# Patient Record
Sex: MALE | Race: WHITE | NOT HISPANIC OR LATINO | URBAN - METROPOLITAN AREA
[De-identification: names, ages, dates, MRNs, and addresses within clinical notes are randomized per-mention and may not be internally consistent; named-entity substitution may affect disease eponyms.]

---

## 2018-12-27 ENCOUNTER — TRANSCRIBE ORDERS (OUTPATIENT)
Dept: LAB | Age: 33
End: 2018-12-27

## 2018-12-27 ENCOUNTER — APPOINTMENT (OUTPATIENT)
Dept: LAB | Age: 33
End: 2018-12-27
Payer: COMMERCIAL

## 2018-12-27 DIAGNOSIS — N40.0 BENIGN PROSTATIC HYPERPLASIA, UNSPECIFIED WHETHER LOWER URINARY TRACT SYMPTOMS PRESENT: Primary | ICD-10-CM

## 2018-12-27 DIAGNOSIS — N40.0 BENIGN PROSTATIC HYPERPLASIA, UNSPECIFIED WHETHER LOWER URINARY TRACT SYMPTOMS PRESENT: ICD-10-CM

## 2018-12-27 LAB
BASOPHILS # BLD AUTO: 0.05 THOUSANDS/ΜL (ref 0–0.1)
BASOPHILS NFR BLD AUTO: 1 % (ref 0–1)
BUN SERPL-MCNC: 27 MG/DL (ref 5–25)
CREAT SERPL-MCNC: 1.52 MG/DL (ref 0.6–1.3)
EOSINOPHIL # BLD AUTO: 0.27 THOUSAND/ΜL (ref 0–0.61)
EOSINOPHIL NFR BLD AUTO: 4 % (ref 0–6)
ERYTHROCYTE [DISTWIDTH] IN BLOOD BY AUTOMATED COUNT: 11.9 % (ref 11.6–15.1)
GFR SERPL CREATININE-BSD FRML MDRD: 60 ML/MIN/1.73SQ M
HCT VFR BLD AUTO: 42.6 % (ref 36.5–49.3)
HGB BLD-MCNC: 14.5 G/DL (ref 12–17)
IMM GRANULOCYTES # BLD AUTO: 0.01 THOUSAND/UL (ref 0–0.2)
IMM GRANULOCYTES NFR BLD AUTO: 0 % (ref 0–2)
LYMPHOCYTES # BLD AUTO: 2.37 THOUSANDS/ΜL (ref 0.6–4.47)
LYMPHOCYTES NFR BLD AUTO: 32 % (ref 14–44)
MCH RBC QN AUTO: 31.9 PG (ref 26.8–34.3)
MCHC RBC AUTO-ENTMCNC: 34 G/DL (ref 31.4–37.4)
MCV RBC AUTO: 94 FL (ref 82–98)
MONOCYTES # BLD AUTO: 0.44 THOUSAND/ΜL (ref 0.17–1.22)
MONOCYTES NFR BLD AUTO: 6 % (ref 4–12)
NEUTROPHILS # BLD AUTO: 4.34 THOUSANDS/ΜL (ref 1.85–7.62)
NEUTS SEG NFR BLD AUTO: 57 % (ref 43–75)
NRBC BLD AUTO-RTO: 0 /100 WBCS
PLATELET # BLD AUTO: 374 THOUSANDS/UL (ref 149–390)
PMV BLD AUTO: 11.8 FL (ref 8.9–12.7)
RBC # BLD AUTO: 4.55 MILLION/UL (ref 3.88–5.62)
WBC # BLD AUTO: 7.48 THOUSAND/UL (ref 4.31–10.16)

## 2018-12-27 PROCEDURE — 84520 ASSAY OF UREA NITROGEN: CPT

## 2018-12-27 PROCEDURE — 82565 ASSAY OF CREATININE: CPT

## 2018-12-27 PROCEDURE — 85025 COMPLETE CBC W/AUTO DIFF WBC: CPT

## 2018-12-27 PROCEDURE — 36415 COLL VENOUS BLD VENIPUNCTURE: CPT

## 2018-12-31 ENCOUNTER — APPOINTMENT (OUTPATIENT)
Dept: LAB | Age: 33
End: 2018-12-31
Payer: COMMERCIAL

## 2018-12-31 DIAGNOSIS — N40.0 BENIGN PROSTATIC HYPERPLASIA, UNSPECIFIED WHETHER LOWER URINARY TRACT SYMPTOMS PRESENT: ICD-10-CM

## 2018-12-31 LAB
CREAT 24H UR-MRATE: 2 G/24HR (ref 0.8–1.8)
CREAT CL 24H UR+SERPL-VRATE: 90.85 ML/MIN (ref 80–125)
CREAT SERPL-MCNC: 1.5 MG/DL (ref 0.6–1.3)
CREAT UR-MCNC: 79.4 MG/DL
PROT 24H UR-MCNC: 4075 MG/24 HRS (ref 40–150)
SPECIMEN VOL UR: 2500 ML
SPECIMEN VOL UR: 2500 ML

## 2018-12-31 PROCEDURE — 36415 COLL VENOUS BLD VENIPUNCTURE: CPT

## 2018-12-31 PROCEDURE — 82575 CREATININE CLEARANCE TEST: CPT

## 2018-12-31 PROCEDURE — 84156 ASSAY OF PROTEIN URINE: CPT

## 2018-12-31 PROCEDURE — 82565 ASSAY OF CREATININE: CPT

## 2019-01-04 ENCOUNTER — TRANSCRIBE ORDERS (OUTPATIENT)
Dept: ADMINISTRATIVE | Facility: HOSPITAL | Age: 34
End: 2019-01-04

## 2019-01-04 DIAGNOSIS — R31.9 HEMATURIA SYNDROME: ICD-10-CM

## 2019-01-04 DIAGNOSIS — N40.1 BENIGN PROSTATIC HYPERPLASIA WITH LOWER URINARY TRACT SYMPTOMS, SYMPTOM DETAILS UNSPECIFIED: Primary | ICD-10-CM

## 2019-01-07 ENCOUNTER — HOSPITAL ENCOUNTER (OUTPATIENT)
Dept: RADIOLOGY | Age: 34
Discharge: HOME | End: 2019-01-07
Payer: COMMERCIAL

## 2019-01-07 DIAGNOSIS — N40.0 ENLARGED PROSTATE: ICD-10-CM

## 2019-01-07 DIAGNOSIS — R31.9 HEMATURIA SYNDROME: ICD-10-CM

## 2019-01-07 DIAGNOSIS — N40.1 BENIGN PROSTATIC HYPERPLASIA WITH LOWER URINARY TRACT SYMPTOMS, SYMPTOM DETAILS UNSPECIFIED: ICD-10-CM

## 2019-01-07 PROCEDURE — 51798 US URINE CAPACITY MEASURE: CPT

## 2019-02-06 ENCOUNTER — TELEPHONE (OUTPATIENT)
Dept: NEPHROLOGY | Facility: CLINIC | Age: 34
End: 2019-02-06

## 2019-02-06 NOTE — TELEPHONE ENCOUNTER
Called and left a voicemail in regards to rescheduling patient  He is scheduled for a consultation apt with Dr Adam Martin  Patient needs a new provider and day/ time   I have 2 slots saved from 1-3 pm on 2/25 with Dr Tawanda Huber

## 2019-02-13 ENCOUNTER — TELEPHONE (OUTPATIENT)
Dept: NEPHROLOGY | Facility: CLINIC | Age: 34
End: 2019-02-13

## 2019-02-14 ENCOUNTER — HOSPITAL ENCOUNTER (INPATIENT)
Facility: HOSPITAL | Age: 34
LOS: 2 days | Discharge: HOME | DRG: 700 | End: 2019-02-16
Attending: INTERNAL MEDICINE | Admitting: INTERNAL MEDICINE
Payer: COMMERCIAL

## 2019-02-14 ENCOUNTER — CONSULT (OUTPATIENT)
Dept: NEPHROLOGY | Facility: CLINIC | Age: 34
End: 2019-02-14
Payer: COMMERCIAL

## 2019-02-14 VITALS
WEIGHT: 158.4 LBS | SYSTOLIC BLOOD PRESSURE: 142 MMHG | DIASTOLIC BLOOD PRESSURE: 69 MMHG | HEIGHT: 71 IN | BODY MASS INDEX: 22.18 KG/M2 | HEART RATE: 86 BPM

## 2019-02-14 DIAGNOSIS — N01.9 RAPIDLY PROGRESSIVE GLOMERULONEPHRITIS: ICD-10-CM

## 2019-02-14 DIAGNOSIS — R31.9 HEMATURIA: Primary | ICD-10-CM

## 2019-02-14 DIAGNOSIS — N05.9 NEPHRITIC SYNDROME: ICD-10-CM

## 2019-02-14 DIAGNOSIS — R03.0 ELEVATED BLOOD PRESSURE READING: ICD-10-CM

## 2019-02-14 DIAGNOSIS — R31.9 HEMATURIA, UNSPECIFIED TYPE: ICD-10-CM

## 2019-02-14 DIAGNOSIS — R80.9 PROTEINURIA: ICD-10-CM

## 2019-02-14 DIAGNOSIS — R80.9 PROTEINURIA, UNSPECIFIED TYPE: Primary | ICD-10-CM

## 2019-02-14 LAB
ANION GAP SERPL CALCULATED.3IONS-SCNC: 6 MMOL/L (ref 4–13)
BACTERIA UR QL AUTO: ABNORMAL /HPF
BASOPHILS # BLD AUTO: 0.07 THOUSANDS/ΜL (ref 0–0.1)
BASOPHILS NFR BLD AUTO: 1 % (ref 0–1)
BILIRUB UR QL STRIP: NEGATIVE
BUN SERPL-MCNC: 29 MG/DL (ref 5–25)
C3 SERPL-MCNC: 96.2 MG/DL (ref 90–180)
C4 SERPL-MCNC: 21 MG/DL (ref 10–40)
CALCIUM SERPL-MCNC: 8.4 MG/DL (ref 8.3–10.1)
CHLORIDE SERPL-SCNC: 109 MMOL/L (ref 100–108)
CLARITY UR: CLEAR
CO2 SERPL-SCNC: 28 MMOL/L (ref 21–32)
COLOR UR: YELLOW
CREAT SERPL-MCNC: 1.43 MG/DL (ref 0.6–1.3)
EOSINOPHIL # BLD AUTO: 0.24 THOUSAND/ΜL (ref 0–0.61)
EOSINOPHIL NFR BLD AUTO: 3 % (ref 0–6)
ERYTHROCYTE [DISTWIDTH] IN BLOOD BY AUTOMATED COUNT: 11.9 % (ref 11.6–15.1)
GFR SERPL CREATININE-BSD FRML MDRD: 65 ML/MIN/1.73SQ M
GLUCOSE SERPL-MCNC: 77 MG/DL (ref 65–140)
GLUCOSE UR STRIP-MCNC: NEGATIVE MG/DL
HCT VFR BLD AUTO: 36.3 % (ref 36.5–49.3)
HGB BLD-MCNC: 12.5 G/DL (ref 12–17)
HGB UR QL STRIP.AUTO: ABNORMAL
HIV 1+2 AB+HIV1 P24 AG SERPL QL IA: NORMAL
HIV1 P24 AG SER QL: NORMAL
HYALINE CASTS #/AREA URNS LPF: ABNORMAL /LPF
IMM GRANULOCYTES # BLD AUTO: 0.02 THOUSAND/UL (ref 0–0.2)
IMM GRANULOCYTES NFR BLD AUTO: 0 % (ref 0–2)
KETONES UR STRIP-MCNC: NEGATIVE MG/DL
LEUKOCYTE ESTERASE UR QL STRIP: NEGATIVE
LYMPHOCYTES # BLD AUTO: 3.26 THOUSANDS/ΜL (ref 0.6–4.47)
LYMPHOCYTES NFR BLD AUTO: 34 % (ref 14–44)
MCH RBC QN AUTO: 32.1 PG (ref 26.8–34.3)
MCHC RBC AUTO-ENTMCNC: 34.4 G/DL (ref 31.4–37.4)
MCV RBC AUTO: 93 FL (ref 82–98)
MONOCYTES # BLD AUTO: 0.45 THOUSAND/ΜL (ref 0.17–1.22)
MONOCYTES NFR BLD AUTO: 5 % (ref 4–12)
NEUTROPHILS # BLD AUTO: 5.46 THOUSANDS/ΜL (ref 1.85–7.62)
NEUTS SEG NFR BLD AUTO: 57 % (ref 43–75)
NITRITE UR QL STRIP: NEGATIVE
NON-SQ EPI CELLS URNS QL MICRO: ABNORMAL /HPF
NRBC BLD AUTO-RTO: 0 /100 WBCS
PH UR STRIP.AUTO: 5.5 [PH] (ref 4.5–8)
PLATELET # BLD AUTO: 343 THOUSANDS/UL (ref 149–390)
PMV BLD AUTO: 10.8 FL (ref 8.9–12.7)
POTASSIUM SERPL-SCNC: 3.6 MMOL/L (ref 3.5–5.3)
PROT UR STRIP-MCNC: ABNORMAL MG/DL
RBC # BLD AUTO: 3.9 MILLION/UL (ref 3.88–5.62)
RBC #/AREA URNS AUTO: ABNORMAL /HPF
SL AMB  POCT GLUCOSE, UA: NEGATIVE
SL AMB LEUKOCYTE ESTERASE,UA: NEGATIVE
SL AMB POCT BILIRUBIN,UA: NEGATIVE
SL AMB POCT BLOOD,UA: 3
SL AMB POCT CLARITY,UA: CLEAR
SL AMB POCT COLOR,UA: YELLOW
SL AMB POCT KETONES,UA: NEGATIVE
SL AMB POCT NITRITE,UA: NEGATIVE
SL AMB POCT PH,UA: 6
SL AMB POCT SPECIFIC GRAVITY,UA: 1.02
SL AMB POCT URINE PROTEIN: 4
SL AMB POCT UROBILINOGEN: 0.2
SODIUM SERPL-SCNC: 143 MMOL/L (ref 136–145)
SP GR UR STRIP.AUTO: 1.01 (ref 1–1.03)
UROBILINOGEN UR QL STRIP.AUTO: 0.2 E.U./DL
WBC # BLD AUTO: 9.5 THOUSAND/UL (ref 4.31–10.16)
WBC #/AREA URNS AUTO: ABNORMAL /HPF

## 2019-02-14 PROCEDURE — 85025 COMPLETE CBC W/AUTO DIFF WBC: CPT | Performed by: INTERNAL MEDICINE

## 2019-02-14 PROCEDURE — 99245 OFF/OP CONSLTJ NEW/EST HI 55: CPT | Performed by: INTERNAL MEDICINE

## 2019-02-14 PROCEDURE — 93005 ELECTROCARDIOGRAM TRACING: CPT

## 2019-02-14 PROCEDURE — 80074 ACUTE HEPATITIS PANEL: CPT | Performed by: INTERNAL MEDICINE

## 2019-02-14 PROCEDURE — 86038 ANTINUCLEAR ANTIBODIES: CPT | Performed by: INTERNAL MEDICINE

## 2019-02-14 PROCEDURE — 87806 HIV AG W/HIV1&2 ANTB W/OPTIC: CPT | Performed by: INTERNAL MEDICINE

## 2019-02-14 PROCEDURE — 84166 PROTEIN E-PHORESIS/URINE/CSF: CPT | Performed by: PATHOLOGY

## 2019-02-14 PROCEDURE — 81002 URINALYSIS NONAUTO W/O SCOPE: CPT | Performed by: INTERNAL MEDICINE

## 2019-02-14 PROCEDURE — 86160 COMPLEMENT ANTIGEN: CPT | Performed by: INTERNAL MEDICINE

## 2019-02-14 PROCEDURE — 82570 ASSAY OF URINE CREATININE: CPT | Performed by: INTERNAL MEDICINE

## 2019-02-14 PROCEDURE — 84156 ASSAY OF PROTEIN URINE: CPT | Performed by: INTERNAL MEDICINE

## 2019-02-14 PROCEDURE — 84165 PROTEIN E-PHORESIS SERUM: CPT | Performed by: INTERNAL MEDICINE

## 2019-02-14 PROCEDURE — 86225 DNA ANTIBODY NATIVE: CPT | Performed by: INTERNAL MEDICINE

## 2019-02-14 PROCEDURE — 99223 1ST HOSP IP/OBS HIGH 75: CPT | Performed by: INTERNAL MEDICINE

## 2019-02-14 PROCEDURE — 84165 PROTEIN E-PHORESIS SERUM: CPT | Performed by: PATHOLOGY

## 2019-02-14 PROCEDURE — 80048 BASIC METABOLIC PNL TOTAL CA: CPT | Performed by: INTERNAL MEDICINE

## 2019-02-14 PROCEDURE — 82595 ASSAY OF CRYOGLOBULIN: CPT | Performed by: INTERNAL MEDICINE

## 2019-02-14 PROCEDURE — 84166 PROTEIN E-PHORESIS/URINE/CSF: CPT | Performed by: INTERNAL MEDICINE

## 2019-02-14 PROCEDURE — 81001 URINALYSIS AUTO W/SCOPE: CPT | Performed by: INTERNAL MEDICINE

## 2019-02-14 RX ORDER — SENNOSIDES 8.6 MG
1 TABLET ORAL DAILY
Status: DISCONTINUED | OUTPATIENT
Start: 2019-02-15 | End: 2019-02-16 | Stop reason: HOSPADM

## 2019-02-14 RX ORDER — ONDANSETRON 2 MG/ML
4 INJECTION INTRAMUSCULAR; INTRAVENOUS EVERY 6 HOURS PRN
Status: DISCONTINUED | OUTPATIENT
Start: 2019-02-14 | End: 2019-02-16 | Stop reason: HOSPADM

## 2019-02-14 RX ORDER — DOCUSATE SODIUM 100 MG/1
100 CAPSULE, LIQUID FILLED ORAL 2 TIMES DAILY
Status: DISCONTINUED | OUTPATIENT
Start: 2019-02-14 | End: 2019-02-16 | Stop reason: HOSPADM

## 2019-02-14 RX ORDER — ACETAMINOPHEN 325 MG/1
650 TABLET ORAL EVERY 6 HOURS PRN
Status: DISCONTINUED | OUTPATIENT
Start: 2019-02-14 | End: 2019-02-16 | Stop reason: HOSPADM

## 2019-02-14 RX ORDER — MAGNESIUM HYDROXIDE/ALUMINUM HYDROXICE/SIMETHICONE 120; 1200; 1200 MG/30ML; MG/30ML; MG/30ML
30 SUSPENSION ORAL EVERY 6 HOURS PRN
Status: DISCONTINUED | OUTPATIENT
Start: 2019-02-14 | End: 2019-02-16 | Stop reason: HOSPADM

## 2019-02-14 RX ADMIN — DOCUSATE SODIUM 100 MG: 100 CAPSULE, LIQUID FILLED ORAL at 20:50

## 2019-02-14 NOTE — PROGRESS NOTES
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  -patient's overall clinical picture with a history of microscopic hematuria, a serum creatinine from December 31st of 1 5 with an elevated BUN, a 24 hr urine protein of 4 g with a urine volume of 2 5 L elevated blood pressure reading today in the office as well as RBC casts on urine sediment are concerning for a rapidly progressive glomerulonephritis  -given that his blood work is from 6 weeks ago I would recommend inpatient hospitalization-have called for a direct admission  -would check complements, Anca, anti-GBM, double-stranded DNA, LES, SPEP, UPEP, anti PLA2R if available urine protein to creatinine ratio, urinalysis, hepatitis, HIV, ASLO, cryoglobulin  -consider kidney biopsy either tomorrow or Monday depending on availability from [facility]  -depending on lab work would start 500 to a 1000 mg of methylprednisolone daily x3 days  -kidney size from renal ultrasound on January 7th shows a right kidney of 10 2 x 3 9 cm and a left kidney of 11 5 x 5 0 cm with normal echogenicity and contour  -differential diagnosis includes an immune complex crescentic GN-IgA crescentic GN IgA vasculitis, lupus nephritis, membranous nephropathy or a mixed cryoglobulinemia, Pauci immune RPGN, anti-GBM, versus other    4   Elevated blood pressure reading  -blood pressure elevated in the office today  -consider starting RAAS blockade after blood work is reviewed if remains elevated    Highly suspicious for a rapidly progressive glomerulonephritis, his sister does have a history of an RP GN he also has an uncle with a history of kidney disease although he is not sure what type  Nevertheless if this is an RP GN progression to end-stage kidney disease could happen within weeks to months  I did stress the importance of rapid diagnosis treatment and treatment to preserve residual renal function and prevent further morbidity  He has agreed to the plan of going to the hospital   Awaiting for bed availability at [facility] and will make further recommendations during his hospitalization  Discussed with my colleague and waiting a call back from [address] [place] [address] for admission      HPI:  Xena Roman is a 34 y o male who was referred by Marlin Quan MD for evaluation of hematuria and proteinuria    A 46-year-old male he has family history of kidney disease his grandfather require dialysis during his senior years, his uncle is on the transplant list, his sister has reflux nephropathy, is no other significant past medical history he is retired from [employer] in his 25s 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 did not have any lead exposure Mercury exposure or cadmium exposure that he knows of  He now as a , he has had hematuria as a child and was following up with Urology at that point for cystoscopy he had noticed some dark urine increased nausea in the mornings as well as an ammonia taste in his breath and decided to have a repeat analysis by his urologist at which point he had a cystoscopy done that was negative, a renal ultrasound that shows normal size and echogenicity but a 24 hr urine protein that was elevated to 4 g and a hematuria  He is here with his fiancee as well, denies any illicit drug use does use some marijuana denies any excessive NSAID use does have some foamy urine no gross hematuria but notices his urine is dark at times  Otherwise is in his usual state of health  Does denies swelling      I personally spent over half of a total 60 minutes face to face with the patient in counseling and discussion and/or coordination of care as described above  ROS: All the systems were reviewed and were negative except as documented on the HPI  Allergies: Patient has no known allergies  Medications: No current outpatient medications on file  Past Medical History:   Diagnosis Date    Hematuria     Proteinuria      Past Surgical History:   Procedure Laterality Date    CLAVICLE SURGERY       Family History   Problem Relation Age of Onset    Breast cancer Mother     No Known Problems Father     Kidney disease Maternal Uncle     Kidney disease Maternal Grandfather       reports that he has never smoked  He has never used smokeless tobacco  He reports that he drinks alcohol  He reports that he does not use drugs  Physical Exam:   Vitals:    02/14/19 1039   BP: 142/69   BP Location: Right arm   Patient Position: Sitting   Cuff Size: Large   Pulse: 86   Weight: 71 8 kg (158 lb 6 4 oz)   Height: 5' 11" (1 803 m)     Body mass index is 22 09 kg/m²  General: conscious, cooperative, in not acute distress  Eyes: conjunctivae pink, anicteric sclerae  ENT: lips and mucous membranes moist  Neck: supple, no JVD  Chest: clear breath sounds bilateral, no crackles, ronchus or wheezings  CVS: distinct S1 & S2, normal rate, regular rhythm  Abdomen: soft, non-tender, non-distended, normoactive bowel sounds  Extremities: no edema of both legs  Skin: no rash  Neuro: awake, alert, oriented      Results for orders placed or performed in visit on 02/14/19   POCT urine dip   Result Value Ref Range    LEUKOCYTE ESTERASE,UA NEGATIVE     NITRITE,UA NEGATIVE     SL AMB POCT UROBILINOGEN 0 2     POCT URINE PROTEIN 4      PH,UA 6 0     BLOOD,UA 3     SPECIFIC GRAVITY,UA 1 020     KETONES,UA NEGATIVE     BILIRUBIN,UA NEGATIVE     GLUCOSE, UA NEGATIVE      COLOR,UA YELLOW     CLARITY,UA CLEAR          Portions of the record may have been created with voice recognition software  Occasional wrong word or "sound a like" substitutions may have occurred due to the inherent limitations of voice recognition software  Read the chart carefully and recognize, using context, where substitutions have occurred  If you have any questions, please contact the dictating provider

## 2019-02-14 NOTE — PATIENT INSTRUCTIONS
Mark Pichardo, at this point we recommend inpatient hospitalization for further evaluation of the protein in your urine an elevated kidney numbers    Further recommendations and treatment will be provided at that point

## 2019-02-14 NOTE — LETTER
February 14, 2019     Ector Chauhan MD  [address] [place]     Patient: Jordana Code   YOB: 1985   Date of Visit: 2/14/2019       Dear Dr INGRAM [facility]: Thank you for referring Asmita Adam to me for evaluation  Below are my notes for this consultation  If you have questions, please do not hesitate to call me  I look forward to following your patient along with you  Sincerely,        Heide Diez DO        CC: No Recipients  Heide Diez DO  2/14/2019 12:15 PM  Incomplete  OFFICE CONSULT - Nephrology   Jordana Code 34 y o  male MRN: [id number]    Encounter: [id number]        ASSESSMENT & PLAN    34Year old male with a a remote history of microscopic hematuria presents for evaluation of worsening hematuria, acute kidney injury, proteinuria    Leonidas Smith was seen today for consult  Diagnoses and all orders for this visit:    1   Proteinuria, unspecified type and Hematuria, unspecified type with 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  -patient's overall clinical picture with a history of microscopic hematuria, a serum creatinine from December 31st of 1 5 with an elevated BUN, a 24 hr urine protein of 4 g with a urine volume of 2 5 L elevated blood pressure reading today in the office as well as RBC casts on urine sediment are concerning for a rapidly progressive glomerulonephritis  -given that his blood work is from 6 weeks ago I would recommend inpatient hospitalization-have called for a direct admission  -would check complements, Anca, anti-GBM, double-stranded DNA, LES, SPEP, UPEP, anti PLA2R if available urine protein to creatinine ratio, urinalysis, hepatitis, HIV, ASLO, cryoglobulin  -consider kidney biopsy either tomorrow or Monday depending on availability from [facility]  -depending on lab work would start 500 to a 1000 mg of methylprednisolone daily x3 days  -kidney size from renal ultrasound on January 7th shows a right kidney of 10 2 x 3 9 cm and a left kidney of 11 5 x 5 0 cm with normal echogenicity and contour  -differential diagnosis includes an immune complex crescentic GN-IgA crescentic GN IgA vasculitis, lupus nephritis, membranous nephropathy or a mixed cryoglobulinemia, Pauci immune RPGN, anti-GBM, versus other    4  Elevated blood pressure reading  -blood pressure elevated in the office today  -consider starting RAAS blockade after blood work is reviewed if remains elevated    Highly suspicious for a rapidly progressive glomerulonephritis, his sister does have a history of an RP GN he also has an uncle with a history of kidney disease although he is not sure what type  Nevertheless if this is an RP GN progression to end-stage kidney disease could happen within weeks to months  I did stress the importance of rapid diagnosis treatment and treatment to preserve residual renal function and prevent further morbidity  He has agreed to the plan of going to the hospital   Awaiting for bed availability at [facility] and will make further recommendations during his hospitalization  Discussed with my colleague and waiting a call back from [address] [place] [address] for admission      HPI:  Laretta Eisenmenger is a 34 y o male who was referred by Laura Aranda MD for evaluation of hematuria and proteinuria    A 59-year-old male he has family history of kidney disease his grandfather require dialysis during his senior years, his uncle is on the transplant list, his sister has reflux nephropathy, is no other significant past medical history he is retired from [employer] in his 25s 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 did not have any lead exposure Mercury exposure or cadmium exposure that he knows of    He now as a , he has had hematuria as a child and was following up with Urology at that point for cystoscopy he had noticed some dark urine increased nausea in the mornings as well as an ammonia taste in his breath and decided to have a repeat analysis by his urologist at which point he had a cystoscopy done that was negative, a renal ultrasound that shows normal size and echogenicity but a 24 hr urine protein that was elevated to 4 g and a hematuria  He is here with his fiancee as well, denies any illicit drug use does use some marijuana denies any excessive NSAID use does have some foamy urine no gross hematuria but notices his urine is dark at times  Otherwise is in his usual state of health  Does denies swelling  I personally spent over half of a total 60 minutes face to face with the patient in counseling and discussion and/or coordination of care as described above  ROS: All the systems were reviewed and were negative except as documented on the HPI  Allergies: Patient has no known allergies  Medications: No current outpatient medications on file  Past Medical History:   Diagnosis Date    Hematuria     Proteinuria      Past Surgical History:   Procedure Laterality Date    CLAVICLE SURGERY       Family History   Problem Relation Age of Onset    Breast cancer Mother     No Known Problems Father     Kidney disease Maternal Uncle     Kidney disease Maternal Grandfather       reports that he has never smoked  He has never used smokeless tobacco  He reports that he drinks alcohol  He reports that he does not use drugs  Physical Exam:   Vitals:    02/14/19 1039   BP: 142/69   BP Location: Right arm   Patient Position: Sitting   Cuff Size: Large   Pulse: 86   Weight: 71 8 kg (158 lb 6 4 oz)   Height: 5' 11" (1 803 m)     Body mass index is 22 09 kg/m²      General: conscious, cooperative, in not acute distress  Eyes: conjunctivae pink, anicteric sclerae  ENT: lips and mucous membranes moist  Neck: supple, no JVD  Chest: clear breath sounds bilateral, no crackles, ronchus or wheezings  CVS: distinct S1 & S2, normal rate, regular rhythm  Abdomen: soft, non-tender, non-distended, normoactive bowel sounds  Extremities: no edema of both legs  Skin: no rash  Neuro: awake, alert, oriented      Results for orders placed or performed in visit on 02/14/19   POCT urine dip   Result Value Ref Range    LEUKOCYTE ESTERASE,UA NEGATIVE     NITRITE,UA NEGATIVE     SL AMB POCT UROBILINOGEN 0 2     POCT URINE PROTEIN 4      PH,UA 6 0     BLOOD,UA 3     SPECIFIC GRAVITY,UA 1 020     KETONES,UA NEGATIVE     BILIRUBIN,UA NEGATIVE     GLUCOSE, UA NEGATIVE      COLOR,UA YELLOW     CLARITY,UA CLEAR          Portions of the record may have been created with voice recognition software  Occasional wrong word or "sound a like" substitutions may have occurred due to the inherent limitations of voice recognition software  Read the chart carefully and recognize, using context, where substitutions have occurred  If you have any questions, please contact the dictating provider

## 2019-02-15 ENCOUNTER — APPOINTMENT (INPATIENT)
Dept: RADIOLOGY | Facility: HOSPITAL | Age: 34
DRG: 700 | End: 2019-02-15
Attending: INTERNAL MEDICINE
Payer: COMMERCIAL

## 2019-02-15 LAB
ALBUMIN SERPL BCP-MCNC: 2.9 G/DL (ref 3.5–5)
ALBUMIN SERPL ELPH-MCNC: 3.12 G/DL (ref 3.5–5)
ALBUMIN SERPL ELPH-MCNC: 57.8 % (ref 52–65)
ALBUMIN UR ELPH-MCNC: 88.9 %
ALP SERPL-CCNC: 55 U/L (ref 46–116)
ALPHA1 GLOB MFR UR ELPH: 2.2 %
ALPHA1 GLOB SERPL ELPH-MCNC: 0.26 G/DL (ref 0.1–0.4)
ALPHA1 GLOB SERPL ELPH-MCNC: 4.8 % (ref 2.5–5)
ALPHA2 GLOB MFR UR ELPH: 1.9 %
ALPHA2 GLOB SERPL ELPH-MCNC: 0.78 G/DL (ref 0.4–1.2)
ALPHA2 GLOB SERPL ELPH-MCNC: 14.4 % (ref 7–13)
ALT SERPL W P-5'-P-CCNC: 24 U/L (ref 12–78)
ANION GAP SERPL CALCULATED.3IONS-SCNC: 8 MMOL/L (ref 4–13)
ASO AB TITR SER LA: NORMAL {TITER}
AST SERPL W P-5'-P-CCNC: 19 U/L (ref 5–45)
ATRIAL RATE: 63 BPM
B-GLOBULIN MFR UR ELPH: 3.8 %
BASOPHILS # BLD AUTO: 0.08 THOUSANDS/ΜL (ref 0–0.1)
BASOPHILS NFR BLD AUTO: 1 % (ref 0–1)
BETA GLOB ABNORMAL SERPL ELPH-MCNC: 0.36 G/DL (ref 0.4–0.8)
BETA1 GLOB SERPL ELPH-MCNC: 6.7 % (ref 5–13)
BETA2 GLOB SERPL ELPH-MCNC: 5.8 % (ref 2–8)
BETA2+GAMMA GLOB SERPL ELPH-MCNC: 0.31 G/DL (ref 0.2–0.5)
BILIRUB SERPL-MCNC: 1.42 MG/DL (ref 0.2–1)
BUN SERPL-MCNC: 27 MG/DL (ref 5–25)
CALCIUM SERPL-MCNC: 8.8 MG/DL (ref 8.3–10.1)
CHLORIDE SERPL-SCNC: 109 MMOL/L (ref 100–108)
CO2 SERPL-SCNC: 25 MMOL/L (ref 21–32)
CREAT SERPL-MCNC: 1.44 MG/DL (ref 0.6–1.3)
CREAT UR-MCNC: 64.9 MG/DL
EOSINOPHIL # BLD AUTO: 0.42 THOUSAND/ΜL (ref 0–0.61)
EOSINOPHIL NFR BLD AUTO: 5 % (ref 0–6)
ERYTHROCYTE [DISTWIDTH] IN BLOOD BY AUTOMATED COUNT: 11.9 % (ref 11.6–15.1)
GAMMA GLOB ABNORMAL SERPL ELPH-MCNC: 0.57 G/DL (ref 0.5–1.6)
GAMMA GLOB MFR UR ELPH: 3.2 %
GAMMA GLOB SERPL ELPH-MCNC: 10.5 % (ref 12–22)
GFR SERPL CREATININE-BSD FRML MDRD: 64 ML/MIN/1.73SQ M
GLUCOSE SERPL-MCNC: 84 MG/DL (ref 65–140)
HAV IGM SER QL: NORMAL
HBV CORE IGM SER QL: NORMAL
HBV SURFACE AG SER QL: NORMAL
HCT VFR BLD AUTO: 41 % (ref 36.5–49.3)
HCV AB SER QL: NORMAL
HGB BLD-MCNC: 14.2 G/DL (ref 12–17)
IGG/ALB SER: 1.37 {RATIO} (ref 1.1–1.8)
IMM GRANULOCYTES # BLD AUTO: 0.01 THOUSAND/UL (ref 0–0.2)
IMM GRANULOCYTES NFR BLD AUTO: 0 % (ref 0–2)
INR PPP: 0.94 (ref 0.86–1.17)
LYMPHOCYTES # BLD AUTO: 3.36 THOUSANDS/ΜL (ref 0.6–4.47)
LYMPHOCYTES NFR BLD AUTO: 38 % (ref 14–44)
MAGNESIUM SERPL-MCNC: 2.3 MG/DL (ref 1.6–2.6)
MCH RBC QN AUTO: 32.6 PG (ref 26.8–34.3)
MCHC RBC AUTO-ENTMCNC: 34.6 G/DL (ref 31.4–37.4)
MCV RBC AUTO: 94 FL (ref 82–98)
MONOCYTES # BLD AUTO: 0.52 THOUSAND/ΜL (ref 0.17–1.22)
MONOCYTES NFR BLD AUTO: 6 % (ref 4–12)
NEUTROPHILS # BLD AUTO: 4.58 THOUSANDS/ΜL (ref 1.85–7.62)
NEUTS SEG NFR BLD AUTO: 50 % (ref 43–75)
NRBC BLD AUTO-RTO: 0 /100 WBCS
P AXIS: 57 DEGREES
PHOSPHATE SERPL-MCNC: 3.5 MG/DL (ref 2.7–4.5)
PLATELET # BLD AUTO: 353 THOUSANDS/UL (ref 149–390)
PMV BLD AUTO: 11.7 FL (ref 8.9–12.7)
POTASSIUM SERPL-SCNC: 4.2 MMOL/L (ref 3.5–5.3)
PR INTERVAL: 148 MS
PROT PATTERN SERPL ELPH-IMP: ABNORMAL
PROT PATTERN UR ELPH-IMP: ABNORMAL
PROT SERPL-MCNC: 5.4 G/DL (ref 6.4–8.2)
PROT SERPL-MCNC: 5.8 G/DL (ref 6.4–8.2)
PROT UR-MCNC: 188 MG/DL
PROT UR-MCNC: 188 MG/DL
PROT/CREAT UR: 2.9 MG/G{CREAT} (ref 0–0.1)
PROTHROMBIN TIME: 12.7 SECONDS (ref 11.8–14.2)
QRS AXIS: 47 DEGREES
QRSD INTERVAL: 96 MS
QT INTERVAL: 382 MS
QTC INTERVAL: 390 MS
RBC # BLD AUTO: 4.35 MILLION/UL (ref 3.88–5.62)
RYE IGE QN: NEGATIVE
SODIUM SERPL-SCNC: 142 MMOL/L (ref 136–145)
T WAVE AXIS: 39 DEGREES
VENTRICULAR RATE: 63 BPM
WBC # BLD AUTO: 8.97 THOUSAND/UL (ref 4.31–10.16)

## 2019-02-15 PROCEDURE — 99152 MOD SED SAME PHYS/QHP 5/>YRS: CPT | Performed by: RADIOLOGY

## 2019-02-15 PROCEDURE — 50200 RENAL BIOPSY PERQ: CPT | Performed by: RADIOLOGY

## 2019-02-15 PROCEDURE — 99254 IP/OBS CNSLTJ NEW/EST MOD 60: CPT | Performed by: INTERNAL MEDICINE

## 2019-02-15 PROCEDURE — 85025 COMPLETE CBC W/AUTO DIFF WBC: CPT | Performed by: INTERNAL MEDICINE

## 2019-02-15 PROCEDURE — 88300 SURGICAL PATH GROSS: CPT | Performed by: PATHOLOGY

## 2019-02-15 PROCEDURE — 93010 ELECTROCARDIOGRAM REPORT: CPT | Performed by: INTERNAL MEDICINE

## 2019-02-15 PROCEDURE — 99153 MOD SED SAME PHYS/QHP EA: CPT

## 2019-02-15 PROCEDURE — 0TB13ZX EXCISION OF LEFT KIDNEY, PERCUTANEOUS APPROACH, DIAGNOSTIC: ICD-10-PCS | Performed by: RADIOLOGY

## 2019-02-15 PROCEDURE — 88350 IMFLUOR EA ADDL 1ANTB STN PX: CPT | Performed by: INTERNAL MEDICINE

## 2019-02-15 PROCEDURE — 84100 ASSAY OF PHOSPHORUS: CPT | Performed by: INTERNAL MEDICINE

## 2019-02-15 PROCEDURE — 88313 SPECIAL STAINS GROUP 2: CPT | Performed by: INTERNAL MEDICINE

## 2019-02-15 PROCEDURE — 86063 ANTISTREPTOLYSIN O SCREEN: CPT | Performed by: PHYSICIAN ASSISTANT

## 2019-02-15 PROCEDURE — 8E0WXBG COMPUTER ASSISTED PROCEDURE OF TRUNK REGION, WITH COMPUTERIZED TOMOGRAPHY: ICD-10-PCS | Performed by: RADIOLOGY

## 2019-02-15 PROCEDURE — 99152 MOD SED SAME PHYS/QHP 5/>YRS: CPT

## 2019-02-15 PROCEDURE — 77012 CT SCAN FOR NEEDLE BIOPSY: CPT | Performed by: RADIOLOGY

## 2019-02-15 PROCEDURE — 88348 ELECTRON MICROSCOPY DX: CPT | Performed by: INTERNAL MEDICINE

## 2019-02-15 PROCEDURE — 83520 IMMUNOASSAY QUANT NOS NONAB: CPT | Performed by: PHYSICIAN ASSISTANT

## 2019-02-15 PROCEDURE — 85610 PROTHROMBIN TIME: CPT | Performed by: INTERNAL MEDICINE

## 2019-02-15 PROCEDURE — 88305 TISSUE EXAM BY PATHOLOGIST: CPT | Performed by: INTERNAL MEDICINE

## 2019-02-15 PROCEDURE — 83735 ASSAY OF MAGNESIUM: CPT | Performed by: INTERNAL MEDICINE

## 2019-02-15 PROCEDURE — 86255 FLUORESCENT ANTIBODY SCREEN: CPT | Performed by: PHYSICIAN ASSISTANT

## 2019-02-15 PROCEDURE — 77012 CT SCAN FOR NEEDLE BIOPSY: CPT

## 2019-02-15 PROCEDURE — 88346 IMFLUOR 1ST 1ANTB STAIN PX: CPT | Performed by: INTERNAL MEDICINE

## 2019-02-15 PROCEDURE — 99232 SBSQ HOSP IP/OBS MODERATE 35: CPT | Performed by: NURSE PRACTITIONER

## 2019-02-15 PROCEDURE — 80053 COMPREHEN METABOLIC PANEL: CPT | Performed by: INTERNAL MEDICINE

## 2019-02-15 PROCEDURE — 50200 RENAL BIOPSY PERQ: CPT

## 2019-02-15 RX ORDER — MIDAZOLAM HYDROCHLORIDE 1 MG/ML
INJECTION INTRAMUSCULAR; INTRAVENOUS CODE/TRAUMA/SEDATION MEDICATION
Status: COMPLETED | OUTPATIENT
Start: 2019-02-15 | End: 2019-02-15

## 2019-02-15 RX ORDER — FENTANYL CITRATE 50 UG/ML
INJECTION, SOLUTION INTRAMUSCULAR; INTRAVENOUS CODE/TRAUMA/SEDATION MEDICATION
Status: COMPLETED | OUTPATIENT
Start: 2019-02-15 | End: 2019-02-15

## 2019-02-15 RX ADMIN — MIDAZOLAM 1 MG: 1 INJECTION INTRAMUSCULAR; INTRAVENOUS at 10:48

## 2019-02-15 RX ADMIN — FENTANYL CITRATE 25 MCG: 50 INJECTION INTRAMUSCULAR; INTRAVENOUS at 10:38

## 2019-02-15 RX ADMIN — MIDAZOLAM 1 MG: 1 INJECTION INTRAMUSCULAR; INTRAVENOUS at 10:28

## 2019-02-15 RX ADMIN — FENTANYL CITRATE 50 MCG: 50 INJECTION INTRAMUSCULAR; INTRAVENOUS at 10:29

## 2019-02-15 RX ADMIN — DOCUSATE SODIUM 100 MG: 100 CAPSULE, LIQUID FILLED ORAL at 17:12

## 2019-02-15 RX ADMIN — FENTANYL CITRATE 25 MCG: 50 INJECTION INTRAMUSCULAR; INTRAVENOUS at 10:34

## 2019-02-15 RX ADMIN — FENTANYL CITRATE 50 MCG: 50 INJECTION INTRAMUSCULAR; INTRAVENOUS at 10:48

## 2019-02-15 RX ADMIN — MIDAZOLAM 1 MG: 1 INJECTION INTRAMUSCULAR; INTRAVENOUS at 11:03

## 2019-02-15 RX ADMIN — FENTANYL CITRATE 50 MCG: 50 INJECTION INTRAMUSCULAR; INTRAVENOUS at 11:03

## 2019-02-15 RX ADMIN — MIDAZOLAM 1 MG: 1 INJECTION INTRAMUSCULAR; INTRAVENOUS at 10:34

## 2019-02-15 RX ADMIN — DOCUSATE SODIUM 100 MG: 100 CAPSULE, LIQUID FILLED ORAL at 08:11

## 2019-02-15 RX ADMIN — SENNOSIDES 8.6 MG: 8.6 TABLET, FILM COATED ORAL at 08:11

## 2019-02-15 NOTE — PROGRESS NOTES
[address] Internal Medicine    Progress Note - Lu Abraham 1985, 34 y o  male MRN: [id number]    Unit/Bed#: [facility] 918-01 Encounter: [id number]    Primary Care Provider: Maria Fernanda Oquendo MD   Date and time admitted to hospital: 2019  6:19 PM    * Proteinuria  Assessment & Plan  · Patient with acute chronic syndrome suspecting IgA nephropathy  Status post renal biopsy today in IR  Unfortunately patient had some subcapsular bleeding  I discussed this with the IR attending who performed the procedure, Delayed images showed a stable appearance of this and bleed did not increase in size  No anticoagulation for 48 hours including prophylaxis  Bedrest for 4 hours postprocedure  Will monitor clinically, for any vital changes or flank pain, IR attending recommending we contact department if any of this occurs  No need for repeat imaging prior to discharge unless any flank pain/hemodynamics changes per IR  · Nephrology is following, the plan will be for empiric steroids post biopsy  · Low-dose ACE-inhibitor to be started per Nephrology note  · Will monitor hemoglobin, if renal function remains stable, patient will likely discharge home within next 24 hours  Hematuria  Assessment & Plan  · Hematuria is probably related to nephropathy  · Will monitor hemoglobin    Elevated blood pressure reading  Assessment & Plan  · Plan for ACEI per nephrology although patient BP on soft side today  · Monitor     Pharmacologic: Pharmacologic VTE Prophylaxis contraindicated due to Post renal biopsy  Mechanical VTE Prophylaxis in Place: No    Patient Centered Rounds: I have performed bedside rounds with nursing staff today  Discussions with Specialists or Other Care Team Provider:  Nursing, case management, IR attending    Education and Discussions with Family / Patient:  Patient    Time Spent for Care: 30 minutes    More than 50% of total time spent on counseling and coordination of care as described above     Current Length of Stay: 1 day(s)    Current Patient Status: Inpatient   Certification Statement: The patient will continue to require additional inpatient hospital stay due to Monitor hemoglobin, renal function, monitor for signs and symptoms of worsening bleed post biopsy    Discharge Plan / Estimated Discharge Date:  Not medically stable, likely tomorrow if renal function stable and no issues overnight  Code Status: Level 1 - Full Code      Subjective:   Patient feels well  No complaints post biopsy  Continues to have some hematuria but improved  Some soreness at biopsy site but no flank pain  Objective:     Vitals:   Temp (24hrs), Av 5 °F (36 9 °C), Min:98 24 °F (36 8 °C), Max:98 8 °F (37 1 °C)    Temp:  [98 24 °F (36 8 °C)-98 8 °F (37 1 °C)] 98 24 °F (36 8 °C)  HR:  [52-89] 69  Resp:  [16-20] 17  BP: (108-148)/(58-75) 109/65  SpO2:  [93 %-100 %] 98 %  Body mass index is 21 kg/m²  Input and Output Summary (last 24 hours): Intake/Output Summary (Last 24 hours) at 2/15/2019 1456  Last data filed at 2/15/2019 1300  Gross per 24 hour   Intake 0 ml   Output 1895 ml   Net -1895 ml       Physical Exam:     Physical Exam   Constitutional: He is oriented to person, place, and time  No distress  HENT:   Head: Normocephalic  Eyes: Conjunctivae are normal    Neck: Normal range of motion  Cardiovascular: Normal rate and regular rhythm  Pulmonary/Chest: Effort normal and breath sounds normal    Abdominal: Soft  Bowel sounds are normal    Musculoskeletal: Normal range of motion  He exhibits no edema  Neurological: He is alert and oriented to person, place, and time  Skin: Skin is warm and dry  Left flank dressing CDI s/p biopsy   Psychiatric: He has a normal mood and affect  Nursing note and vitals reviewed        Additional Data:     Labs:    Results from last 7 days   Lab Units 02/15/19  0457   WBC Thousand/uL 8 97   HEMOGLOBIN g/dL 14 2   HEMATOCRIT % 41 0   PLATELETS Thousands/uL 353   NEUTROS PCT % 50   LYMPHS PCT % 38   MONOS PCT % 6   EOS PCT % 5     Results from last 7 days   Lab Units 02/15/19  0457   POTASSIUM mmol/L 4 2   CHLORIDE mmol/L 109*   CO2 mmol/L 25   BUN mg/dL 27*   CREATININE mg/dL 1 44*   CALCIUM mg/dL 8 8   ALK PHOS U/L 55   ALT U/L 24   AST U/L 19     Results from last 7 days   Lab Units 02/15/19  0808   INR  0 94         Recent Cultures (last 7 days):           Last 24 Hours Medication List:     Current Facility-Administered Medications:  acetaminophen 650 mg Oral Q6H PRN Simba Bateman MD   aluminum-magnesium hydroxide-simethicone 30 mL Oral Q6H PRN Simba Bateman MD   docusate sodium 100 mg Oral BID Simba Bateman MD   influenza vaccine 0 5 mL Intramuscular Prior to discharge Simba Bateman MD   ondansetron 4 mg Intravenous Q6H PRN Simba Bateman MD   senna 1 tablet Oral Daily Simba Bateman MD        Today, Patient Was Seen By: MATI Jordan    ** Please Note: Dragon 360 Dictation voice to text software may have been used in the creation of this document   **

## 2019-02-15 NOTE — SOCIAL WORK
CM met with patient and explained cm role  Pt alert and oriented  Pt reports he lives in a 2 story home with family  Pt denies DME, VNA, rehab  Pt reports being independent, reports good support from family and friends, he drives and will transport home with My[facility]ner [phone] for d/c  Pts pharmacy is [facility] [address]  No POA  Pt denies hx/admission for drug/etoh and psych/mental health  CM reviewed d/c planning process including the following: identifying help at home, patient preference for d/c planning needs, Discharge Lounge, [facility] Meds to Bed program, availability of treatment team to discuss questions or concerns patient and/or family may have regarding understanding medications and recognizing signs and symptoms once discharged  CM also encouraged patient to follow up with all recommended appointments after discharge  Patient advised of importance for patient and family to participate in managing patients medical well being

## 2019-02-15 NOTE — H&P
H&P- Jordana Code 1985, 34 y o  male MRN: [id number]    Unit/Bed#: [facility] 918-01 Encounter: [id number]    Primary Care Provider: Bhumi Mirza MD   Date and time admitted to hospital: 2/14/2019  6:19 PM             Elevated blood pressure reading  Assessment & Plan  Continue monitoring blood pressure  Tried to keep SBP greater than 130  If it is more than 130 will give hydralazine     Rapidly progressive glomerulonephritis  Assessment & Plan  Patient has elevated protein in the urine, has hematuria and RBC casts, creatinine has increased to 1 50  Patient is concern for rapidly progressive glomerulonephritis, he is being sent from Nephrology office  For workup and renal biopsy according to [place]  Possibly he is going to be started on steroids  Will obtain Nephrology consult  Continue monitoring patient closely     Proteinuria  Assessment & Plan  Patient has protein urea, done on 24 hour urine protein with 4 g of protein  He has mild blood pressure elevation and RBC casts concerning for progressive glomerular nephritis  He was being directly admitted from Nephrology                 Hematuria  Assessment & Plan  Hematuria is probably related to nephritis  Will continue to monitor CBC daily  Transfuse if less than 7                 VTE Prophylaxis: Enoxaparin (Lovenox)  / sequential compression device   Code Status: full code  POLST: POLST is not applicable to this patient  Discussion with family:       Anticipated Length of Stay:  Patient will be admitted on an Inpatient basis with an anticipated length of stay of  More than 2 midnights  Justification for Hospital Stay: due to work up for Nephritis and renal biopsy     Total Time for Visit, including Counseling / Coordination of Care: 45 minutes    Greater than 50% of this total time spent on direct patient counseling and coordination of care      Chief Complaint:   Blood in urine     History of Present Illness:     Jordana Cazares is a 34 y o  male who presents with history of microhematuria, workup shows 4 g of protein in 24 hour urine, worsening of creatinine to 1 50, red blood cell casts all concerning for rapidly progressive glomerulonephritis  He has a strong family history of where his grandfather and uncles were placed on dialysis at an early age  He would require renal biopsy and further workup to prevent getting onto either transplant list or going on dialysis in near future  He was transferred from Nephrology office       Review of Systems:     Review of Systems   Constitutional: Negative for activity change, appetite change, chills, diaphoresis, fatigue, fever and unexpected weight change  HENT: Negative  Eyes: Negative  Respiratory: Negative  Cardiovascular: Negative  Gastrointestinal: Negative  Endocrine: Negative  Genitourinary: Negative  Musculoskeletal: Negative  Skin: Negative  Allergic/Immunologic: Negative  Neurological: Negative  Hematological: Negative      Psychiatric/Behavioral: Negative           Past Medical and Surgical History:           Past Medical History:   Diagnosis Date    Hematuria      Proteinuria                 Past Surgical History:   Procedure Laterality Date    CLAVICLE SURGERY             Meds/Allergies:     Prior to Admission medications    Not on File      I have reviewed home medications with patient personally      Allergies: No Known Allergies     Social History:     Marital Status: Unknown   Occupation:    Patient Pre-hospital Living Situation: live at home  Patient Pre-hospital Level of Mobility: able to ambulate  Patient Pre-hospital Diet Restrictions: none  Substance Use History:   Social History           Substance and Sexual Activity   Alcohol Use Yes    Frequency: Monthly or less    Drinks per session: 1 or 2    Binge frequency: Less than monthly      Social History          Tobacco Use   Smoking Status Never Smoker   Smokeless Tobacco Never Used      Social History        Substance and Sexual Activity   Drug Use Never         Family History:           Family History   Problem Relation Age of Onset    Breast cancer Mother      No Known Problems Father      Kidney disease Maternal Uncle      Kidney disease Maternal Grandfather           Physical Exam:      Vitals:   Height: 5' 11" (180 3 cm) (02/14/19 1834)  Weight - Scale: 69 6 kg (153 lb 7 oz) (02/14/19 1834)     Physical Exam   Constitutional: He is oriented to person, place, and time  He appears well-developed and well-nourished  No distress  HENT:   Head: Normocephalic and atraumatic  Right Ear: External ear normal    Left Ear: External ear normal    Nose: Nose normal    Mouth/Throat: Oropharynx is clear and moist    Eyes: No scleral icterus  Neck: Normal range of motion  Neck supple  No thyromegaly present  Cardiovascular: Normal rate  No murmur heard  Pulmonary/Chest: Effort normal and breath sounds normal  No respiratory distress  Abdominal: Soft  Bowel sounds are normal    Musculoskeletal: He exhibits edema  He exhibits no tenderness or deformity  Neurological: He is alert and oriented to person, place, and time  He displays normal reflexes  No cranial nerve deficit or sensory deficit  He exhibits normal muscle tone  Coordination normal    Skin: Skin is warm and dry  He is not diaphoretic  No erythema  No pallor  Nursing note and vitals reviewed               Additional Data:      Lab Results: I have personally reviewed pertinent reports                                   Imaging: I have personally reviewed pertinent reports        No orders to display         EKG, Pathology, and Other Studies Reviewed on Admission:   · EKG: pending     Allscripts / Epic Records Reviewed: Yes      ** Please Note: This note has been constructed using a voice recognition system   **

## 2019-02-15 NOTE — DISCHARGE INSTRUCTIONS
Percutaneous Kidney Biopsy   WHAT YOU NEED TO KNOW:   A percutaneous kidney biopsy is a procedure to remove a small sample of kidney tissue  It may also be done to check for kidney disease or cancer  DISCHARGE INSTRUCTIONS:   Follow up with your healthcare provider as directed:  Write down your questions so you remember to ask them during your visits  Wound care: The Band-Aid may be removed in 24 hours  For more information:   · National Kid[employer] and [employer] Information Clearinghouse  [address] [address] [place], [place] [address]  Phone: 5- 278 [phone]  Web Address: http://kidney  niddk nih gov/   Care after your procedure:    1  Limit your activities for 36 hours after your biopsy  2  No driving day of biopsy  3  Return to your normal diet  Flat sips of flat soda helps with mild nausea  4  Remove band-aid or dressing 24 hours after procedure  Contact Interventional Radiology at [phone]    Ruth PATIENTS: Contact Interventional Radiology at [phone]) Quentin Shannon PATIENTS: Contact Interventional Radiology at [phone]) if:    1  Difficulty breathing, nausea or vomiting  2  You feel weak or dizzy  3  Chills or fever above 101 degrees F  You have persistent nausea or vomiting    4  You have severe pain in your abdomen or where You feel weak or dizzy  your           procedure was done  5  You have blood in your urine  You urinate small amounts or not at all  4  Develop any redness, swelling, heat, unusual drainage, heavy bruising or bleeding     from biopsy site

## 2019-02-15 NOTE — ASSESSMENT & PLAN NOTE
· Patient with acute chronic syndrome suspecting IgA nephropathy  Status post renal biopsy today in IR  Unfortunately patient had some subcapsular bleeding  I discussed this with the IR attending who performed the procedure, Delayed images showed a stable appearance of this and bleed did not increase in size  No anticoagulation for 48 hours including prophylaxis  Bedrest for 4 hours postprocedure  Will monitor clinically, for any vital changes or flank pain, IR attending recommending we contact department if any of this occurs  No need for repeat imaging prior to discharge unless any flank pain/hemodynamics changes per IR  · Nephrology is following, the plan will be for empiric steroids post biopsy  · Low-dose ACE-inhibitor to be started per Nephrology note  · Will monitor hemoglobin, if renal function remains stable, patient will likely discharge home within next 24 hours

## 2019-02-15 NOTE — PLAN OF CARE
Problem: PAIN - ADULT  Goal: Verbalizes/displays adequate comfort level or baseline comfort level  Description  Interventions:  - Encourage patient to monitor pain and request assistance  - Assess pain using appropriate pain scale  - Administer analgesics based on type and severity of pain and evaluate response  - Implement non-pharmacological measures as appropriate and evaluate response  - Consider cultural and social influences on pain and pain management  - Notify physician/advanced practitioner if interventions unsuccessful or patient reports new pain  Outcome: Progressing     Problem: INFECTION - ADULT  Goal: Absence or prevention of progression during hospitalization  Description  INTERVENTIONS:  - Assess and monitor for signs and symptoms of infection  - Monitor lab/diagnostic results  - Monitor all insertion sites, i e  indwelling lines, tubes, and drains  - Monitor endotracheal (as able) and nasal secretions for changes in amount and color  - Sidney appropriate cooling/warming therapies per order  - Administer medications as ordered  - Instruct and encourage patient and family to use good hand hygiene technique  - Identify and instruct in appropriate isolation precautions for identified infection/condition  Outcome: Progressing  Goal: Absence of fever/infection during neutropenic period  Description  INTERVENTIONS:  - Monitor WBC     Outcome: Progressing     Problem: SAFETY ADULT  Goal: Patient will remain free of falls  Description  INTERVENTIONS:  - Assess patient frequently for physical needs     Outcome: Progressing  Goal: Maintain or return to baseline ADL function  Description  INTERVENTIONS:  -  Assess patient's ability to carry out ADLs; assess patient's baseline for ADL function and identify physical deficits which impact ability to perform ADLs (bathing, care of mouth/teeth, toileting, grooming, dressing, etc )  - Assess/evaluate cause of self-care deficits   - Assess range of motion  - Assess patient's mobility; develop plan if impaired  - Assess patient's need for assistive devices and provide as appropriate  - Encourage maximum independence but intervene and supervise when necessary  ¯ Involve family in performance of ADLs  ¯ Assess for home care needs following discharge   ¯ Request OT consult to assist with ADL evaluation and planning for discharge  ¯ Provide patient education as appropriate  Outcome: Progressing  Goal: Maintain or return mobility status to optimal level  Description  INTERVENTIONS:  - Assess patient's baseline mobility status (ambulation, transfers, stairs, etc )    - Identify cognitive and physical deficits and behaviors that affect mobility  - Identify mobility aids required to assist with transfers and/or ambulation (gait belt, sit-to-stand, lift, walker, cane, etc )  - Sidney fall precautions as indicated by assessment  - Record patient progress and toleration of activity level on Mobility SBAR; progress patient to next Phase/Stage  - Instruct patient to call for assistance with activity based on assessment  - Request Rehabilitation consult to assist with strengthening/weightbearing, etc   Outcome: Progressing     Problem: DISCHARGE PLANNING  Goal: Discharge to home or other facility with appropriate resources  Description  INTERVENTIONS:  - Identify barriers to discharge w/patient and caregiver  - Arrange for needed discharge resources and transportation as appropriate  - Identify discharge learning needs (meds, wound care, etc )  - Arrange for interpretive services to assist at discharge as needed  - Refer to Case Management Department for coordinating discharge planning if the patient needs post-hospital services based on physician/advanced practitioner order or complex needs related to functional status, cognitive ability, or social support system  Outcome: Progressing     Problem: Knowledge Deficit  Goal: Patient/family/caregiver demonstrates understanding of disease process, treatment plan, medications, and discharge instructions  Description  Complete learning assessment and assess knowledge base    Interventions:  - Provide teaching at level of understanding  - Provide teaching via preferred learning methods  Outcome: Progressing

## 2019-02-15 NOTE — BRIEF OP NOTE (RAD/CATH)
IR RENAL BIOPSY    PATIENT NAME: Christiano Andrade  : 1985  MRN: [id number]     Pre-op Diagnosis:   1  Hematuria    2  Proteinuria    3  Rapidly progressive glomerulonephritis    4  Elevated blood pressure reading      Post-op Diagnosis:   1  Hematuria    2  Proteinuria    3  Rapidly progressive glomerulonephritis    4   Elevated blood pressure reading        Surgeon:   Karuna Palacios MD  Assistants:     Estimated Blood Loss: minimal external  Findings:     Left renal core biopsy, 18 gauge, nontargeted    D stat for hemostasis    Specimens: cores to pathology    Complications:  Small subcapsular hematoma, unchanged after 5 minutes    Anesthesia: Conscious sedation    Karuna Palacios MD     Date: 2/15/2019  Time: 11:34 AM

## 2019-02-15 NOTE — CONSULTS
Consultation - Nephrology   Terrell Mcdaniels 34 y o  male MRN: [id number]  Unit/Bed#: [facility] 918-01 Encounter: [id number]      Assessment/Plan:  1  Acute nephritic syndrome, suspecting IgA nephropathy given clinical history, serologies are currently pending, hepatitis profile is negative, HIV negative, complements normal, pending LES, anti-double-stranded DNA, pending SPEP/UPEP, pending and GBM antibody, pending ANCA  2  Nephrotic range proteinuria, 4 g seen on 24 hour urine  3  Extensive family medical history for kidney disease as well as end-stage renal disease    Plan:  · Plan for renal biopsy again suspecting likely IgA nephropathy  · Will likely start empiric steroids post biopsy  · Would also start low-dose ACE-inhibitor lisinopril 2 5 mg daily  · Monitor hemoglobin post biopsy, assuming stable renal function stable hemoglobin likely stable for discharge in next 24 hr    History of Present Illness   Physician Requesting Consult: Jeni Grimaldo, DO  Reason for Consult / Principal Problem:  Elevated creatinine, nephrotic range proteinuria  HPI: Terrell Mcdaniels is a 34y o  year old male who presents with persistent proteinuria, hematuria and a creatinine of 1 5    Patient is a 73-year-old male who was recently seen on our office for nephrology consultation with Dr Eliot Fraire for nephrotic range proteinuria as well as elevated creatinine of 1 5  Historically states that he has had microscopic hematuria as well as from urine since the age of 8  Although recently he states the from urine has been much more persistent  Also complains of occasional nausea 1-2 times per month with apparent ammonia breath on occasion  Denies any chest pain shortness of breath or swelling  Denies abdominal pain  Denies any rash  Denies any significant arthritis or joint swelling      History obtained from chart review and the patient    Review of Systems    Review of Systems: pertinent findings as noted above otherwise negative    Historical Information   Patient Active Problem List   Diagnosis    Hematuria    Proteinuria    Rapidly progressive glomerulonephritis    Elevated blood pressure reading     Past Medical History:   Diagnosis Date    Hematuria     Proteinuria      Past Surgical History:   Procedure Laterality Date    CLAVICLE SURGERY       Social History   Social History     Substance and Sexual Activity   Alcohol Use Yes    Frequency: Monthly or less    Drinks per session: 1 or 2    Binge frequency: Less than monthly     Social History     Substance and Sexual Activity   Drug Use Never     Social History     Tobacco Use   Smoking Status Never Smoker   Smokeless Tobacco Never Used     Family History   Problem Relation Age of Onset    Breast cancer Mother     No Known Problems Father     Kidney disease Maternal Uncle     Kidney disease Maternal Grandfather        Meds/Allergies   current meds:   Current Facility-Administered Medications   Medication Dose Route Frequency    acetaminophen (TYLENOL) tablet 650 mg  650 mg Oral Q6H PRN    aluminum-magnesium hydroxide-simethicone (MYLANTA) 200-200-20 mg/5 mL oral suspension 30 mL  30 mL Oral Q6H PRN    docusate sodium (COLACE) capsule 100 mg  100 mg Oral BID    influenza vaccine, quadrivalent (FLULAVAL) IM injection 0 5 mL  0 5 mL Intramuscular Prior to discharge    ondansetron (ZOFRAN) injection 4 mg  4 mg Intravenous Q6H PRN    senna (SENOKOT) tablet 8 6 mg  1 tablet Oral Daily       No Known Allergies      Objective   /66 (BP Location: Left arm)   Pulse 79   Temp 98 24 °F (36 8 °C) (Oral)   Resp 16   Ht 5' 11" (1 803 m)   Wt 68 3 kg (150 lb 9 2 oz)   SpO2 93%   BMI 21 00 kg/m²     Intake/Output Summary (Last 24 hours) at 2/15/2019 1007  Last data filed at 2/15/2019 1908  Gross per 24 hour   Intake 0 ml   Output 1620 ml   Net -1620 ml       Current Weight: Weight - Scale: 68 3 kg (150 lb 9 2 oz)    Physical Exam   Constitutional: He is oriented to person, place, and time  No distress  HENT:   Head: Normocephalic  Eyes: No scleral icterus  Neck: Neck supple  Cardiovascular: Normal rate and regular rhythm  Pulmonary/Chest: Effort normal and breath sounds normal    Abdominal: Soft  Bowel sounds are normal  He exhibits no distension  There is no tenderness  Neurological: He is alert and oriented to person, place, and time  Skin: Skin is warm and dry  No rash noted  Psychiatric: He has a normal mood and affect   His behavior is normal          Lab Results:    Results from last 7 days   Lab Units 02/15/19  0457   WBC Thousand/uL 8 97   HEMOGLOBIN g/dL 14 2   HEMATOCRIT % 41 0   PLATELETS Thousands/uL 353     Results from last 7 days   Lab Units 02/15/19  0457   POTASSIUM mmol/L 4 2   CHLORIDE mmol/L 109*   CO2 mmol/L 25   BUN mg/dL 27*   CREATININE mg/dL 1 44*   CALCIUM mg/dL 8 8

## 2019-02-15 NOTE — UTILIZATION REVIEW
Initial Clinical Review    Admission: Date/Time/Statement: 2/14/19 @ 1819 Inpatient Written   Orders Placed This Encounter   Procedures    Inpatient Admission     Standing Status:   Standing     Number of Occurrences:   1     Order Specific Question:   Admitting Physician     Answer:   Tawana Barbosa [[id number]]     Order Specific Question:   Level of Care     Answer:   Med Surg [[id number]]     Order Specific Question:   Estimated length of stay     Answer:   More than 2 Midnights     Order Specific Question:   Certification     Answer:   I certify that inpatient services are medically necessary for this patient for a duration of greater than two midnights  See H&P and MD Progress Notes for additional information about the patient's course of treatment  Chief Complaint: Blood in urine  History of Illness: Dany Paul a 34 y  o  male who presents with history of microhematuria, workup shows 4 g of protein in 24 hour urine, worsening of creatinine to 1 50, red blood cell casts all concerning for rapidly progressive glomerulonephritis   He has a strong family history of where his grandfather and uncles were placed on dialysis at an early age   He would require renal biopsy and further workup to prevent getting onto either transplant list or going on dialysis in near future   He was transferred from Nephrology office    Vital Signs:    Triage Vitals   Temperature Pulse Respirations Blood Pressure SpO2   02/14/19 1825 02/14/19 1825 02/14/19 1825 02/14/19 1825 02/14/19 1825   98 8 °F (37 1 °C) 72 20 125/74 97 %      Temp src Heart Rate Source Patient Position - Orthostatic VS BP Location FiO2 (%)   -- -- -- -- --             Pain Score       02/14/19 1930       No Pain        Wt Readings from Last 1 Encounters:   02/15/19 68 3 kg (150 lb 9 2 oz)     Vital Signs (abnormal): WNL  Pertinent Labs/Diagnostic Test Results: HCT 36 3, , BUN 29, CREAT 1 43  Prot/Creat Ratio, Ur 2  90High       RBC, UA 20-30Abnormal 2/14/19 2024   Rapid HIV 1 AND 2 Non-Reactive    HIV-1 P24 Ag Screen Non-Reactive      Component      Latest Ref Rng & Units 2/14/2019 2/14/2019          10:53 AM 10:07 PM   Leukocytes, UA      Negative NEGATIVE Negative   Nitrite, UA      Negative NEGATIVE Negative   SL AMB POCT UROBILINOGEN      0 2, 1 0 E U /dl E U /dl 0 2 0 2   POCT URINE PROTEIN      Negative mg/dl 4 300 (3+) (A)   pH, UA      4 5 - 8 0 6 0 5 5   Blood, UA      Negative 3 Large (A)   SL AMB SPECIFIC GRAVITY-URINE      1 003 - 1 030 1 020 1 013   Ketones, UA      Negative mg/dl NEGATIVE Negative   BILIRUBIN,UA       NEGATIVE    Glucose, UA      Negative mg/dl NEGATIVE Negative   Color, UA       YELLOW Yellow   Clarity, UA       CLEAR Clear     Past Medical/Surgical History:    Active Ambulatory Problems     Diagnosis Date Noted    Hematuria 02/14/2019    Proteinuria 02/14/2019    Rapidly progressive glomerulonephritis 02/14/2019    Elevated blood pressure reading 02/14/2019     Past Medical History:   Diagnosis Date    Hematuria     Proteinuria      Admitting Diagnosis: Proteinuria [R80 9]  Age/Sex: 34 y o  male  Assessment/Plan:   Elevated blood pressure reading  Assessment & Plan  Continue monitoring blood pressure  Tried to keep SBP greater than 130  If it is more than 130 will give hydralazine  Rapidly progressive glomerulonephritis  Assessment & Plan  Patient has elevated protein in the urine, has hematuria and RBC casts, creatinine has increased to 1 50  Patient is concern for rapidly progressive glomerulonephritis, he is being sent from Nephrology office  For workup and renal biopsy according to [place]  Possibly he is going to be started on steroids  Will obtain Nephrology consult  Continue monitoring patient closely  Proteinuria  Assessment & Plan  Patient has protein urea, done on 24 hour urine protein with 4 g of protein  He has mild blood pressure elevation and RBC casts concerning for progressive glomerular nephritis  He was being directly admitted from Nephrology  Hematuria  Assessment & Plan  Hematuria is probably related to nephritis  Will continue to monitor CBC daily  Transfuse if less than 7  VTE Prophylaxis: Enoxaparin (Lovenox)  / sequential compression device   Anticipated Length of Stay: Fermin Mcnamara will be admitted on an Inpatient basis with an anticipated length of stay of  More than 2 midnights    Justification for Hospital Stay: due to work up for Nephritis and renal biopsy  Admission Orders:  NPO  Daily weights, I/O  IR image guided biopsy/aspiration kidney  Consult nephrology  Scheduled Meds:   Current Facility-Administered Medications:  acetaminophen 650 mg Oral Q6H PRN   aluminum-magnesium hydroxide-simethicone 30 mL Oral Q6H PRN   docusate sodium 100 mg Oral BID   influenza vaccine 0 5 mL Intramuscular Prior to discharge   ondansetron 4 mg Intravenous Q6H PRN   senna 1 tablet Oral Daily     Continuous Infusions:    PRN Meds:   acetaminophen    aluminum-magnesium hydroxide-simethicone    influenza vaccine    ondansetron    Network Utilization Review Department  Phone: [phone]; Fax [phone]  [email or web address]  ATTENTION: Please call with any questions or concerns to [phone]  and carefully listen to the prompts so that you are directed to the right person  Send all requests for admission clinical reviews, approved or denied determinations and any other requests to fax [phone]   All voicemails are confidential

## 2019-02-15 NOTE — PLAN OF CARE
Problem: PAIN - ADULT  Goal: Verbalizes/displays adequate comfort level or baseline comfort level  Description  Interventions:  - Encourage patient to monitor pain and request assistance  - Assess pain using appropriate pain scale  - Administer analgesics based on type and severity of pain and evaluate response  - Implement non-pharmacological measures as appropriate and evaluate response  - Consider cultural and social influences on pain and pain management  - Notify physician/advanced practitioner if interventions unsuccessful or patient reports new pain  Outcome: Progressing     Problem: INFECTION - ADULT  Goal: Absence or prevention of progression during hospitalization  Description  INTERVENTIONS:  - Assess and monitor for signs and symptoms of infection  - Monitor lab/diagnostic results  - Monitor all insertion sites, i e  indwelling lines, tubes, and drains  - Monitor endotracheal (as able) and nasal secretions for changes in amount and color  - [place] appropriate cooling/warming therapies per order  - Administer medications as ordered  - Instruct and encourage patient and family to use good hand hygiene technique  - Identify and instruct in appropriate isolation precautions for identified infection/condition  Outcome: Progressing  Goal: Absence of fever/infection during neutropenic period  Description  INTERVENTIONS:  - Monitor WBC     Outcome: Progressing     Problem: SAFETY ADULT  Goal: Patient will remain free of falls  Description  INTERVENTIONS:  - Assess patient frequently for physical needs     Outcome: Progressing  Goal: Maintain or return to baseline ADL function  Description  INTERVENTIONS:  -  Assess patient's ability to carry out ADLs; assess patient's baseline for ADL function and identify physical deficits which impact ability to perform ADLs (bathing, care of mouth/teeth, toileting, grooming, dressing, etc )  - Assess/evaluate cause of self-care deficits   - Assess range of motion  - Assess patient's mobility; develop plan if impaired  - Assess patient's need for assistive devices and provide as appropriate  - Encourage maximum independence but intervene and supervise when necessary  ¯ Involve family in performance of ADLs  ¯ Assess for home care needs following discharge   ¯ Request OT consult to assist with ADL evaluation and planning for discharge  ¯ Provide patient education as appropriate  Outcome: Progressing  Goal: Maintain or return mobility status to optimal level  Description  INTERVENTIONS:  - Assess patient's baseline mobility status (ambulation, transfers, stairs, etc )    - Identify cognitive and physical deficits and behaviors that affect mobility  - Identify mobility aids required to assist with transfers and/or ambulation (gait belt, sit-to-stand, lift, walker, cane, etc )  - [place] fall precautions as indicated by assessment  - Record patient progress and toleration of activity level on Mobility SBAR; progress patient to next Phase/Stage  - Instruct patient to call for assistance with activity based on assessment  - Request Rehabilitation consult to assist with strengthening/weightbearing, etc   Outcome: Progressing     Problem: DISCHARGE PLANNING  Goal: Discharge to home or other facility with appropriate resources  Description  INTERVENTIONS:  - Identify barriers to discharge w/patient and caregiver  - Arrange for needed discharge resources and transportation as appropriate  - Identify discharge learning needs (meds, wound care, etc )  - Arrange for interpretive services to assist at discharge as needed  - Refer to Case Management Department for coordinating discharge planning if the patient needs post-hospital services based on physician/advanced practitioner order or complex needs related to functional status, cognitive ability, or social support system  Outcome: Progressing     Problem: Knowledge Deficit  Goal: Patient/family/caregiver demonstrates understanding of disease process, treatment plan, medications, and discharge instructions  Description  Complete learning assessment and assess knowledge base    Interventions:  - Provide teaching at level of understanding  - Provide teaching via preferred learning methods  Outcome: Progressing

## 2019-02-15 NOTE — TREATMENT PLAN
Chart reviewed   Cr stable/slightly improved from prior     - agree with holding solumed for tonight  - IR team already consulted by Primary Team for renal biopsy  - reviewed labs already drawn - add ANCA, Anti GBM, ASO, PLA2R, UPCR - ordered

## 2019-02-15 NOTE — PLAN OF CARE
Problem: DISCHARGE PLANNING - CARE MANAGEMENT  Goal: Discharge to post-acute care or home with appropriate resources  Description  INTERVENTIONS:  - Conduct assessment to determine patient/family and health care team treatment goals, and need for post-acute services based on payer coverage, community resources, and patient preferences, and barriers to discharge  - Address psychosocial, clinical, and financial barriers to discharge as identified in assessment in conjunction with the patient/family and health care team  - Arrange appropriate level of post-acute services according to patient's   needs and preference and payer coverage in collaboration with the physician and health care team  - Communicate with and update the patient/family, physician, and health care team regarding progress on the discharge plan  - Arrange appropriate transportation to post-acute venues  - Discharge to home when medically cleared   Outcome: Progressing

## 2019-02-15 NOTE — ASSESSMENT & PLAN NOTE
Patient has elevated protein in the urine, has hematuria and RBC casts, creatinine has increased to 1 50  Patient is concern for rapidly progressive glomerulonephritis, he is being sent from Nephrology office  For workup and renal biopsy according to Christopher  Possibly he is going to be started on steroids  Will obtain Nephrology consult  Continue monitoring patient closely

## 2019-02-15 NOTE — INTERVAL H&P NOTE
Update:       72-year-old with family history of renal disorders, concern for glomerulonephritis with proteinuria and hematuria  Biopsy for diagnosis  We will perform this under CT with moderate sedation  I discussed the risks, including bleeding with the patient, he wished to proceed  MP 2 ASA 2    Patient re-evaluated   Accept as history and physical     Mendy Guerra MD/February 15, 2019/10:07 AM

## 2019-02-15 NOTE — ASSESSMENT & PLAN NOTE
Continue monitoring blood pressure  Tried to keep SBP greater than 130  If it is more than 130 will give hydralazine

## 2019-02-15 NOTE — ASSESSMENT & PLAN NOTE
Patient has protein urea, done on 24 hour urine protein with 4 g of protein  He has mild blood pressure elevation and RBC casts concerning for progressive glomerular nephritis  He was being directly admitted from Nephrology

## 2019-02-15 NOTE — ASSESSMENT & PLAN NOTE
Hematuria is probably related to nephritis  Will continue to monitor CBC daily  Transfuse if less than 7

## 2019-02-16 VITALS
HEART RATE: 62 BPM | OXYGEN SATURATION: 97 % | WEIGHT: 149.69 LBS | HEIGHT: 71 IN | BODY MASS INDEX: 20.96 KG/M2 | RESPIRATION RATE: 14 BRPM | SYSTOLIC BLOOD PRESSURE: 124 MMHG | DIASTOLIC BLOOD PRESSURE: 68 MMHG | TEMPERATURE: 98.6 F

## 2019-02-16 PROBLEM — R03.0 ELEVATED BLOOD PRESSURE READING: Status: RESOLVED | Noted: 2019-02-14 | Resolved: 2019-02-16

## 2019-02-16 LAB
ALBUMIN SERPL BCP-MCNC: 2.7 G/DL (ref 3.5–5)
ALP SERPL-CCNC: 59 U/L (ref 46–116)
ALT SERPL W P-5'-P-CCNC: 20 U/L (ref 12–78)
ANION GAP SERPL CALCULATED.3IONS-SCNC: 4 MMOL/L (ref 4–13)
AST SERPL W P-5'-P-CCNC: 13 U/L (ref 5–45)
BASOPHILS # BLD AUTO: 0.06 THOUSANDS/ΜL (ref 0–0.1)
BASOPHILS NFR BLD AUTO: 1 % (ref 0–1)
BILIRUB SERPL-MCNC: 1.23 MG/DL (ref 0.2–1)
BUN SERPL-MCNC: 26 MG/DL (ref 5–25)
CALCIUM SERPL-MCNC: 8.6 MG/DL (ref 8.3–10.1)
CHLORIDE SERPL-SCNC: 108 MMOL/L (ref 100–108)
CO2 SERPL-SCNC: 27 MMOL/L (ref 21–32)
CREAT SERPL-MCNC: 1.6 MG/DL (ref 0.6–1.3)
DSDNA AB SER-ACNC: 3 IU/ML (ref 0–9)
EOSINOPHIL # BLD AUTO: 0.39 THOUSAND/ΜL (ref 0–0.61)
EOSINOPHIL NFR BLD AUTO: 5 % (ref 0–6)
ERYTHROCYTE [DISTWIDTH] IN BLOOD BY AUTOMATED COUNT: 12.1 % (ref 11.6–15.1)
GFR SERPL CREATININE-BSD FRML MDRD: 57 ML/MIN/1.73SQ M
GLUCOSE SERPL-MCNC: 100 MG/DL (ref 65–140)
HCT VFR BLD AUTO: 38.5 % (ref 36.5–49.3)
HGB BLD-MCNC: 13.1 G/DL (ref 12–17)
IMM GRANULOCYTES # BLD AUTO: 0.02 THOUSAND/UL (ref 0–0.2)
IMM GRANULOCYTES NFR BLD AUTO: 0 % (ref 0–2)
LYMPHOCYTES # BLD AUTO: 2.4 THOUSANDS/ΜL (ref 0.6–4.47)
LYMPHOCYTES NFR BLD AUTO: 28 % (ref 14–44)
MCH RBC QN AUTO: 32.3 PG (ref 26.8–34.3)
MCHC RBC AUTO-ENTMCNC: 34 G/DL (ref 31.4–37.4)
MCV RBC AUTO: 95 FL (ref 82–98)
MONOCYTES # BLD AUTO: 0.61 THOUSAND/ΜL (ref 0.17–1.22)
MONOCYTES NFR BLD AUTO: 7 % (ref 4–12)
NEUTROPHILS # BLD AUTO: 5.25 THOUSANDS/ΜL (ref 1.85–7.62)
NEUTS SEG NFR BLD AUTO: 59 % (ref 43–75)
NRBC BLD AUTO-RTO: 0 /100 WBCS
PLATELET # BLD AUTO: 325 THOUSANDS/UL (ref 149–390)
PMV BLD AUTO: 11.5 FL (ref 8.9–12.7)
POTASSIUM SERPL-SCNC: 4.2 MMOL/L (ref 3.5–5.3)
PROT SERPL-MCNC: 5.6 G/DL (ref 6.4–8.2)
RBC # BLD AUTO: 4.06 MILLION/UL (ref 3.88–5.62)
SODIUM SERPL-SCNC: 139 MMOL/L (ref 136–145)
WBC # BLD AUTO: 8.73 THOUSAND/UL (ref 4.31–10.16)

## 2019-02-16 PROCEDURE — 99232 SBSQ HOSP IP/OBS MODERATE 35: CPT | Performed by: INTERNAL MEDICINE

## 2019-02-16 PROCEDURE — 80053 COMPREHEN METABOLIC PANEL: CPT | Performed by: NURSE PRACTITIONER

## 2019-02-16 PROCEDURE — 99238 HOSP IP/OBS DSCHRG MGMT 30/<: CPT | Performed by: NURSE PRACTITIONER

## 2019-02-16 PROCEDURE — 85025 COMPLETE CBC W/AUTO DIFF WBC: CPT | Performed by: NURSE PRACTITIONER

## 2019-02-16 RX ORDER — PREDNISONE 10 MG/1
30 TABLET ORAL 2 TIMES DAILY WITH MEALS
Qty: 120 TABLET | Refills: 0 | Status: SHIPPED | OUTPATIENT
Start: 2019-02-16 | End: 2019-02-16 | Stop reason: SDUPTHER

## 2019-02-16 RX ORDER — FAMOTIDINE 20 MG/1
20 TABLET, FILM COATED ORAL DAILY
Qty: 30 TABLET | Refills: 0 | Status: SHIPPED | OUTPATIENT
Start: 2019-02-16 | End: 2019-03-06 | Stop reason: ALTCHOICE

## 2019-02-16 RX ORDER — PREDNISONE 10 MG/1
30 TABLET ORAL 2 TIMES DAILY WITH MEALS
Qty: 180 TABLET | Refills: 0 | Status: SHIPPED | OUTPATIENT
Start: 2019-02-16 | End: 2019-03-06 | Stop reason: ALTCHOICE

## 2019-02-16 RX ADMIN — SENNOSIDES 8.6 MG: 8.6 TABLET, FILM COATED ORAL at 07:54

## 2019-02-16 RX ADMIN — PREDNISONE 30 MG: 10 TABLET ORAL at 07:54

## 2019-02-16 RX ADMIN — DOCUSATE SODIUM 100 MG: 100 CAPSULE, LIQUID FILLED ORAL at 07:54

## 2019-02-16 NOTE — DISCHARGE SUMMARY
[facility]    Patient Information: Olivia Goode 34 y o  male MRN: [id number]  Unit/Bed#: [facility] 918-01 Encounter: [id number]    Discharging Physician / Practitioner: MATI Simon  PCP: Argenis Mcfadden MD  Admission Date: 2/14/2019  Discharge Date: 02/16/19    Reason for Admission: need for renal biopsy     Discharge Diagnoses:     Principal Problem:    Proteinuria  Active Problems:    Hematuria    Rapidly progressive glomerulonephritis  Resolved Problems:    Elevated blood pressure reading      Consultations During Hospital Stay:  · Nephrology  · IR    Procedures Performed:     · Renal biopsy for core biopsy of left kidney, small subcapsular hematoma  Significant Findings / Test Results:     · Creatinine on day of discharge 1 60  · Hemoglobin stable at 13 1    Incidental Findings:   · None     Test Results Pending at Discharge (will require follow up): · Renal biopsy results      Outpatient Tests Requested:  · BMP in one week  · Follow up with nephrology for results of renal bx     Complications:  None     Hospital Course:     Olivia Goode is a 34 y o  male patient with a PMHx of acute nephritis who originally presented to the hospital on 2/14/2019 for renal biopsy  Patient has been following with Nephrology Dr Ghada Lainez as an outpatient  He presented to the hospital for IR for renal biopsy  Post biopsy there was a small subcapsular hematoma, delayed imaging was unremarkable as far as enlarging size  Patient did well postprocedure, no flank pain  Hemodynamically stable  Hemoglobin stable  Discussed directly with Dr Fabian Alvarado, recommending prednisone 30 mg p o  B i d  Along with Pepcid at discharge  Will hold off on ACE-inhibitor given creatinine with slight bump today  Will repeat BMP in 1 week as an outpatient with outpatient follow-up with primary nephrologist for lab results as well as biopsy results    Patient had been questioning workup as far as siblings as there seems to be her added tarry component to this, discussed with Nephrology, recommending routine urinalysis, urine protein creatinine ratio through PCP with referral to Nephrology if abnormal for siblings  Discharge instructions reviewed  Patient feels well  Stable for discharge with follow ups as above  Condition at Discharge: stable     Discharge Day Visit / Exam:     Subjective:  Patient offers no complaints  Feels well  No flank pain  Wants to go home  Vitals: Blood Pressure: 124/68 (02/15/19 2205)  Pulse: 62 (02/15/19 2205)  Temperature: 98 6 °F (37 °C) (02/15/19 2205)  Temp Source: Oral (02/15/19 1425)  Respirations: 14 (02/15/19 2205)  Height: 5' 11" (180 3 cm) (02/14/19 1834)  Weight - Scale: 67 9 kg (149 lb 11 1 oz) (02/16/19 9553)  SpO2: 97 % (02/15/19 2205)  Exam:   Physical Exam   Constitutional: He is oriented to person, place, and time  No distress  HENT:   Head: Normocephalic  Eyes: Conjunctivae are normal    Neck: Normal range of motion  Cardiovascular: Normal rate and regular rhythm  Pulmonary/Chest: Effort normal and breath sounds normal    Abdominal: Soft  Bowel sounds are normal    Musculoskeletal: Normal range of motion  Neurological: He is alert and oriented to person, place, and time  Skin: Skin is warm and dry  Left flank dressing CDI   Nursing note and vitals reviewed  Discussion with Family:  Patient and family member at bedside    Discharge instructions/Information to patient and family:   See after visit summary for information provided to patient and family  Provisions for Follow-Up Care:  See after visit summary for information related to follow-up care and any pertinent home health orders  Disposition:     Home    For Discharges to [address] [facility]:   · Not Applicable to this Patient - Not Applicable to this Patient    Planned Readmission: no     Discharge Statement:  I spent 25 minutes discharging the patient   This time was spent on the day of discharge  I had direct contact with the patient on the day of discharge  Greater than 50% of the total time was spent examining patient, answering all patient questions, arranging and discussing plan of care with patient as well as directly providing post-discharge instructions  Additional time then spent on discharge activities  Discharge Medications:  See after visit summary for reconciled discharge medications provided to patient and family        ** Please Note: This note has been constructed using a voice recognition system **

## 2019-02-16 NOTE — PROGRESS NOTES
NEPHROLOGY PROGRESS NOTE   Ruby De Paz 34 y o  male MRN: [id number]  Unit/Bed#: [facility] 918-01 Encounter: [id number]  Reason for Consult: JAN    Assessment/Plan:  1  Acute nephritic syndrome, suspecting IgA nephropathy given clinical history, serologies are currently pending, hepatitis profile is negative, HIV negative, complements normal, neg LES, anti-double-stranded DNA pending, pending SPEP/UPEP, pending and GBM antibody, pending ANCA  2  Nephrotic range proteinuria, 4 g seen on 24 hour urine  3  Extensive family medical history for kidney disease as well as end-stage renal dise    PLAN:  · Status post renal biopsy, hemoglobin appears stable  · Start prednisone, 30 mg b i d   · Start Pepcid 20 mg daily  · Stable for discharge from Nephrology standpoint  · Repeat BMP in 1 week  · Will need close follow-up with Nephrology    SUBJECTIVE:   Seen examined  Patient with slight left flank pain  Denies any nausea vomiting diarrhea  Denies any chest pain shortness of breath  Otherwise feels well  Review of Systems    OBJECTIVE:  Current Weight: Weight - Scale: 67 9 kg (149 lb 11 1 oz)  Vitals:    02/15/19 1215 02/15/19 1425 02/15/19 2205 02/16/19 0553   BP: 109/65 139/78 124/68    BP Location:  Left arm     Pulse: 69 90 62    Resp: 17 14 14    Temp: 98 24 °F (36 8 °C) 98 6 °F (37 °C) 98 6 °F (37 °C)    TempSrc:  Oral     SpO2: 98% 93% 97%    Weight:    67 9 kg (149 lb 11 1 oz)   Height:           Intake/Output Summary (Last 24 hours) at 2/16/2019 0854  Last data filed at 2/16/2019 0441  Gross per 24 hour   Intake 840 ml   Output 1875 ml   Net -1035 ml       Physical Exam   Constitutional: He is oriented to person, place, and time  No distress  HENT:   Head: Normocephalic  Eyes: No scleral icterus  Neck: Neck supple  Cardiovascular: Normal rate and regular rhythm  Pulmonary/Chest: [place] normal and breath sounds normal    Abdominal: Soft  He exhibits no distension  Musculoskeletal: He exhibits no edema  Neurological: He is alert and oriented to person, place, and time  Skin: Skin is warm and dry         Medications:    Current Facility-Administered Medications:     acetaminophen (TYLENOL) tablet 650 mg, 650 mg, Oral, Q6H PRN, Yina Abel MD    aluminum-magnesium hydroxide-simethicone (MYLANTA) 200-200-20 mg/5 mL oral suspension 30 mL, 30 mL, Oral, Q6H PRN, Yina Abel MD    docusate sodium (COLACE) capsule 100 mg, 100 mg, Oral, BID, Yina Abel MD, 100 mg at 02/16/19 0754    influenza vaccine, quadrivalent (FLULAVAL) IM injection 0 5 mL, 0 5 mL, Intramuscular, Prior to discharge, Yina Abel MD    ondansetron [facility]) injection 4 mg, 4 mg, Intravenous, Q6H PRN, Yina Abel MD    predniSONE tablet 30 mg, 30 mg, Oral, BID With Meals, Abhilash Brooke DO, 30 mg at 02/16/19 0754    senna (SENOKOT) tablet 8 6 mg, 1 tablet, Oral, Daily, Yina Abel MD, 8 6 mg at 02/16/19 0754    Laboratory Results:  Results from last 7 days   Lab Units 02/16/19  0553 02/15/19  0457 02/14/19 2024   WBC Thousand/uL 8 73 8 97 9 50   HEMOGLOBIN g/dL 13 1 14 2 12 5   HEMATOCRIT % 38 5 41 0 36 3*   PLATELETS Thousands/uL 325 353 343   POTASSIUM mmol/L 4 2 4 2 3 6   CHLORIDE mmol/L 108 109* 109*   CO2 mmol/L 27 25 28   BUN mg/dL 26* 27* 29*   CREATININE mg/dL 1 60* 1 44* 1 43*   CALCIUM mg/dL 8 6 8 8 8 4   MAGNESIUM mg/dL  --  2 3  --    PHOSPHORUS mg/dL  --  3 5  --

## 2019-02-16 NOTE — PLAN OF CARE
Problem: PAIN - ADULT  Goal: Verbalizes/displays adequate comfort level or baseline comfort level  Description  Interventions:  - Encourage patient to monitor pain and request assistance  - Assess pain using appropriate pain scale  - Administer analgesics based on type and severity of pain and evaluate response  - Implement non-pharmacological measures as appropriate and evaluate response  - Consider cultural and social influences on pain and pain management  - Notify physician/advanced practitioner if interventions unsuccessful or patient reports new pain  Outcome: Progressing     Problem: INFECTION - ADULT  Goal: Absence or prevention of progression during hospitalization  Description  INTERVENTIONS:  - Assess and monitor for signs and symptoms of infection  - Monitor lab/diagnostic results  - Monitor all insertion sites, i e  indwelling lines, tubes, and drains  - Monitor endotracheal (as able) and nasal secretions for changes in amount and color  - [place] appropriate cooling/warming therapies per order  - Administer medications as ordered  - Instruct and encourage patient and family to use good hand hygiene technique  - Identify and instruct in appropriate isolation precautions for identified infection/condition  Outcome: Progressing  Goal: Absence of fever/infection during neutropenic period  Description  INTERVENTIONS:  - Monitor WBC     Outcome: Progressing     Problem: SAFETY ADULT  Goal: Patient will remain free of falls  Description  INTERVENTIONS:  - Assess patient frequently for physical needs     Outcome: Progressing  Goal: Maintain or return to baseline ADL function  Description  INTERVENTIONS:  -  Assess patient's ability to carry out ADLs; assess patient's baseline for ADL function and identify physical deficits which impact ability to perform ADLs (bathing, care of mouth/teeth, toileting, grooming, dressing, etc )  - Assess/evaluate cause of self-care deficits   - Assess range of motion  - Assess patient's mobility; develop plan if impaired  - Assess patient's need for assistive devices and provide as appropriate  - Encourage maximum independence but intervene and supervise when necessary  ¯ Involve family in performance of ADLs  ¯ Assess for home care needs following discharge   ¯ Request OT consult to assist with ADL evaluation and planning for discharge  ¯ Provide patient education as appropriate  Outcome: Progressing  Goal: Maintain or return mobility status to optimal level  Description  INTERVENTIONS:  - Assess patient's baseline mobility status (ambulation, transfers, stairs, etc )    - Identify cognitive and physical deficits and behaviors that affect mobility  - Identify mobility aids required to assist with transfers and/or ambulation (gait belt, sit-to-stand, lift, walker, cane, etc )  - [place] fall precautions as indicated by assessment  - Record patient progress and toleration of activity level on Mobility SBAR; progress patient to next Phase/Stage  - Instruct patient to call for assistance with activity based on assessment  - Request Rehabilitation consult to assist with strengthening/weightbearing, etc   Outcome: Progressing     Problem: DISCHARGE PLANNING  Goal: Discharge to home or other facility with appropriate resources  Description  INTERVENTIONS:  - Identify barriers to discharge w/patient and caregiver  - Arrange for needed discharge resources and transportation as appropriate  - Identify discharge learning needs (meds, wound care, etc )  - Arrange for interpretive services to assist at discharge as needed  - Refer to Case Management Department for coordinating discharge planning if the patient needs post-hospital services based on physician/advanced practitioner order or complex needs related to functional status, cognitive ability, or social support system  Outcome: Progressing     Problem: Knowledge Deficit  Goal: Patient/family/caregiver demonstrates understanding of disease process, treatment plan, medications, and discharge instructions  Description  Complete learning assessment and assess knowledge base    Interventions:  - Provide teaching at level of understanding  - Provide teaching via preferred learning methods  Outcome: Progressing     Problem: DISCHARGE PLANNING - CARE MANAGEMENT  Goal: Discharge to post-acute care or home with appropriate resources  Description  INTERVENTIONS:  - Conduct assessment to determine patient/family and health care team treatment goals, and need for post-acute services based on payer coverage, community resources, and patient preferences, and barriers to discharge  - Address psychosocial, clinical, and financial barriers to discharge as identified in assessment in conjunction with the patient/family and health care team  - Arrange appropriate level of post-acute services according to patient's   needs and preference and payer coverage in collaboration with the physician and health care team  - Communicate with and update the patient/family, physician, and health care team regarding progress on the discharge plan  - Arrange appropriate transportation to post-acute venues  - Discharge to home when medically cleared   Outcome: Progressing

## 2019-02-18 ENCOUNTER — TELEPHONE (OUTPATIENT)
Dept: NEPHROLOGY | Facility: CLINIC | Age: 34
End: 2019-02-18

## 2019-02-18 DIAGNOSIS — R03.0 ELEVATED BLOOD PRESSURE READING: ICD-10-CM

## 2019-02-18 DIAGNOSIS — R31.9 HEMATURIA, UNSPECIFIED TYPE: ICD-10-CM

## 2019-02-18 DIAGNOSIS — R80.9 PROTEINURIA, UNSPECIFIED TYPE: Primary | ICD-10-CM

## 2019-02-18 DIAGNOSIS — N01.9 RAPIDLY PROGRESSIVE GLOMERULONEPHRITIS: ICD-10-CM

## 2019-02-18 DIAGNOSIS — Z98.890 STATUS POST BIOPSY OF KIDNEY: ICD-10-CM

## 2019-02-18 LAB
C-ANCA TITR SER IF: NORMAL TITER
MYELOPEROXIDASE AB SER IA-ACNC: <9 U/ML (ref 0–9)
P-ANCA ATYPICAL TITR SER IF: NORMAL TITER
P-ANCA TITR SER IF: NORMAL TITER
PROTEINASE3 AB SER IA-ACNC: <3.5 U/ML (ref 0–3.5)

## 2019-02-18 NOTE — TELEPHONE ENCOUNTER
----- Message from Tyree Haddad DO sent at 2/16/2019  8:57 AM EST -----  Patient is to be discharged from [facility] today  He needs close follow-up with Dr Conner Noguera status post kidney biopsy  Repeat BMP in 1 week

## 2019-02-19 LAB
CRYOGLOB SER QL 1D COLD INC: POSITIVE
GBM AB SER IA-ACNC: 5 UNITS (ref 0–20)

## 2019-02-22 NOTE — PROGRESS NOTES
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

## 2019-02-26 ENCOUNTER — APPOINTMENT (OUTPATIENT)
Dept: LAB | Age: 34
End: 2019-02-26
Payer: COMMERCIAL

## 2019-02-26 DIAGNOSIS — R31.9 HEMATURIA, UNSPECIFIED TYPE: ICD-10-CM

## 2019-02-26 DIAGNOSIS — R03.0 ELEVATED BLOOD PRESSURE READING: ICD-10-CM

## 2019-02-26 DIAGNOSIS — N05.9 NEPHRITIC SYNDROME: ICD-10-CM

## 2019-02-26 DIAGNOSIS — N01.9 RAPIDLY PROGRESSIVE GLOMERULONEPHRITIS: ICD-10-CM

## 2019-02-26 DIAGNOSIS — R80.9 PROTEINURIA, UNSPECIFIED TYPE: ICD-10-CM

## 2019-02-26 DIAGNOSIS — Z98.890 STATUS POST BIOPSY OF KIDNEY: ICD-10-CM

## 2019-02-26 LAB
ANION GAP SERPL CALCULATED.3IONS-SCNC: 6 MMOL/L (ref 4–13)
BACTERIA UR QL AUTO: ABNORMAL /HPF
BASOPHILS # BLD AUTO: 0.06 THOUSANDS/ΜL (ref 0–0.1)
BASOPHILS NFR BLD AUTO: 0 % (ref 0–1)
BILIRUB UR QL STRIP: NEGATIVE
BUN SERPL-MCNC: 29 MG/DL (ref 5–25)
C3 SERPL-MCNC: 101 MG/DL (ref 90–180)
C4 SERPL-MCNC: 20 MG/DL (ref 10–40)
CALCIUM SERPL-MCNC: 8.7 MG/DL (ref 8.3–10.1)
CHLORIDE SERPL-SCNC: 107 MMOL/L (ref 100–108)
CK SERPL-CCNC: 58 U/L (ref 39–308)
CLARITY UR: CLEAR
CO2 SERPL-SCNC: 27 MMOL/L (ref 21–32)
COLOR UR: YELLOW
CREAT SERPL-MCNC: 1.4 MG/DL (ref 0.6–1.3)
CREAT UR-MCNC: 54.4 MG/DL
EOSINOPHIL # BLD AUTO: 0.09 THOUSAND/ΜL (ref 0–0.61)
EOSINOPHIL NFR BLD AUTO: 1 % (ref 0–6)
ERYTHROCYTE [DISTWIDTH] IN BLOOD BY AUTOMATED COUNT: 12.8 % (ref 11.6–15.1)
GFR SERPL CREATININE-BSD FRML MDRD: 66 ML/MIN/1.73SQ M
GLUCOSE P FAST SERPL-MCNC: 83 MG/DL (ref 65–99)
GLUCOSE UR STRIP-MCNC: NEGATIVE MG/DL
HCT VFR BLD AUTO: 41.7 % (ref 36.5–49.3)
HGB BLD-MCNC: 14.2 G/DL (ref 12–17)
HGB UR QL STRIP.AUTO: ABNORMAL
HYALINE CASTS #/AREA URNS LPF: ABNORMAL /LPF
IMM GRANULOCYTES # BLD AUTO: 0.28 THOUSAND/UL (ref 0–0.2)
IMM GRANULOCYTES NFR BLD AUTO: 2 % (ref 0–2)
KETONES UR STRIP-MCNC: NEGATIVE MG/DL
LEUKOCYTE ESTERASE UR QL STRIP: NEGATIVE
LYMPHOCYTES # BLD AUTO: 2.9 THOUSANDS/ΜL (ref 0.6–4.47)
LYMPHOCYTES NFR BLD AUTO: 18 % (ref 14–44)
MCH RBC QN AUTO: 32.3 PG (ref 26.8–34.3)
MCHC RBC AUTO-ENTMCNC: 34.1 G/DL (ref 31.4–37.4)
MCV RBC AUTO: 95 FL (ref 82–98)
MONOCYTES # BLD AUTO: 1.22 THOUSAND/ΜL (ref 0.17–1.22)
MONOCYTES NFR BLD AUTO: 8 % (ref 4–12)
NEUTROPHILS # BLD AUTO: 11.74 THOUSANDS/ΜL (ref 1.85–7.62)
NEUTS SEG NFR BLD AUTO: 71 % (ref 43–75)
NITRITE UR QL STRIP: NEGATIVE
NON-SQ EPI CELLS URNS QL MICRO: ABNORMAL /HPF
NRBC BLD AUTO-RTO: 0 /100 WBCS
PH UR STRIP.AUTO: 6 [PH] (ref 4.5–8)
PLATELET # BLD AUTO: 329 THOUSANDS/UL (ref 149–390)
PMV BLD AUTO: 12.3 FL (ref 8.9–12.7)
POTASSIUM SERPL-SCNC: 3.6 MMOL/L (ref 3.5–5.3)
PROT UR STRIP-MCNC: ABNORMAL MG/DL
PROT UR-MCNC: 245 MG/DL
PROT/CREAT UR: 4.5 MG/G{CREAT} (ref 0–0.1)
RBC # BLD AUTO: 4.39 MILLION/UL (ref 3.88–5.62)
RBC #/AREA URNS AUTO: ABNORMAL /HPF
SODIUM SERPL-SCNC: 140 MMOL/L (ref 136–145)
SP GR UR STRIP.AUTO: 1.01 (ref 1–1.03)
UROBILINOGEN UR QL STRIP.AUTO: 0.2 E.U./DL
WBC # BLD AUTO: 16.29 THOUSAND/UL (ref 4.31–10.16)
WBC #/AREA URNS AUTO: ABNORMAL /HPF

## 2019-02-26 PROCEDURE — 86038 ANTINUCLEAR ANTIBODIES: CPT | Performed by: INTERNAL MEDICINE

## 2019-02-26 PROCEDURE — 86160 COMPLEMENT ANTIGEN: CPT

## 2019-02-26 PROCEDURE — 84165 PROTEIN E-PHORESIS SERUM: CPT | Performed by: PATHOLOGY

## 2019-02-26 PROCEDURE — 85025 COMPLETE CBC W/AUTO DIFF WBC: CPT | Performed by: INTERNAL MEDICINE

## 2019-02-26 PROCEDURE — 84165 PROTEIN E-PHORESIS SERUM: CPT | Performed by: INTERNAL MEDICINE

## 2019-02-26 PROCEDURE — 84166 PROTEIN E-PHORESIS/URINE/CSF: CPT | Performed by: PATHOLOGY

## 2019-02-26 PROCEDURE — 36415 COLL VENOUS BLD VENIPUNCTURE: CPT | Performed by: INTERNAL MEDICINE

## 2019-02-26 PROCEDURE — 83520 IMMUNOASSAY QUANT NOS NONAB: CPT | Performed by: INTERNAL MEDICINE

## 2019-02-26 PROCEDURE — 86255 FLUORESCENT ANTIBODY SCREEN: CPT | Performed by: INTERNAL MEDICINE

## 2019-02-26 PROCEDURE — 82550 ASSAY OF CK (CPK): CPT | Performed by: INTERNAL MEDICINE

## 2019-02-26 PROCEDURE — 84166 PROTEIN E-PHORESIS/URINE/CSF: CPT | Performed by: INTERNAL MEDICINE

## 2019-02-26 PROCEDURE — 84156 ASSAY OF PROTEIN URINE: CPT | Performed by: INTERNAL MEDICINE

## 2019-02-26 PROCEDURE — 81001 URINALYSIS AUTO W/SCOPE: CPT | Performed by: INTERNAL MEDICINE

## 2019-02-26 PROCEDURE — 82570 ASSAY OF URINE CREATININE: CPT | Performed by: INTERNAL MEDICINE

## 2019-02-26 PROCEDURE — 86162 COMPLEMENT TOTAL (CH50): CPT

## 2019-02-26 PROCEDURE — 80048 BASIC METABOLIC PNL TOTAL CA: CPT

## 2019-02-26 PROCEDURE — 86225 DNA ANTIBODY NATIVE: CPT | Performed by: INTERNAL MEDICINE

## 2019-02-27 LAB
CH50 SERPL-ACNC: 46 U/ML
DSDNA AB SER-ACNC: 2 IU/ML (ref 0–9)
RYE IGE QN: NEGATIVE

## 2019-02-28 LAB
ALBUMIN SERPL ELPH-MCNC: 3.04 G/DL (ref 3.5–5)
ALBUMIN SERPL ELPH-MCNC: 55.2 % (ref 52–65)
ALPHA1 GLOB SERPL ELPH-MCNC: 0.29 G/DL (ref 0.1–0.4)
ALPHA1 GLOB SERPL ELPH-MCNC: 5.3 % (ref 2.5–5)
ALPHA2 GLOB SERPL ELPH-MCNC: 0.9 G/DL (ref 0.4–1.2)
ALPHA2 GLOB SERPL ELPH-MCNC: 16.4 % (ref 7–13)
BETA GLOB ABNORMAL SERPL ELPH-MCNC: 0.36 G/DL (ref 0.4–0.8)
BETA1 GLOB SERPL ELPH-MCNC: 6.5 % (ref 5–13)
BETA2 GLOB SERPL ELPH-MCNC: 5.9 % (ref 2–8)
BETA2+GAMMA GLOB SERPL ELPH-MCNC: 0.32 G/DL (ref 0.2–0.5)
C-ANCA TITR SER IF: NORMAL TITER
GAMMA GLOB ABNORMAL SERPL ELPH-MCNC: 0.59 G/DL (ref 0.5–1.6)
GAMMA GLOB SERPL ELPH-MCNC: 10.7 % (ref 12–22)
GBM AB SER IA-ACNC: 4 UNITS (ref 0–20)
IGG/ALB SER: 1.23 {RATIO} (ref 1.1–1.8)
MYELOPEROXIDASE AB SER IA-ACNC: <9 U/ML (ref 0–9)
P-ANCA ATYPICAL TITR SER IF: NORMAL TITER
P-ANCA TITR SER IF: NORMAL TITER
PROT PATTERN SERPL ELPH-IMP: ABNORMAL
PROT SERPL-MCNC: 5.5 G/DL (ref 6.4–8.2)
PROTEINASE3 AB SER IA-ACNC: <3.5 U/ML (ref 0–3.5)

## 2019-03-01 LAB
ALBUMIN UR ELPH-MCNC: 86.3 %
ALPHA1 GLOB MFR UR ELPH: 2.9 %
ALPHA2 GLOB MFR UR ELPH: 3 %
B-GLOBULIN MFR UR ELPH: 4.8 %
GAMMA GLOB MFR UR ELPH: 3 %
PROT PATTERN UR ELPH-IMP: ABNORMAL
PROT UR-MCNC: 248 MG/DL

## 2019-03-05 ENCOUNTER — TELEPHONE (OUTPATIENT)
Dept: NEPHROLOGY | Facility: CLINIC | Age: 34
End: 2019-03-05

## 2019-03-05 LAB — SCAN RESULT: NORMAL

## 2019-03-06 ENCOUNTER — OFFICE VISIT (OUTPATIENT)
Dept: NEPHROLOGY | Facility: CLINIC | Age: 34
End: 2019-03-06
Payer: COMMERCIAL

## 2019-03-06 VITALS
HEIGHT: 71 IN | WEIGHT: 161.2 LBS | HEART RATE: 75 BPM | BODY MASS INDEX: 22.57 KG/M2 | DIASTOLIC BLOOD PRESSURE: 60 MMHG | SYSTOLIC BLOOD PRESSURE: 115 MMHG

## 2019-03-06 DIAGNOSIS — R03.0 ELEVATED BLOOD PRESSURE READING: Primary | ICD-10-CM

## 2019-03-06 DIAGNOSIS — R80.9 PROTEINURIA, UNSPECIFIED TYPE: ICD-10-CM

## 2019-03-06 DIAGNOSIS — N02.9 IDIOPATHIC HEMATURIA, UNSPECIFIED WHETHER GLOMERULAR MORPHOLOGIC CHANGES PRESENT: ICD-10-CM

## 2019-03-06 DIAGNOSIS — N07.8 HEREDITARY NEPHRITIS: ICD-10-CM

## 2019-03-06 DIAGNOSIS — N02.8 IGA NEPHROPATHY: ICD-10-CM

## 2019-03-06 PROCEDURE — 99214 OFFICE O/P EST MOD 30 MIN: CPT | Performed by: INTERNAL MEDICINE

## 2019-03-06 RX ORDER — LOSARTAN POTASSIUM 25 MG/1
12.5 TABLET ORAL DAILY
Qty: 90 TABLET | Refills: 3 | Status: SHIPPED | OUTPATIENT
Start: 2019-03-06 | End: 2020-03-10

## 2019-03-06 NOTE — PATIENT INSTRUCTIONS
Elvira Reeves, your here today for follow-up regarding your kidney function  We discussed the diagnosis of hereditary nephritis  At this point we recommend the followin  Please start losartan 12 5 mg daily if he notice any lightheadedness, blurry vision or any other symptoms please let me know right away and stop the medicine  2  Repeat blood work in 2 weeks to make sure your potassium and kidney function are stable with a new medicine  3  If everything is okay will follow up with repeat comprehensive blood work in about 6 months we will call you with the result regardless  4  If everything is stable at that time we will follow up on a yearly basis in 2020  5  We will investigate further genetic testing and will be in touch with you regarding this as well  6   If there any other questions or concerns please do not hesitate to contact me

## 2019-03-06 NOTE — PROGRESS NOTES
OFFICE FOLLOW UP - Nephrology   Terrell Mcdaniels 34 y o  male MRN: [id number]    Encounter: [id number]        [address] [place] [address]    This is a 27-year-old male with no significant past medical history who presents for evaluation of hematuria and proteinuria diagnosed with biopsy-proven heriditary nephritis with minimal IgA nephropathy    1  Elevated blood pressure reading  -blood pressure were elevated at his last office visit and in the hospital  -blood pressures today are much better  -will continue to monitor with the addition of losartan which is being added for anti proteinuric affects    2  Proteinuria, unspecified type  -start low-dose losartan, this was chosen as his blood pressures are 80 less than 120/80 and 1 will avoid further hypotension  -will start 12 5 mg daily, repeat a BMP in about 1-2 weeks and follow up in 6 months with a repeat urine protein to creatinine ratio    3   Idiopathic hematuria, unspecified whether glomerular morphologic changes present, IgA nephropathy, Hereditary nephritis  -discussed results, prednisone was weaned down after biopsy  -serologic workup was negative  -biopsy results showed 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 with moderate glomerular basement membrane ending, focal lamellation and loss of staining of COL4A5 that was consistent with heriditary nephritis  -minimal IgA nephropathy  -tubular atrophy and interstitial fibrosis that was moderate  -mild arteriole and arteriolosclerosis, mild  -will plan on Ace inhibition  -semi annual blood work with yearly follow-up  -his estimated GFR is above 60 with a creatinine that is stable at 1 4, he has 2 uncles, grandmother wall had significant renal disease  -appreciate my colleague Dr Georges Singleton who does see his family members with help in regards to this patients findings-will investigate genetic testing for his family given the significant morbidity and mortality in his family history of kidney disease and for future family planning as he is currently engaged  He is accompanied today by his fiance and they agree with this plan  Overall he is doing well follow-up in 1 year    Patient Instructions   Riaz Melgar, your here today for follow-up regarding your kidney function  We discussed the diagnosis of hereditary nephritis  At this point we recommend the followin  Please start losartan 12 5 mg daily if he notice any lightheadedness, blurry vision or any other symptoms please let me know right away and stop the medicine  2  Repeat blood work in 2 weeks to make sure your potassium and kidney function are stable with a new medicine  3  If everything is okay will follow up with repeat comprehensive blood work in about 6 months we will call you with the result regardless  4  If everything is stable at that time we will follow up on a yearly basis in 2020  5  We will investigate further genetic testing and will be in touch with you regarding this as well  6  If there any other questions or concerns please do not hesitate to contact me         HPI: Kenzie Payne is a 34 y o  male who is here for Follow-up (Kidney Biopsy results)    Post biopsies been doing well his hemoglobin has remained stable he feels okay urine output has been stable he checks his blood pressures at home and they have been slightly higher in the 023 systolic range but after he stopped his prednisone this improved he also did have some acid reflux associated with the present prednisone use this was tapered rather quickly in this improved he has no other clinical complaints at this time no other fevers or chills nausea vomiting diarrhea or constipation    ROS:   All the systems were reviewed and were negative except as documented on the HPI  Allergies: Patient has no known allergies      Medications:   Current Outpatient Medications:     losartan (COZAAR) 25 mg tablet, Take 0 5 tablets (12 5 mg total) by mouth daily, Disp: 90 tablet, Rfl: 3    Past Medical History:   Diagnosis Date    Hematuria     Proteinuria      Past Surgical History:   Procedure Laterality Date    CLAVICLE SURGERY      CT NEEDLE BIOPSY KIDNEY  2/15/2019     Family History   Problem Relation Age of Onset    Breast cancer Mother     No Known Problems Father     Kidney disease Maternal Uncle     Kidney disease Maternal Grandfather       reports that he has never smoked  He has never used smokeless tobacco  He reports that he drinks alcohol  He reports that he does not use drugs  Physical Exam:   Vitals:    03/06/19 0858   BP: 115/60   BP Location: Left arm   Patient Position: Sitting   Cuff Size: Large   Pulse: 75   Weight: 73 1 kg (161 lb 3 2 oz)   Height: 5' 11" (1 803 m)     Body mass index is 22 48 kg/m²      General: conscious, cooperative, in not acute distress  Eyes: conjunctivae pink, anicteric sclerae  ENT: lips and mucous membranes moist  Neck: supple, no JVD  Chest: clear breath sounds bilateral, no crackles, ronchus or wheezings  CVS: distinct S1 & S2, normal rate, regular rhythm  Abdomen: soft, non-tender, non-distended, normoactive bowel sounds  Extremities: no edema of both legs  Skin: no rash  Neuro: awake, alert, oriented      Lab Results:  Results for orders placed or performed in visit on 02/26/19   Complement, total   Result Value Ref Range    Compl, Total (CH50) 46 >41 U/mL   Basic metabolic panel   Result Value Ref Range    Sodium 140 136 - 145 mmol/L    Potassium 3 6 3 5 - 5 3 mmol/L    Chloride 107 100 - 108 mmol/L    CO2 27 21 - 32 mmol/L    ANION GAP 6 4 - 13 mmol/L    BUN 29 (H) 5 - 25 mg/dL    Creatinine 1 40 (H) 0 60 - 1 30 mg/dL    Glucose, Fasting 83 65 - 99 mg/dL    Calcium 8 7 8 3 - 10 1 mg/dL    eGFR 66 ml/min/1 73sq m   C3 complement   Result Value Ref Range    C3 Complement 101 0 90 0 - 180 0 mg/dL   C4 complement   Result Value Ref Range    C4, COMPLEMENT 20 0 10 0 - 40 0 mg/dL       Portions of the record may have been created with voice recognition software  Occasional wrong word or "sound a like" substitutions may have occurred due to the inherent limitations of voice recognition software  Read the chart carefully and recognize, using context, where substitutions have occurred  If you have any questions, please contact the dictating provider

## 2019-03-27 ENCOUNTER — APPOINTMENT (OUTPATIENT)
Dept: LAB | Age: 34
End: 2019-03-27
Payer: COMMERCIAL

## 2019-03-27 DIAGNOSIS — N02.8 IGA NEPHROPATHY: ICD-10-CM

## 2019-03-27 DIAGNOSIS — R80.9 PROTEINURIA, UNSPECIFIED TYPE: ICD-10-CM

## 2019-03-27 DIAGNOSIS — R03.0 ELEVATED BLOOD PRESSURE READING: ICD-10-CM

## 2019-03-27 DIAGNOSIS — N02.9 IDIOPATHIC HEMATURIA, UNSPECIFIED WHETHER GLOMERULAR MORPHOLOGIC CHANGES PRESENT: ICD-10-CM

## 2019-03-27 DIAGNOSIS — N07.8 HEREDITARY NEPHRITIS: ICD-10-CM

## 2019-03-27 LAB
ALBUMIN SERPL BCP-MCNC: 3.3 G/DL (ref 3.5–5)
ALP SERPL-CCNC: 57 U/L (ref 46–116)
ALT SERPL W P-5'-P-CCNC: 23 U/L (ref 12–78)
ANION GAP SERPL CALCULATED.3IONS-SCNC: 4 MMOL/L (ref 4–13)
AST SERPL W P-5'-P-CCNC: 16 U/L (ref 5–45)
BACTERIA UR QL AUTO: ABNORMAL /HPF
BASOPHILS # BLD AUTO: 0.06 THOUSANDS/ΜL (ref 0–0.1)
BASOPHILS NFR BLD AUTO: 1 % (ref 0–1)
BILIRUB SERPL-MCNC: 1.51 MG/DL (ref 0.2–1)
BILIRUB UR QL STRIP: NEGATIVE
BUN SERPL-MCNC: 24 MG/DL (ref 5–25)
CALCIUM SERPL-MCNC: 8.6 MG/DL (ref 8.3–10.1)
CHLORIDE SERPL-SCNC: 108 MMOL/L (ref 100–108)
CLARITY UR: CLEAR
CO2 SERPL-SCNC: 26 MMOL/L (ref 21–32)
COLOR UR: YELLOW
CREAT SERPL-MCNC: 1.57 MG/DL (ref 0.6–1.3)
CREAT UR-MCNC: 86.8 MG/DL
EOSINOPHIL # BLD AUTO: 0.12 THOUSAND/ΜL (ref 0–0.61)
EOSINOPHIL NFR BLD AUTO: 2 % (ref 0–6)
ERYTHROCYTE [DISTWIDTH] IN BLOOD BY AUTOMATED COUNT: 11.9 % (ref 11.6–15.1)
GFR SERPL CREATININE-BSD FRML MDRD: 58 ML/MIN/1.73SQ M
GLUCOSE SERPL-MCNC: 93 MG/DL (ref 65–140)
GLUCOSE UR STRIP-MCNC: NEGATIVE MG/DL
HCT VFR BLD AUTO: 41.1 % (ref 36.5–49.3)
HGB BLD-MCNC: 14.5 G/DL (ref 12–17)
HGB UR QL STRIP.AUTO: ABNORMAL
HYALINE CASTS #/AREA URNS LPF: ABNORMAL /LPF
IMM GRANULOCYTES # BLD AUTO: 0.02 THOUSAND/UL (ref 0–0.2)
IMM GRANULOCYTES NFR BLD AUTO: 0 % (ref 0–2)
KETONES UR STRIP-MCNC: NEGATIVE MG/DL
LEUKOCYTE ESTERASE UR QL STRIP: NEGATIVE
LYMPHOCYTES # BLD AUTO: 2.52 THOUSANDS/ΜL (ref 0.6–4.47)
LYMPHOCYTES NFR BLD AUTO: 35 % (ref 14–44)
MAGNESIUM SERPL-MCNC: 2.2 MG/DL (ref 1.6–2.6)
MCH RBC QN AUTO: 33.2 PG (ref 26.8–34.3)
MCHC RBC AUTO-ENTMCNC: 35.3 G/DL (ref 31.4–37.4)
MCV RBC AUTO: 94 FL (ref 82–98)
MONOCYTES # BLD AUTO: 0.51 THOUSAND/ΜL (ref 0.17–1.22)
MONOCYTES NFR BLD AUTO: 7 % (ref 4–12)
NEUTROPHILS # BLD AUTO: 4 THOUSANDS/ΜL (ref 1.85–7.62)
NEUTS SEG NFR BLD AUTO: 55 % (ref 43–75)
NITRITE UR QL STRIP: NEGATIVE
NON-SQ EPI CELLS URNS QL MICRO: ABNORMAL /HPF
NRBC BLD AUTO-RTO: 0 /100 WBCS
PH UR STRIP.AUTO: 6 [PH]
PHOSPHATE SERPL-MCNC: 2.5 MG/DL (ref 2.7–4.5)
PLATELET # BLD AUTO: 486 THOUSANDS/UL (ref 149–390)
PMV BLD AUTO: 11.6 FL (ref 8.9–12.7)
POTASSIUM SERPL-SCNC: 4.1 MMOL/L (ref 3.5–5.3)
PROT SERPL-MCNC: 6.2 G/DL (ref 6.4–8.2)
PROT UR STRIP-MCNC: ABNORMAL MG/DL
PROT UR-MCNC: 222 MG/DL
PROT/CREAT UR: 2.56 MG/G{CREAT} (ref 0–0.1)
PTH-INTACT SERPL-MCNC: 31.1 PG/ML (ref 18.4–80.1)
RBC # BLD AUTO: 4.37 MILLION/UL (ref 3.88–5.62)
RBC #/AREA URNS AUTO: ABNORMAL /HPF
SODIUM SERPL-SCNC: 138 MMOL/L (ref 136–145)
SP GR UR STRIP.AUTO: 1.01 (ref 1–1.03)
UROBILINOGEN UR QL STRIP.AUTO: 0.2 E.U./DL
WBC # BLD AUTO: 7.23 THOUSAND/UL (ref 4.31–10.16)
WBC #/AREA URNS AUTO: ABNORMAL /HPF

## 2019-03-27 PROCEDURE — 81001 URINALYSIS AUTO W/SCOPE: CPT | Performed by: INTERNAL MEDICINE

## 2019-03-27 PROCEDURE — 80053 COMPREHEN METABOLIC PANEL: CPT | Performed by: INTERNAL MEDICINE

## 2019-03-27 PROCEDURE — 84100 ASSAY OF PHOSPHORUS: CPT | Performed by: INTERNAL MEDICINE

## 2019-03-27 PROCEDURE — 85025 COMPLETE CBC W/AUTO DIFF WBC: CPT

## 2019-03-27 PROCEDURE — 83970 ASSAY OF PARATHORMONE: CPT | Performed by: INTERNAL MEDICINE

## 2019-03-27 PROCEDURE — 82570 ASSAY OF URINE CREATININE: CPT | Performed by: INTERNAL MEDICINE

## 2019-03-27 PROCEDURE — 84156 ASSAY OF PROTEIN URINE: CPT | Performed by: INTERNAL MEDICINE

## 2019-03-27 PROCEDURE — 83735 ASSAY OF MAGNESIUM: CPT | Performed by: INTERNAL MEDICINE

## 2019-03-28 ENCOUNTER — TELEPHONE (OUTPATIENT)
Dept: OTHER | Facility: HOSPITAL | Age: 34
End: 2019-03-28

## 2019-03-28 DIAGNOSIS — N07.8 HEREDITARY NEPHRITIS: Primary | ICD-10-CM

## 2019-03-28 NOTE — TELEPHONE ENCOUNTER
I spoke with Jose Caceres and made him aware that his scr is stable and his UPC has improved as well  He has been instructed to continue on the losartan and to continue with proper hydration  Pt denies having any complaints, all repeat labs have been ordered and mailed to pt home address  Jose Caceres did verbalize understanding and has no questions or concerns at this time

## 2019-03-28 NOTE — TELEPHONE ENCOUNTER
Please let him know that his labs were reviewed, is creatinine is stable, he can continue the losartan the urine protein to creatinine ratio has improved as well    Continue to stay hydrated and if everything is stable we can follow-up with repeat blood work that includes a CBC, BMP, magnesium, phosphorus, PTH, vitamin-D, urinalysis, urine protein to creatinine ratio in 4-6 months

## 2020-03-06 ENCOUNTER — APPOINTMENT (OUTPATIENT)
Dept: LAB | Age: 35
End: 2020-03-06
Payer: COMMERCIAL

## 2020-03-06 ENCOUNTER — TELEPHONE (OUTPATIENT)
Dept: NEPHROLOGY | Facility: CLINIC | Age: 35
End: 2020-03-06

## 2020-03-06 DIAGNOSIS — R80.9 PROTEINURIA, UNSPECIFIED TYPE: Primary | ICD-10-CM

## 2020-03-06 DIAGNOSIS — N02.9 IDIOPATHIC HEMATURIA, UNSPECIFIED WHETHER GLOMERULAR MORPHOLOGIC CHANGES PRESENT: ICD-10-CM

## 2020-03-06 DIAGNOSIS — N07.8 HEREDITARY NEPHRITIS: ICD-10-CM

## 2020-03-06 DIAGNOSIS — N01.9 RAPIDLY PROGRESSIVE GLOMERULONEPHRITIS: ICD-10-CM

## 2020-03-06 DIAGNOSIS — N02.8 IGA NEPHROPATHY: ICD-10-CM

## 2020-03-06 DIAGNOSIS — R80.9 PROTEINURIA, UNSPECIFIED TYPE: ICD-10-CM

## 2020-03-06 LAB
ALBUMIN SERPL BCP-MCNC: 3.3 G/DL (ref 3.5–5)
ALP SERPL-CCNC: 59 U/L (ref 46–116)
ALT SERPL W P-5'-P-CCNC: 25 U/L (ref 12–78)
ANION GAP SERPL CALCULATED.3IONS-SCNC: 6 MMOL/L (ref 4–13)
AST SERPL W P-5'-P-CCNC: 19 U/L (ref 5–45)
BACTERIA UR QL AUTO: ABNORMAL /HPF
BILIRUB SERPL-MCNC: 1.17 MG/DL (ref 0.2–1)
BILIRUB UR QL STRIP: NEGATIVE
BUN SERPL-MCNC: 35 MG/DL (ref 5–25)
CALCIUM SERPL-MCNC: 9.2 MG/DL (ref 8.3–10.1)
CHLORIDE SERPL-SCNC: 109 MMOL/L (ref 100–108)
CLARITY UR: CLEAR
CO2 SERPL-SCNC: 28 MMOL/L (ref 21–32)
COLOR UR: YELLOW
CREAT SERPL-MCNC: 1.72 MG/DL (ref 0.6–1.3)
CREAT UR-MCNC: 52.2 MG/DL
GFR SERPL CREATININE-BSD FRML MDRD: 51 ML/MIN/1.73SQ M
GLUCOSE SERPL-MCNC: 88 MG/DL (ref 65–140)
GLUCOSE UR STRIP-MCNC: NEGATIVE MG/DL
HGB UR QL STRIP.AUTO: ABNORMAL
HYALINE CASTS #/AREA URNS LPF: ABNORMAL /LPF
KETONES UR STRIP-MCNC: NEGATIVE MG/DL
LEUKOCYTE ESTERASE UR QL STRIP: NEGATIVE
MAGNESIUM SERPL-MCNC: 2.4 MG/DL (ref 1.6–2.6)
NITRITE UR QL STRIP: NEGATIVE
NON-SQ EPI CELLS URNS QL MICRO: ABNORMAL /HPF
PH UR STRIP.AUTO: 6 [PH]
PHOSPHATE SERPL-MCNC: 3.3 MG/DL (ref 2.7–4.5)
POTASSIUM SERPL-SCNC: 4.4 MMOL/L (ref 3.5–5.3)
PROT SERPL-MCNC: 6.1 G/DL (ref 6.4–8.2)
PROT UR STRIP-MCNC: ABNORMAL MG/DL
PROT UR-MCNC: 158 MG/DL
PROT/CREAT UR: 3.03 MG/G{CREAT} (ref 0–0.1)
PTH-INTACT SERPL-MCNC: 23.3 PG/ML (ref 18.4–80.1)
RBC #/AREA URNS AUTO: ABNORMAL /HPF
SODIUM SERPL-SCNC: 143 MMOL/L (ref 136–145)
SP GR UR STRIP.AUTO: 1.01 (ref 1–1.03)
UROBILINOGEN UR QL STRIP.AUTO: 0.2 E.U./DL
WBC #/AREA URNS AUTO: ABNORMAL /HPF

## 2020-03-06 PROCEDURE — 84100 ASSAY OF PHOSPHORUS: CPT

## 2020-03-06 PROCEDURE — 84156 ASSAY OF PROTEIN URINE: CPT

## 2020-03-06 PROCEDURE — 80053 COMPREHEN METABOLIC PANEL: CPT

## 2020-03-06 PROCEDURE — 82570 ASSAY OF URINE CREATININE: CPT

## 2020-03-06 PROCEDURE — 83970 ASSAY OF PARATHORMONE: CPT

## 2020-03-06 PROCEDURE — 81001 URINALYSIS AUTO W/SCOPE: CPT

## 2020-03-06 PROCEDURE — 83735 ASSAY OF MAGNESIUM: CPT

## 2020-03-06 PROCEDURE — 36415 COLL VENOUS BLD VENIPUNCTURE: CPT

## 2020-03-06 NOTE — TELEPHONE ENCOUNTER
I called and spoke with Odalys Monday to remind him on blood work that needs to get done prior to next week's appointment  Odalys Monday stated that he will get that taken care of today after work

## 2020-03-10 ENCOUNTER — OFFICE VISIT (OUTPATIENT)
Dept: NEPHROLOGY | Facility: CLINIC | Age: 35
End: 2020-03-10
Payer: COMMERCIAL

## 2020-03-10 VITALS
SYSTOLIC BLOOD PRESSURE: 132 MMHG | WEIGHT: 156 LBS | HEIGHT: 71 IN | BODY MASS INDEX: 21.84 KG/M2 | HEART RATE: 81 BPM | RESPIRATION RATE: 18 BRPM | DIASTOLIC BLOOD PRESSURE: 72 MMHG

## 2020-03-10 DIAGNOSIS — N07.8 HEREDITARY NEPHRITIS: ICD-10-CM

## 2020-03-10 DIAGNOSIS — R03.0 ELEVATED BLOOD PRESSURE READING: ICD-10-CM

## 2020-03-10 DIAGNOSIS — N02.9 IDIOPATHIC HEMATURIA, UNSPECIFIED WHETHER GLOMERULAR MORPHOLOGIC CHANGES PRESENT: ICD-10-CM

## 2020-03-10 DIAGNOSIS — N02.8 IGA NEPHROPATHY: ICD-10-CM

## 2020-03-10 DIAGNOSIS — R80.9 PROTEINURIA, UNSPECIFIED TYPE: ICD-10-CM

## 2020-03-10 PROCEDURE — 99214 OFFICE O/P EST MOD 30 MIN: CPT | Performed by: INTERNAL MEDICINE

## 2020-03-10 RX ORDER — LOSARTAN POTASSIUM 25 MG/1
25 TABLET ORAL DAILY
Qty: 90 TABLET | Refills: 3 | Status: SHIPPED | OUTPATIENT
Start: 2020-03-10 | End: 2020-10-08

## 2020-03-10 NOTE — PROGRESS NOTES
OFFICE FOLLOW UP - Nephrology   Cristian Lorenzo 30 y o  male MRN: [id number]    Encounter: [id number]        [address] [place] [address]    This is a 29-year-old male with no significant past medical history who presents for evaluation of hematuria and proteinuria diagnosed with biopsy-proven heriditary nephritis with minimal IgA nephropathy    1  Elevated blood pressure reading  -blood pressure were elevated at his last office visit and in the hospital  -blood pressures today are still in the 525 systolic range will increase his losartan to 25 mg daily continue to up titrate for a goal blood pressure of 120/80    2  Proteinuria, unspecified type  -still with roughly 3 g of proteinuria continue ARB for anti proteinuric effect    3   Idiopathic hematuria, unspecified whether glomerular morphologic changes present, IgA nephropathy, Hereditary nephritis  -discussed results, prednisone was weaned down after biopsy  -serologic workup was negative  -biopsy results showed 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 with moderate glomerular basement membrane ending, focal lamellation and loss of staining of COL4A5 that was consistent with heriditary nephritis  -minimal IgA nephropathy  -tubular atrophy and interstitial fibrosis that was moderate  -mild arteriole and arteriolosclerosis, mild  -on ARB  -continue follow-up every 6 months  -his estimated GFR is now around 50 mL per minute  -will look into genetic testing advised done genetic counseling is getting  this year and does want to family plan    Continue blood pressure control and follow-up in 6 months    HPI: Cristian Lorenzo is a 30 y o  male who is here for Follow-up and Hypertension    He continues to work at a [employer] course, no acute problem no edema no chest pain no foamy or bloody urine tolerating his losartan well, he has a significant family history an uncle who has Alport's and is on dialysis as well as a onto who is on dialysis they have been talking about the kidney disease as well his grandmother had kidney disease as well, he denies any acute chest pain or shortness of breath no fevers or chills no nausea vomiting diarrhea constipation positive foamy urine no bloody urine    ROS:   All the systems were reviewed and were negative except as documented on the HPI  Allergies: Patient has no known allergies  Medications:   Current Outpatient Medications:     losartan (COZAAR) 25 mg tablet, Take 1 tablet (25 mg total) by mouth daily, Disp: 90 tablet, Rfl: 3    Past Medical History:   Diagnosis Date    Hematuria     Proteinuria      Past Surgical History:   Procedure Laterality Date    CLAVICLE SURGERY      CT NEEDLE BIOPSY KIDNEY  2/15/2019     Family History   Problem Relation Age of Onset    Breast cancer Mother     No Known Problems Father     Kidney disease Maternal Uncle     Kidney disease Maternal Grandfather       reports that he has never smoked  He has never used smokeless tobacco  He reports that he drinks alcohol  He reports that he does not use drugs  Physical Exam:   Vitals:    03/10/20 1324   BP: 132/72   BP Location: Right arm   Patient Position: Sitting   Cuff Size: Standard   Pulse: 81   Resp: 18   Weight: 70 8 kg (156 lb)   Height: 5' 11" (1 803 m)     Body mass index is 21 76 kg/m²      General: conscious, cooperative, in not acute distress  Eyes: conjunctivae pink, anicteric sclerae  ENT: lips and mucous membranes moist  Neck: supple, no JVD  Chest: clear breath sounds bilateral, no crackles, ronchus or wheezings  CVS: distinct S1 & S2, normal rate, regular rhythm  Abdomen: soft, non-tender, non-distended, normoactive bowel sounds  Extremities: no edema of both legs  Skin: no rash  Neuro: awake, alert, oriented      Lab Results:  Results for orders placed or performed in visit on 03/06/20   Comprehensive metabolic panel   Result Value Ref Range    Sodium 143 136 - 145 mmol/L    Potassium 4 4 3 5 - 5 3 mmol/L    Chloride 109 (H) 100 - 108 mmol/L    CO2 28 21 - 32 mmol/L    ANION GAP 6 4 - 13 mmol/L    BUN 35 (H) 5 - 25 mg/dL    Creatinine 1 72 (H) 0 60 - 1 30 mg/dL    Glucose 88 65 - 140 mg/dL    Calcium 9 2 8 3 - 10 1 mg/dL    AST 19 5 - 45 U/L    ALT 25 12 - 78 U/L    Alkaline Phosphatase 59 46 - 116 U/L    Total Protein 6 1 (L) 6 4 - 8 2 g/dL    Albumin 3 3 (L) 3 5 - 5 0 g/dL    Total Bilirubin 1 17 (H) 0 20 - 1 00 mg/dL    eGFR 51 ml/min/1 73sq m   Magnesium   Result Value Ref Range    Magnesium 2 4 1 6 - 2 6 mg/dL   Phosphorus   Result Value Ref Range    Phosphorus 3 3 2 7 - 4 5 mg/dL   Protein / creatinine ratio, urine   Result Value Ref Range    Creatinine, Ur 52 2 mg/dL    Protein Urine Random 158 mg/dL    Prot/Creat Ratio, Ur 3 03 (H) 0 00 - 0 10   Urinalysis with microscopic   Result Value Ref Range    Clarity, UA Clear     Color, UA Yellow     Specific [place], UA 1 010 1 003 - 1 030    pH, UA 6 0 4 5, 5 0, 5 5, 6 0, 6 5, 7 0, 7 5, 8 0    Glucose, UA Negative Negative mg/dl    Ketones, UA Negative Negative mg/dl    Blood, UA Large (A) Negative    Protein,  (3+) (A) Negative mg/dl    Nitrite, UA Negative Negative    Bilirubin, UA Negative Negative    Urobilinogen, UA 0 2 0 2, 1 0 E U /dl E U /dl    Leukocytes, UA Negative Negative    WBC, UA None Seen None Seen, 0-5, 5-55, 5-65 /hpf    RBC, UA 20-30 (A) None Seen, 0-5 /hpf    Hyaline Casts, UA None Seen None Seen /lpf    Bacteria, UA None Seen None Seen, Occasional /hpf    Epithelial Cells None Seen None Seen, Occasional /hpf   PTH, intact   Result Value Ref Range    PTH 23 3 18 4 - 80 1 pg/mL       Portions of the record may have been created with voice recognition software  Occasional wrong word or "sound a like" substitutions may have occurred due to the inherent limitations of voice recognition software  Read the chart carefully and recognize, using context, where substitutions have occurred  If you have any questions, please contact the dictating provider

## 2020-03-10 NOTE — PATIENT INSTRUCTIONS
Chronic Kidney Disease   WHAT YOU NEED TO KNOW:   Chronic kidney disease (CKD) is the gradual and permanent loss of kidney function  It is also called chronic kidney failure, or chronic renal insufficiency  Normally, the kidneys remove fluid, chemicals, and waste from your blood  These wastes are turned into urine by your kidneys  CKD may worsen over time and lead to kidney failure  DISCHARGE INSTRUCTIONS:   Return to the emergency department if:   · You are confused and very drowsy  · You have a seizure  · You have shortness of breath  Contact your healthcare provider if:   · You suddenly gain or lose more weight than your healthcare provider has told you is okay  · You have itchy skin or a rash  · You urinate more or less than you normally do  · You have blood in your urine  · You have nausea and repeated vomiting  · You have fatigue or muscle weakness  · You have hiccups that will not stop  · You have questions or concerns about your condition or care  Medicines:   · Medicines  may be given to decrease blood pressure and get rid of extra fluid  You may also receive medicine to manage health conditions that may occur with CKD, such as anemia, diabetes, and heart disease  · Take your medicine as directed  Contact your healthcare provider if you think your medicine is not helping or if you have side effects  Tell him or her if you are allergic to any medicine  Keep a list of the medicines, vitamins, and herbs you take  Include the amounts, and when and why you take them  Bring the list or the pill bottles to follow-up visits  Carry your medicine list with you in case of an emergency  Follow up with your healthcare provider as directed: You will need to return for tests to monitor your kidney function  You may also be referred to a kidney specialist  Write down your questions so you remember to ask them during your visits  Manage other health conditions:   Follow your healthcare provider's directions on how to manage diabetes, high blood pressure, and heart disease  These conditions can make CKD worse  Talk to your healthcare provider before you take over-the-counter medicine  Medicines such as NSAIDs, stomach medicine, or laxatives may harm your kidneys  Weigh yourself daily:  Ask your healthcare provider what your weight should be  Ask how much liquid you should drink each day  CKD may cause you to gain or lose weight rapidly  Weigh yourself every day  Write down your weight, how much liquid you drink or eat, and how much you urinate each day  Contact your healthcare provider if your weight is higher or lower than it should be  Manage CKD:   · Maintain a healthy weight  Ask your healthcare provider how much you should weigh  Ask him to help you create a weight loss plan if you are overweight  · Exercise 30 to 60 minutes a day, 4 to 7 times a week, or as directed  Ask about the best exercise plan for you  Regular exercise can help you manage CKD, high blood pressure, and diabetes  · Follow your healthcare provider's advice about what to eat and drink  He may tell you to eat food low in sodium (salt), potassium, phosphorus, or protein  You may need to see a dietitian if you need help planning meals  Ask how much liquid to drink each day and which liquids are best for you  · Limit alcohol  Ask how much alcohol is safe for you to drink  A drink of alcohol is 12 ounces of beer, 5 ounces of wine, or 1½ ounces of liquor  · Do not smoke  Nicotine and other chemicals in cigarettes and cigars can cause lung and kidney damage  Ask your healthcare provider for information if you currently smoke and need help to quit  E-cigarettes or smokeless tobacco still contain nicotine  Talk to your healthcare provider before you use these products  · Ask your healthcare provider if you need vaccines    Infections such as pneumonia, influenza, and hepatitis can be more harmful or more likely to occur in a person who has CKD  Vaccines reduce your risk of infection with these viruses  © 2017 [employer] Information is for End User's use only and may not be sold, redistributed or otherwise used for commercial purposes  All illustrations and images included in CareNotes® are the copyrighted property of A D [employer] M [employer]  or [employer]  The above information is an  only  It is not intended as medical advice for individual conditions or treatments  Talk to your doctor, nurse or pharmacist before following any medical regimen to see if it is safe and effective for you

## 2020-10-05 ENCOUNTER — APPOINTMENT (OUTPATIENT)
Dept: LAB | Age: 35
End: 2020-10-05
Payer: COMMERCIAL

## 2020-10-05 DIAGNOSIS — R03.0 ELEVATED BLOOD PRESSURE READING: ICD-10-CM

## 2020-10-05 DIAGNOSIS — N07.8 HEREDITARY NEPHRITIS: ICD-10-CM

## 2020-10-05 DIAGNOSIS — N02.8 IGA NEPHROPATHY: ICD-10-CM

## 2020-10-05 DIAGNOSIS — N02.9 IDIOPATHIC HEMATURIA, UNSPECIFIED WHETHER GLOMERULAR MORPHOLOGIC CHANGES PRESENT: ICD-10-CM

## 2020-10-05 DIAGNOSIS — R80.9 PROTEINURIA, UNSPECIFIED TYPE: ICD-10-CM

## 2020-10-05 LAB
ALBUMIN SERPL BCP-MCNC: 3.3 G/DL (ref 3.5–5)
ANION GAP SERPL CALCULATED.3IONS-SCNC: 1 MMOL/L (ref 4–13)
BACTERIA UR QL AUTO: ABNORMAL /HPF
BASOPHILS # BLD AUTO: 0.07 THOUSANDS/ΜL (ref 0–0.1)
BASOPHILS NFR BLD AUTO: 1 % (ref 0–1)
BILIRUB UR QL STRIP: NEGATIVE
BUN SERPL-MCNC: 29 MG/DL (ref 5–25)
CALCIUM ALBUM COR SERPL-MCNC: 9.9 MG/DL (ref 8.3–10.1)
CALCIUM SERPL-MCNC: 9.3 MG/DL (ref 8.3–10.1)
CHLORIDE SERPL-SCNC: 111 MMOL/L (ref 100–108)
CLARITY UR: CLEAR
CO2 SERPL-SCNC: 30 MMOL/L (ref 21–32)
COLOR UR: YELLOW
CREAT SERPL-MCNC: 1.88 MG/DL (ref 0.6–1.3)
CREAT UR-MCNC: 85.7 MG/DL
EOSINOPHIL # BLD AUTO: 0.18 THOUSAND/ΜL (ref 0–0.61)
EOSINOPHIL NFR BLD AUTO: 3 % (ref 0–6)
ERYTHROCYTE [DISTWIDTH] IN BLOOD BY AUTOMATED COUNT: 11.7 % (ref 11.6–15.1)
GFR SERPL CREATININE-BSD FRML MDRD: 46 ML/MIN/1.73SQ M
GLUCOSE P FAST SERPL-MCNC: 92 MG/DL (ref 65–99)
GLUCOSE UR STRIP-MCNC: NEGATIVE MG/DL
HCT VFR BLD AUTO: 41.3 % (ref 36.5–49.3)
HGB BLD-MCNC: 13.9 G/DL (ref 12–17)
HGB UR QL STRIP.AUTO: ABNORMAL
HYALINE CASTS #/AREA URNS LPF: ABNORMAL /LPF
IMM GRANULOCYTES # BLD AUTO: 0.01 THOUSAND/UL (ref 0–0.2)
IMM GRANULOCYTES NFR BLD AUTO: 0 % (ref 0–2)
KETONES UR STRIP-MCNC: NEGATIVE MG/DL
LEUKOCYTE ESTERASE UR QL STRIP: NEGATIVE
LYMPHOCYTES # BLD AUTO: 1.77 THOUSANDS/ΜL (ref 0.6–4.47)
LYMPHOCYTES NFR BLD AUTO: 26 % (ref 14–44)
MAGNESIUM SERPL-MCNC: 2.4 MG/DL (ref 1.6–2.6)
MCH RBC QN AUTO: 32.2 PG (ref 26.8–34.3)
MCHC RBC AUTO-ENTMCNC: 33.7 G/DL (ref 31.4–37.4)
MCV RBC AUTO: 96 FL (ref 82–98)
MONOCYTES # BLD AUTO: 0.48 THOUSAND/ΜL (ref 0.17–1.22)
MONOCYTES NFR BLD AUTO: 7 % (ref 4–12)
NEUTROPHILS # BLD AUTO: 4.29 THOUSANDS/ΜL (ref 1.85–7.62)
NEUTS SEG NFR BLD AUTO: 63 % (ref 43–75)
NITRITE UR QL STRIP: NEGATIVE
NON-SQ EPI CELLS URNS QL MICRO: ABNORMAL /HPF
NRBC BLD AUTO-RTO: 0 /100 WBCS
PH UR STRIP.AUTO: 6 [PH]
PHOSPHATE SERPL-MCNC: 2.5 MG/DL (ref 2.7–4.5)
PLATELET # BLD AUTO: 369 THOUSANDS/UL (ref 149–390)
PMV BLD AUTO: 11.5 FL (ref 8.9–12.7)
POTASSIUM SERPL-SCNC: 4.8 MMOL/L (ref 3.5–5.3)
PROT UR STRIP-MCNC: ABNORMAL MG/DL
PROT UR-MCNC: 332 MG/DL
PROT/CREAT UR: 3.87 MG/G{CREAT} (ref 0–0.1)
PTH-INTACT SERPL-MCNC: 44.4 PG/ML (ref 18.4–80.1)
RBC # BLD AUTO: 4.32 MILLION/UL (ref 3.88–5.62)
RBC #/AREA URNS AUTO: ABNORMAL /HPF
SODIUM SERPL-SCNC: 142 MMOL/L (ref 136–145)
SP GR UR STRIP.AUTO: 1.01 (ref 1–1.03)
URATE SERPL-MCNC: 7.2 MG/DL (ref 4.2–8)
UROBILINOGEN UR QL STRIP.AUTO: 0.2 E.U./DL
WBC # BLD AUTO: 6.8 THOUSAND/UL (ref 4.31–10.16)
WBC #/AREA URNS AUTO: ABNORMAL /HPF

## 2020-10-05 PROCEDURE — 83735 ASSAY OF MAGNESIUM: CPT

## 2020-10-05 PROCEDURE — 85025 COMPLETE CBC W/AUTO DIFF WBC: CPT

## 2020-10-05 PROCEDURE — 82570 ASSAY OF URINE CREATININE: CPT | Performed by: INTERNAL MEDICINE

## 2020-10-05 PROCEDURE — 36415 COLL VENOUS BLD VENIPUNCTURE: CPT

## 2020-10-05 PROCEDURE — 84550 ASSAY OF BLOOD/URIC ACID: CPT

## 2020-10-05 PROCEDURE — 81001 URINALYSIS AUTO W/SCOPE: CPT | Performed by: INTERNAL MEDICINE

## 2020-10-05 PROCEDURE — 83970 ASSAY OF PARATHORMONE: CPT

## 2020-10-05 PROCEDURE — 84156 ASSAY OF PROTEIN URINE: CPT | Performed by: INTERNAL MEDICINE

## 2020-10-05 PROCEDURE — 80069 RENAL FUNCTION PANEL: CPT

## 2020-10-08 ENCOUNTER — OFFICE VISIT (OUTPATIENT)
Dept: NEPHROLOGY | Facility: CLINIC | Age: 35
End: 2020-10-08
Payer: COMMERCIAL

## 2020-10-08 VITALS
RESPIRATION RATE: 18 BRPM | HEIGHT: 71 IN | DIASTOLIC BLOOD PRESSURE: 70 MMHG | SYSTOLIC BLOOD PRESSURE: 128 MMHG | TEMPERATURE: 98.4 F | WEIGHT: 152 LBS | HEART RATE: 51 BPM | BODY MASS INDEX: 21.28 KG/M2

## 2020-10-08 DIAGNOSIS — R80.9 PROTEINURIA, UNSPECIFIED TYPE: ICD-10-CM

## 2020-10-08 DIAGNOSIS — R03.0 ELEVATED BLOOD PRESSURE READING: ICD-10-CM

## 2020-10-08 DIAGNOSIS — N07.8 HEREDITARY NEPHRITIS: ICD-10-CM

## 2020-10-08 DIAGNOSIS — N02.9 IDIOPATHIC HEMATURIA, UNSPECIFIED WHETHER GLOMERULAR MORPHOLOGIC CHANGES PRESENT: ICD-10-CM

## 2020-10-08 DIAGNOSIS — N02.8 IGA NEPHROPATHY: ICD-10-CM

## 2020-10-08 PROCEDURE — 99214 OFFICE O/P EST MOD 30 MIN: CPT | Performed by: INTERNAL MEDICINE

## 2020-10-08 RX ORDER — LOSARTAN POTASSIUM 25 MG/1
12.5 TABLET ORAL DAILY
Qty: 45 TABLET | Refills: 3 | Status: SHIPPED | OUTPATIENT
Start: 2020-10-08 | End: 2021-10-25

## 2021-03-18 ENCOUNTER — TELEPHONE (OUTPATIENT)
Dept: NEPHROLOGY | Facility: CLINIC | Age: 36
End: 2021-03-18

## 2021-03-18 NOTE — TELEPHONE ENCOUNTER
Left a message to schedule pts follow up with Dr Curt Nava    They can be scheduled with Nena Payne or Nati Taylor

## 2021-04-20 ENCOUNTER — TELEPHONE (OUTPATIENT)
Dept: NEPHROLOGY | Facility: CLINIC | Age: 36
End: 2021-04-20

## 2021-04-20 NOTE — TELEPHONE ENCOUNTER
A message has been left reminding pt of f/u appointment with Aaron Burden as well as lab work that is needed for this appointment

## 2021-04-21 ENCOUNTER — APPOINTMENT (OUTPATIENT)
Dept: LAB | Age: 36
End: 2021-04-21
Payer: COMMERCIAL

## 2021-04-21 DIAGNOSIS — R80.9 PROTEINURIA, UNSPECIFIED TYPE: ICD-10-CM

## 2021-04-21 DIAGNOSIS — N02.9 IDIOPATHIC HEMATURIA, UNSPECIFIED WHETHER GLOMERULAR MORPHOLOGIC CHANGES PRESENT: ICD-10-CM

## 2021-04-21 DIAGNOSIS — R03.0 ELEVATED BLOOD PRESSURE READING: ICD-10-CM

## 2021-04-21 DIAGNOSIS — N02.8 IGA NEPHROPATHY: ICD-10-CM

## 2021-04-21 DIAGNOSIS — N07.8 HEREDITARY NEPHRITIS: ICD-10-CM

## 2021-04-21 LAB
ALBUMIN SERPL BCP-MCNC: 3.4 G/DL (ref 3.5–5)
ALP SERPL-CCNC: 65 U/L (ref 46–116)
ALT SERPL W P-5'-P-CCNC: 34 U/L (ref 12–78)
ANION GAP SERPL CALCULATED.3IONS-SCNC: 6 MMOL/L (ref 4–13)
AST SERPL W P-5'-P-CCNC: 18 U/L (ref 5–45)
BACTERIA UR QL AUTO: ABNORMAL /HPF
BASOPHILS # BLD AUTO: 0.06 THOUSANDS/ΜL (ref 0–0.1)
BASOPHILS NFR BLD AUTO: 1 % (ref 0–1)
BILIRUB SERPL-MCNC: 0.95 MG/DL (ref 0.2–1)
BILIRUB UR QL STRIP: NEGATIVE
BUN SERPL-MCNC: 41 MG/DL (ref 5–25)
CALCIUM ALBUM COR SERPL-MCNC: 9.6 MG/DL (ref 8.3–10.1)
CALCIUM SERPL-MCNC: 9.1 MG/DL (ref 8.3–10.1)
CHLORIDE SERPL-SCNC: 113 MMOL/L (ref 100–108)
CLARITY UR: CLEAR
CO2 SERPL-SCNC: 22 MMOL/L (ref 21–32)
COLOR UR: YELLOW
CREAT SERPL-MCNC: 2.13 MG/DL (ref 0.6–1.3)
CREAT UR-MCNC: 63 MG/DL
EOSINOPHIL # BLD AUTO: 0.37 THOUSAND/ΜL (ref 0–0.61)
EOSINOPHIL NFR BLD AUTO: 5 % (ref 0–6)
ERYTHROCYTE [DISTWIDTH] IN BLOOD BY AUTOMATED COUNT: 11.9 % (ref 11.6–15.1)
GFR SERPL CREATININE-BSD FRML MDRD: 39 ML/MIN/1.73SQ M
GLUCOSE P FAST SERPL-MCNC: 97 MG/DL (ref 65–99)
GLUCOSE UR STRIP-MCNC: NEGATIVE MG/DL
HCT VFR BLD AUTO: 37.8 % (ref 36.5–49.3)
HGB BLD-MCNC: 13 G/DL (ref 12–17)
HGB UR QL STRIP.AUTO: ABNORMAL
HYALINE CASTS #/AREA URNS LPF: ABNORMAL /LPF
IMM GRANULOCYTES # BLD AUTO: 0.01 THOUSAND/UL (ref 0–0.2)
IMM GRANULOCYTES NFR BLD AUTO: 0 % (ref 0–2)
KETONES UR STRIP-MCNC: NEGATIVE MG/DL
LEUKOCYTE ESTERASE UR QL STRIP: NEGATIVE
LYMPHOCYTES # BLD AUTO: 2.31 THOUSANDS/ΜL (ref 0.6–4.47)
LYMPHOCYTES NFR BLD AUTO: 28 % (ref 14–44)
MAGNESIUM SERPL-MCNC: 2.6 MG/DL (ref 1.6–2.6)
MCH RBC QN AUTO: 32.6 PG (ref 26.8–34.3)
MCHC RBC AUTO-ENTMCNC: 34.4 G/DL (ref 31.4–37.4)
MCV RBC AUTO: 95 FL (ref 82–98)
MONOCYTES # BLD AUTO: 0.55 THOUSAND/ΜL (ref 0.17–1.22)
MONOCYTES NFR BLD AUTO: 7 % (ref 4–12)
NEUTROPHILS # BLD AUTO: 4.91 THOUSANDS/ΜL (ref 1.85–7.62)
NEUTS SEG NFR BLD AUTO: 59 % (ref 43–75)
NITRITE UR QL STRIP: NEGATIVE
NON-SQ EPI CELLS URNS QL MICRO: ABNORMAL /HPF
NRBC BLD AUTO-RTO: 0 /100 WBCS
PH UR STRIP.AUTO: 6 [PH]
PHOSPHATE SERPL-MCNC: 3.6 MG/DL (ref 2.7–4.5)
PLATELET # BLD AUTO: 359 THOUSANDS/UL (ref 149–390)
PMV BLD AUTO: 11 FL (ref 8.9–12.7)
POTASSIUM SERPL-SCNC: 3.9 MMOL/L (ref 3.5–5.3)
PROT SERPL-MCNC: 6.4 G/DL (ref 6.4–8.2)
PROT UR STRIP-MCNC: ABNORMAL MG/DL
PROT UR-MCNC: 230 MG/DL
PROT/CREAT UR: 3.65 MG/G{CREAT} (ref 0–0.1)
PTH-INTACT SERPL-MCNC: 38.7 PG/ML (ref 18.4–80.1)
RBC # BLD AUTO: 3.99 MILLION/UL (ref 3.88–5.62)
RBC #/AREA URNS AUTO: ABNORMAL /HPF
SODIUM SERPL-SCNC: 141 MMOL/L (ref 136–145)
SP GR UR STRIP.AUTO: 1.01 (ref 1–1.03)
UROBILINOGEN UR QL STRIP.AUTO: 0.2 E.U./DL
WBC # BLD AUTO: 8.21 THOUSAND/UL (ref 4.31–10.16)
WBC #/AREA URNS AUTO: ABNORMAL /HPF

## 2021-04-21 PROCEDURE — 80053 COMPREHEN METABOLIC PANEL: CPT

## 2021-04-21 PROCEDURE — 81001 URINALYSIS AUTO W/SCOPE: CPT | Performed by: INTERNAL MEDICINE

## 2021-04-21 PROCEDURE — 83970 ASSAY OF PARATHORMONE: CPT

## 2021-04-21 PROCEDURE — 82570 ASSAY OF URINE CREATININE: CPT | Performed by: INTERNAL MEDICINE

## 2021-04-21 PROCEDURE — 84156 ASSAY OF PROTEIN URINE: CPT | Performed by: INTERNAL MEDICINE

## 2021-04-21 PROCEDURE — 83735 ASSAY OF MAGNESIUM: CPT

## 2021-04-21 PROCEDURE — 84100 ASSAY OF PHOSPHORUS: CPT

## 2021-04-21 PROCEDURE — 85025 COMPLETE CBC W/AUTO DIFF WBC: CPT

## 2021-04-21 PROCEDURE — 36415 COLL VENOUS BLD VENIPUNCTURE: CPT

## 2021-04-23 ENCOUNTER — OFFICE VISIT (OUTPATIENT)
Dept: NEPHROLOGY | Facility: CLINIC | Age: 36
End: 2021-04-23
Payer: COMMERCIAL

## 2021-04-23 VITALS
BODY MASS INDEX: 22.51 KG/M2 | SYSTOLIC BLOOD PRESSURE: 110 MMHG | HEART RATE: 60 BPM | WEIGHT: 160.8 LBS | HEIGHT: 71 IN | DIASTOLIC BLOOD PRESSURE: 60 MMHG

## 2021-04-23 DIAGNOSIS — N07.8 HEREDITARY NEPHRITIS: Primary | ICD-10-CM

## 2021-04-23 DIAGNOSIS — N02.9 IDIOPATHIC HEMATURIA, UNSPECIFIED WHETHER GLOMERULAR MORPHOLOGIC CHANGES PRESENT: ICD-10-CM

## 2021-04-23 DIAGNOSIS — N02.8 IGA NEPHROPATHY: ICD-10-CM

## 2021-04-23 DIAGNOSIS — N18.32 STAGE 3B CHRONIC KIDNEY DISEASE (HCC): ICD-10-CM

## 2021-04-23 DIAGNOSIS — R80.9 PROTEINURIA, UNSPECIFIED TYPE: ICD-10-CM

## 2021-04-23 DIAGNOSIS — R03.0 ELEVATED BLOOD PRESSURE READING: ICD-10-CM

## 2021-04-23 PROBLEM — N18.31 STAGE 3A CHRONIC KIDNEY DISEASE (HCC): Status: ACTIVE | Noted: 2021-04-23

## 2021-04-23 PROCEDURE — 99214 OFFICE O/P EST MOD 30 MIN: CPT | Performed by: NURSE PRACTITIONER

## 2021-04-23 NOTE — PROGRESS NOTES
OFFICE FOLLOW UP - Nephrology   Jordana Code 37 y o  male MRN: [id number]       ASSESSMENT and PLAN:  Leonidas Smith was seen today for follow-up and chronic kidney disease  Diagnoses and all orders for this visit:    Hereditary nephritis  -     Ambulatory referral to ckd education program; Future    IgA nephropathy  -     Ambulatory referral to ckd education program; Future    Idiopathic hematuria, unspecified whether glomerular morphologic changes present    Proteinuria, unspecified type    Elevated blood pressure reading    Stage 3b chronic kidney disease ([address] )  -     Ambulatory referral to ckd education program; Future  -     Basic metabolic panel; Future  -     CBC and differential; Future  -     Magnesium; Future  -     Phosphorus; Future  -     PTH, intact; Future  -     Protein / creatinine ratio, urine; Future        Chronic kidney disease IIIA/B:  Assessment and Plan:   Etiology:  Suspect secondary to hereditary nephritis   Status post renal biopsy:  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 with moderate glomerular basement membrane ending, focal lamellation and loss of staining of COL4A5 that was consistent with heriditary nephritis  · minimal IgA nephropathy,  tubular atrophy and interstitial fibrosis that was moderate, mild arteriole and arteriolosclerosis, mild   After review medical records through [facility] it appears previous baseline creatinine 1 6-1 8 dating back to 2019   Most recent creatinine 2 13 with EGFR 39   Concern for progression versus initiation of losartan last office visit   UPCR remains elevated at  3 65 (3 87)    Will refer to Kidney Smart class for Chronic Kidney Disease education   Of note, he has 2 uncle and a grandmother wall had significant renal disease   Will need genetic counseling before pregnancy   Concern with some mild uremic symptoms to include periodic a m   Vomiting of bile, and reported ammonia taste periodic-patient reports this does not happen all the time but over the last month approximately 2 times   Discussed uremic symptoms to include nausea vomiting, decreased appetite, increased fatigue, confusion, metal taste   Will have patient return in three months with updated blood work and urine studies with Dr Hahn    Idiopathic hematuria, unspecified whether glomerular morphologic changes present, IgA nephropathy, Hereditary nephritis  Assessment and plan:  · Please see above    Elevated blood pressure without the diagnosis of Hypertension:  Assessment and plan:   Currently on losartan 25 mg for proteinuria   Will decreased to 12 5 mg in the setting of lower blood pressure readings of 110/60   Patient reported lightheaded dizziness with initiation of losartan and self decreased to 12 5 mg through the fall then increased back up to 25 mg over the winter   Current blood pressure 110/60   Encourage low-sodium diet   Hydration at least 64 oz of water per day    Proteinuria:  Assessment and plan:  · Continues with elevated U PCR 3 65 (3 87)  · Will decrease to 12 5 mg of losartan in the setting of lower blood pressure readings  · Will continue to monitor    H&H:  Assessment and plan:   Patient is not anemic   Current hemoglobin 13 0   Will continue to monitor in the setting of Chronic Kidney Disease    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:  Assessment and plan:   Recent PTH 38 7   Recent phosphorus 3 6   Will continue monitor PTH and phosphorus with updated blood work in the setting of Chronic Kidney Disease    Electrolytes/acid base:  Assessment and plan:   Within normal limits   Will continue to monitor    Volume status:  Assessment and plan:    Patient examining fairly euvolemic however has dry mucous membranes   Encourage hydration      Patient Instructions    All questions asked and answered  The patient has been instructed to call office at [phone] with any questions or concerns      Please obtain prescribed blood work and urine studies prior to next appointment  Avoid NSAID products which include: Motrin, Advil, Ibuprofen, Aleve, Naprosyn, Naproxen, Mobic or Celebrex   Low-sodium diet no more than 2000 mg per day  Hydrate at least 64 oz water at day  Losartan 12 5 mg daily  Return in 3 months with updated blood work and urine studies in the setting of chronic kidney disease  Kidney smart class referred  Call if worsening symptoms of uremia-vomiting/nausea/confusion/metal taste/sleepy            HPI: Debbie Davidson is a 37 y o  male who is here for follow-up for blood pressure and proteinuria  Patient was last seen on 10/08/2020 with Dr Rich Cool and losartan 25 mg was started  After review medical records through [facility] baseline creatinine appears to be 1 6-1 8 dating back to 2019  Creatinine was 1 88 with EGFR 46 at last appointment  Most recent blood work on 04/21/2021 has been reviewed reveals increased creatinine 2 13 with EGFR 39, with electrolytes and acid-base balance within normal limits  Recent UPCR 3 65 (3 87)  Medication list reviewed  On discussion, patient reports intermittent a m  Vomiting of bile and ammonia taste at least 2 times over the last two weeks  Denies fatigue, decreased appetite, nausea, mental status changes  Patient works full-time  Currently denies chest pain, shortness of breath, dizziness, lightheadedness, nausea, vomiting, urinary issues, fevers, chills or lower extremity edema  He did report after starting losartan he felt lightheaded and decreased to 12 5 mg through the fall months then increased back to 25 mg  ROS:   All the systems were reviewed and were negative except as documented on the HPI  Allergies: Patient has no known allergies      Medications:   Current Outpatient Medications:     losartan (COZAAR) 25 mg tablet, Take 0 5 tablets (12 5 mg total) by mouth daily, Disp: 45 tablet, Rfl: 3    Past Medical History:   Diagnosis Date    Hematuria     Proteinuria      Past Surgical History:   Procedure Laterality Date    CLAVICLE SURGERY      CT NEEDLE BIOPSY KIDNEY  2/15/2019     Family History   Problem Relation Age of Onset    Breast cancer Mother     No Known Problems Father     Kidney disease Maternal Uncle     Kidney disease Maternal Grandfather       reports that he has never smoked  He has never used smokeless tobacco  He reports current alcohol use  He reports that he does not use drugs  Physical Exam:   Vitals:    04/23/21 1504   BP: 110/60   BP Location: Left arm   Patient Position: Sitting   Cuff Size: Large   Pulse: 60   Weight: 72 9 kg (160 lb 12 8 oz)   Height: 5' 11" (1 803 m)     Body mass index is 22 43 kg/m²      General: cooperative, in not acute distress  Eyes: conjunctivae pink, anicteric sclerae  ENT: lips and mucous membranes fairly dry  Neck: supple, no JVD  Chest: clear breath sounds bilateral, no crackles, ronchus or wheezings  CVS: distinct S1 & S2, normal rate, regular rhythm  Abdomen: soft, non-tender, non-distended, normoactive bowel sounds  Back: no CVA tenderness  Extremities: no edema of both legs  Skin: no rash  Neuro: awake, alert, oriented      Lab Results:  Results for orders placed or performed in visit on 04/21/21   CBC and differential   Result Value Ref Range    WBC 8 21 4 31 - 10 16 Thousand/uL    RBC 3 99 3 88 - 5 62 Million/uL    Hemoglobin 13 0 12 0 - 17 0 g/dL    Hematocrit 37 8 36 5 - 49 3 %    MCV 95 82 - 98 fL    MCH 32 6 26 8 - 34 3 pg    MCHC 34 4 31 4 - 37 4 g/dL    RDW 11 9 11 6 - 15 1 %    MPV 11 0 8 9 - 12 7 fL    Platelets 436 069 - 524 Thousands/uL    nRBC 0 /100 WBCs    Neutrophils Relative 59 43 - 75 %    Immat GRANS % 0 0 - 2 %    Lymphocytes Relative 28 14 - 44 %    Monocytes Relative 7 4 - 12 %    Eosinophils Relative 5 0 - 6 %    Basophils Relative 1 0 - 1 %    Neutrophils Absolute 4 91 1 85 - 7 62 Thousands/µL    Immature Grans Absolute 0 01 0 00 - 0 20 Thousand/uL    Lymphocytes Absolute 2 31 0 60 - 4 47 Thousands/µL Monocytes Absolute 0 55 0 17 - 1 22 Thousand/µL    Eosinophils Absolute 0 37 0 00 - 0 61 Thousand/µL    Basophils Absolute 0 06 0 00 - 0 10 Thousands/µL   Comprehensive metabolic panel   Result Value Ref Range    Sodium 141 136 - 145 mmol/L    Potassium 3 9 3 5 - 5 3 mmol/L    Chloride 113 (H) 100 - 108 mmol/L    CO2 22 21 - 32 mmol/L    ANION GAP 6 4 - 13 mmol/L    BUN 41 (H) 5 - 25 mg/dL    Creatinine 2 13 (H) 0 60 - 1 30 mg/dL    Glucose, Fasting 97 65 - 99 mg/dL    Calcium 9 1 8 3 - 10 1 mg/dL    Corrected Calcium 9 6 8 3 - 10 1 mg/dL    AST 18 5 - 45 U/L    ALT 34 12 - 78 U/L    Alkaline Phosphatase 65 46 - 116 U/L    Total Protein 6 4 6 4 - 8 2 g/dL    Albumin 3 4 (L) 3 5 - 5 0 g/dL    Total Bilirubin 0 95 0 20 - 1 00 mg/dL    eGFR 39 ml/min/1 73sq m   Magnesium   Result Value Ref Range    Magnesium 2 6 1 6 - 2 6 mg/dL   Phosphorus   Result Value Ref Range    Phosphorus 3 6 2 7 - 4 5 mg/dL   PTH, intact   Result Value Ref Range    PTH 38 7 18 4 - 80 1 pg/mL       Results from last 7 days   Lab Units 04/21/21  0705   WBC Thousand/uL 8 21   HEMOGLOBIN g/dL 13 0   HEMATOCRIT % 37 8   PLATELETS Thousands/uL 359   SODIUM mmol/L 141   POTASSIUM mmol/L 3 9   CHLORIDE mmol/L 113*   CO2 mmol/L 22   BUN mg/dL 41*   CREATININE mg/dL 2 13*   CALCIUM mg/dL 9 1   MAGNESIUM mg/dL 2 6   PHOSPHORUS mg/dL 3 6           Portions of the record may have been created with voice recognition software  Occasional wrong word or "sound a like" substitutions may have occurred due to the inherent limitations of voice recognition software   Read the chart carefully and recognize,

## 2021-04-23 NOTE — PATIENT INSTRUCTIONS
 All questions asked and answered  The patient has been instructed to call office at [phone] with any questions or concerns  Please obtain prescribed blood work and urine studies prior to next appointment  Avoid NSAID products which include:  Motrin, Advil, Ibuprofen, Aleve, Naprosyn, Naproxen, Mobic or Celebrex   Low-sodium diet no more than 2000 mg per day  Hydrate at least 64 oz water at day  Losartan 12 5 mg daily  Return in 3 months with updated blood work and urine studies in the setting of chronic kidney disease  Kidney smart class referred  Call if worsening symptoms of uremia-vomiting/nausea/confusion/metal taste/sleepy

## 2021-05-25 ENCOUNTER — TELEPHONE (OUTPATIENT)
Dept: NEPHROLOGY | Facility: CLINIC | Age: 36
End: 2021-05-25

## 2021-05-26 NOTE — TELEPHONE ENCOUNTER
Left another message asking pt to call the office to reschedule his appointment on 7/15  This is the 2nd attempt a letter will be mailed out as well

## 2021-09-07 ENCOUNTER — TELEPHONE (OUTPATIENT)
Dept: NEPHROLOGY | Facility: CLINIC | Age: 36
End: 2021-09-07

## 2021-09-07 NOTE — TELEPHONE ENCOUNTER
I spoke to the patient to remind him of his appointment on 9/9/21 with Dr Frankie Horn and patient stated he needs to reschedule, he is sick   He rescheduled the appointment and was reminded of labs to be done

## 2021-10-22 DIAGNOSIS — R80.9 PROTEINURIA, UNSPECIFIED TYPE: ICD-10-CM

## 2021-10-22 DIAGNOSIS — N02.8 IGA NEPHROPATHY: ICD-10-CM

## 2021-10-22 DIAGNOSIS — R03.0 ELEVATED BLOOD PRESSURE READING: ICD-10-CM

## 2021-10-22 DIAGNOSIS — N02.9 IDIOPATHIC HEMATURIA, UNSPECIFIED WHETHER GLOMERULAR MORPHOLOGIC CHANGES PRESENT: ICD-10-CM

## 2021-10-22 DIAGNOSIS — N07.8 HEREDITARY NEPHRITIS: ICD-10-CM

## 2021-10-25 RX ORDER — LOSARTAN POTASSIUM 25 MG/1
TABLET ORAL
Qty: 45 TABLET | Refills: 3 | Status: SHIPPED | OUTPATIENT
Start: 2021-10-25 | End: 2021-11-16

## 2021-11-10 ENCOUNTER — TELEPHONE (OUTPATIENT)
Dept: NEPHROLOGY | Facility: HOSPITAL | Age: 36
End: 2021-11-10

## 2021-11-11 ENCOUNTER — APPOINTMENT (OUTPATIENT)
Dept: LAB | Age: 36
End: 2021-11-11
Payer: COMMERCIAL

## 2021-11-11 DIAGNOSIS — N18.32 STAGE 3B CHRONIC KIDNEY DISEASE (HCC): ICD-10-CM

## 2021-11-11 LAB
ANION GAP SERPL CALCULATED.3IONS-SCNC: 9 MMOL/L (ref 4–13)
BASOPHILS # BLD AUTO: 0.09 THOUSANDS/ΜL (ref 0–0.1)
BASOPHILS NFR BLD AUTO: 1 % (ref 0–1)
BUN SERPL-MCNC: 42 MG/DL (ref 5–25)
CALCIUM SERPL-MCNC: 9 MG/DL (ref 8.3–10.1)
CHLORIDE SERPL-SCNC: 110 MMOL/L (ref 100–108)
CO2 SERPL-SCNC: 25 MMOL/L (ref 21–32)
CREAT SERPL-MCNC: 2.45 MG/DL (ref 0.6–1.3)
CREAT UR-MCNC: 58.4 MG/DL
EOSINOPHIL # BLD AUTO: 0.22 THOUSAND/ΜL (ref 0–0.61)
EOSINOPHIL NFR BLD AUTO: 3 % (ref 0–6)
ERYTHROCYTE [DISTWIDTH] IN BLOOD BY AUTOMATED COUNT: 11.9 % (ref 11.6–15.1)
GFR SERPL CREATININE-BSD FRML MDRD: 33 ML/MIN/1.73SQ M
GLUCOSE P FAST SERPL-MCNC: 100 MG/DL (ref 65–99)
HCT VFR BLD AUTO: 41.3 % (ref 36.5–49.3)
HGB BLD-MCNC: 13.8 G/DL (ref 12–17)
IMM GRANULOCYTES # BLD AUTO: 0.02 THOUSAND/UL (ref 0–0.2)
IMM GRANULOCYTES NFR BLD AUTO: 0 % (ref 0–2)
LYMPHOCYTES # BLD AUTO: 2.1 THOUSANDS/ΜL (ref 0.6–4.47)
LYMPHOCYTES NFR BLD AUTO: 28 % (ref 14–44)
MAGNESIUM SERPL-MCNC: 2.8 MG/DL (ref 1.6–2.6)
MCH RBC QN AUTO: 32.6 PG (ref 26.8–34.3)
MCHC RBC AUTO-ENTMCNC: 33.4 G/DL (ref 31.4–37.4)
MCV RBC AUTO: 98 FL (ref 82–98)
MONOCYTES # BLD AUTO: 0.43 THOUSAND/ΜL (ref 0.17–1.22)
MONOCYTES NFR BLD AUTO: 6 % (ref 4–12)
NEUTROPHILS # BLD AUTO: 4.7 THOUSANDS/ΜL (ref 1.85–7.62)
NEUTS SEG NFR BLD AUTO: 62 % (ref 43–75)
NRBC BLD AUTO-RTO: 0 /100 WBCS
PHOSPHATE SERPL-MCNC: 3.7 MG/DL (ref 2.7–4.5)
PLATELET # BLD AUTO: 391 THOUSANDS/UL (ref 149–390)
PMV BLD AUTO: 12.5 FL (ref 8.9–12.7)
POTASSIUM SERPL-SCNC: 5 MMOL/L (ref 3.5–5.3)
PROT UR-MCNC: 258 MG/DL
PROT/CREAT UR: 4.42 MG/G{CREAT} (ref 0–0.1)
PTH-INTACT SERPL-MCNC: 34.5 PG/ML (ref 18.4–80.1)
RBC # BLD AUTO: 4.23 MILLION/UL (ref 3.88–5.62)
SODIUM SERPL-SCNC: 144 MMOL/L (ref 136–145)
WBC # BLD AUTO: 7.56 THOUSAND/UL (ref 4.31–10.16)

## 2021-11-11 PROCEDURE — 84100 ASSAY OF PHOSPHORUS: CPT

## 2021-11-11 PROCEDURE — 36415 COLL VENOUS BLD VENIPUNCTURE: CPT

## 2021-11-11 PROCEDURE — 84156 ASSAY OF PROTEIN URINE: CPT

## 2021-11-11 PROCEDURE — 85025 COMPLETE CBC W/AUTO DIFF WBC: CPT

## 2021-11-11 PROCEDURE — 83970 ASSAY OF PARATHORMONE: CPT

## 2021-11-11 PROCEDURE — 80048 BASIC METABOLIC PNL TOTAL CA: CPT

## 2021-11-11 PROCEDURE — 82570 ASSAY OF URINE CREATININE: CPT

## 2021-11-11 PROCEDURE — 83735 ASSAY OF MAGNESIUM: CPT

## 2021-11-12 ENCOUNTER — TELEPHONE (OUTPATIENT)
Dept: NEPHROLOGY | Facility: HOSPITAL | Age: 36
End: 2021-11-12

## 2021-11-12 DIAGNOSIS — N18.31 STAGE 3A CHRONIC KIDNEY DISEASE (HCC): Primary | ICD-10-CM

## 2021-11-15 ENCOUNTER — APPOINTMENT (OUTPATIENT)
Dept: LAB | Age: 36
End: 2021-11-15
Payer: COMMERCIAL

## 2021-11-15 DIAGNOSIS — N18.31 STAGE 3A CHRONIC KIDNEY DISEASE (HCC): ICD-10-CM

## 2021-11-15 LAB
ANION GAP SERPL CALCULATED.3IONS-SCNC: 5 MMOL/L (ref 4–13)
BUN SERPL-MCNC: 41 MG/DL (ref 5–25)
CALCIUM SERPL-MCNC: 9.1 MG/DL (ref 8.3–10.1)
CHLORIDE SERPL-SCNC: 111 MMOL/L (ref 100–108)
CO2 SERPL-SCNC: 22 MMOL/L (ref 21–32)
CREAT SERPL-MCNC: 2.3 MG/DL (ref 0.6–1.3)
GFR SERPL CREATININE-BSD FRML MDRD: 36 ML/MIN/1.73SQ M
GLUCOSE P FAST SERPL-MCNC: 96 MG/DL (ref 65–99)
POTASSIUM SERPL-SCNC: 4.7 MMOL/L (ref 3.5–5.3)
SODIUM SERPL-SCNC: 138 MMOL/L (ref 136–145)

## 2021-11-15 PROCEDURE — 36415 COLL VENOUS BLD VENIPUNCTURE: CPT

## 2021-11-15 PROCEDURE — 80048 BASIC METABOLIC PNL TOTAL CA: CPT

## 2021-11-16 ENCOUNTER — OFFICE VISIT (OUTPATIENT)
Dept: NEPHROLOGY | Facility: CLINIC | Age: 36
End: 2021-11-16
Payer: COMMERCIAL

## 2021-11-16 VITALS
HEIGHT: 71 IN | WEIGHT: 157 LBS | HEART RATE: 58 BPM | BODY MASS INDEX: 21.98 KG/M2 | SYSTOLIC BLOOD PRESSURE: 165 MMHG | DIASTOLIC BLOOD PRESSURE: 56 MMHG

## 2021-11-16 DIAGNOSIS — N18.32 STAGE 3B CHRONIC KIDNEY DISEASE (HCC): Primary | ICD-10-CM

## 2021-11-16 PROCEDURE — 99214 OFFICE O/P EST MOD 30 MIN: CPT | Performed by: INTERNAL MEDICINE

## 2021-11-16 RX ORDER — LISINOPRIL 5 MG/1
5 TABLET ORAL DAILY
Qty: 90 TABLET | Refills: 3 | Status: SHIPPED | OUTPATIENT
Start: 2021-11-16

## 2022-02-14 ENCOUNTER — TELEPHONE (OUTPATIENT)
Dept: NEPHROLOGY | Facility: HOSPITAL | Age: 37
End: 2022-02-14

## 2022-02-14 NOTE — TELEPHONE ENCOUNTER
Called patient and left a voicemail stating to please have labs drawn before the follow up appointment

## 2022-02-18 NOTE — TELEPHONE ENCOUNTER
Called and spoke with patient to have labs drawn before the follow up appointment  Patient stated he would have the labs drawn before the appointment

## 2022-02-21 ENCOUNTER — APPOINTMENT (OUTPATIENT)
Dept: LAB | Age: 37
End: 2022-02-21
Payer: COMMERCIAL

## 2022-02-21 LAB
ALBUMIN SERPL BCP-MCNC: 3.7 G/DL (ref 3.5–5)
ANION GAP SERPL CALCULATED.3IONS-SCNC: 6 MMOL/L (ref 4–13)
BUN SERPL-MCNC: 60 MG/DL (ref 5–25)
CALCIUM SERPL-MCNC: 9.7 MG/DL (ref 8.3–10.1)
CHLORIDE SERPL-SCNC: 112 MMOL/L (ref 100–108)
CO2 SERPL-SCNC: 19 MMOL/L (ref 21–32)
CREAT SERPL-MCNC: 2.63 MG/DL (ref 0.6–1.3)
ERYTHROCYTE [DISTWIDTH] IN BLOOD BY AUTOMATED COUNT: 11.9 % (ref 11.6–15.1)
GFR SERPL CREATININE-BSD FRML MDRD: 30 ML/MIN/1.73SQ M
GLUCOSE P FAST SERPL-MCNC: 101 MG/DL (ref 65–99)
HCT VFR BLD AUTO: 35.1 % (ref 36.5–49.3)
HGB BLD-MCNC: 12 G/DL (ref 12–17)
MAGNESIUM SERPL-MCNC: 2.7 MG/DL (ref 1.6–2.6)
MCH RBC QN AUTO: 32.2 PG (ref 26.8–34.3)
MCHC RBC AUTO-ENTMCNC: 34.2 G/DL (ref 31.4–37.4)
MCV RBC AUTO: 94 FL (ref 82–98)
PHOSPHATE SERPL-MCNC: 4.2 MG/DL (ref 2.7–4.5)
PLATELET # BLD AUTO: 334 THOUSANDS/UL (ref 149–390)
PMV BLD AUTO: 11.1 FL (ref 8.9–12.7)
POTASSIUM SERPL-SCNC: 5 MMOL/L (ref 3.5–5.3)
RBC # BLD AUTO: 3.73 MILLION/UL (ref 3.88–5.62)
SODIUM SERPL-SCNC: 137 MMOL/L (ref 136–145)
WBC # BLD AUTO: 5.61 THOUSAND/UL (ref 4.31–10.16)

## 2022-02-21 PROCEDURE — 36415 COLL VENOUS BLD VENIPUNCTURE: CPT | Performed by: INTERNAL MEDICINE

## 2022-02-21 PROCEDURE — 80069 RENAL FUNCTION PANEL: CPT | Performed by: INTERNAL MEDICINE

## 2022-02-21 PROCEDURE — 83735 ASSAY OF MAGNESIUM: CPT | Performed by: INTERNAL MEDICINE

## 2022-02-21 PROCEDURE — 85027 COMPLETE CBC AUTOMATED: CPT | Performed by: INTERNAL MEDICINE

## 2022-02-22 NOTE — PROGRESS NOTES
OFFICE FOLLOW UP - Nephrology   Faisal Talamantes 31 y o  male MRN: [id number]       ASSESSMENT and PLAN:  Yin Thomas was seen today for follow-up, chronic kidney disease and hypertension  Diagnoses and all orders for this visit:    IgA nephropathy    Stage 3b chronic kidney disease ([facility] [address] )  -     Renal function panel; Future  -     Magnesium; Future  -     PTH, intact; Future  -     Vitamin D 25 hydroxy; Future  -     CBC and Platelet; Future  -     Protein / creatinine ratio, urine;  Future    Hereditary nephritis    Proteinuria, unspecified type    Elevated blood pressure reading        Chronic kidney disease IIIB:  Assessment and Plan:  Etiology:  Suspect secondary to hereditary nephritis  · Status post renal biopsy:  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 with moderate glomerular basement membrane ending, focal lamellation and loss of staining of COL4A5 that was consistent with heriditary nephritis, minimal IgA nephropathy, tubular atrophy and interstitial fibrosis that was moderate, mild arteriole and arteriolosclerosis, mild  · Previous baseline creatinine 1 6-1 8 dating back to 2019, however has had progression  · Most recent creatinine 2 63 on 02/21/2022  · UPCR in November was 4 42  · Of note, he has 2 uncle and a grandmother with had significant renal disease  · Previous serologic workup was negative  · Losartan changed to lisinopril 5 mg daily with last office visit  · Completed kidney smart class  · they did do genetic counseling and expecting a baby in 10 weeks  · Continue to monitor for progression  · Once eGFR reaches 20 will refer to Dr handy for transplant evaluation  · Will check urine studies and blood work in four months  · Return to office in six months with Dr Micah Begum     X linked hereditary nephritis, with minimal IgA nephropathy  Assessment and plan:  · See above for plan     Elevated blood pressure without the diagnosis of Hypertension:  Assessment and plan:  · Recently changed lisinopril 5 mg daily  · Current blood pressure elevated on arrival improving after 10 minute  · Patient reports blood pressure runs less than 130 at home  · Reports being anxious with understanding of progression of kidney disease  · Encourage low-sodium diet  · Call office his blood pressure greater than 140/90  · Hydration at least 64 oz of water per day  · Continue to check blood pressure daily when able     Proteinuria:  Assessment and plan:  · Continues with elevated U PCR  4 42 in November  · Will repeat UPCR with next office visit  · Will continue to monitor      Age related screening: Your primary caregiver may do yearly screening for colorectal cancer  It is recommended in all men and women over 48years old  You may have screening earlier if you have colon disease or a family history of colorectal cancer  Patient Instructions    All questions asked and answered  The patient has been instructed to call office at [phone] with any questions or concerns  Please obtain prescribed blood work and urine studies prior to next appointment  Avoid NSAID products which include: Motrin, Advil, Ibuprofen, Aleve, Naprosyn, Naproxen, Mobic or Celebrex   Continue lisinopril 5 mg daily  Get updated blood work and urine studies in four months  Return in six months with Dr Madeline Patterson          HPI: Fran Gusman is a 31 y o  male past history of hereditary nephritis, Chronic Kidney Disease IIIB, proteinuria, elevated blood pressure without diagnosis of hypertension who returns to office for renal follow-up  She was last seen on 11/16/2021 with Shiv Avendano and was changed to lisinopril from losartan for better control of proteinuria    The last blood work was done on 2/21/2022  which we have reviewed together reveals increased creatinine 2 63, potassium 5 0  Unfortunately urine studies was not obtained  On discussion, patient is rather nervous with understanding he has had some progression    He reports speaking to his uncle who has had a renal transplant and trying to maintain an appropriate diet  He reports completing the Kidney Smart class with his wife  Has had genetic counseling and is expecting his 1st baby in 9 weeks  Denies chest pain, shortness of breath, dizziness, lightheadedness, nausea, vomiting, urinary issues, fevers, chills, confusion, metal taste, decreased appetite  Continues to work as a  at [employer]  ROS:   All the systems were reviewed and were negative except as documented on the HPI  Allergies: Patient has no known allergies  Medications:   Current Outpatient Medications:     lisinopril (ZESTRIL) 5 mg tablet, Take 1 tablet (5 mg total) by mouth daily, Disp: 90 tablet, Rfl: 3    Past Medical History:   Diagnosis Date    Hematuria     Proteinuria      Past Surgical History:   Procedure Laterality Date    CLAVICLE SURGERY      CT NEEDLE BIOPSY KIDNEY  2/15/2019    RENAL BIOPSY  2/19    X linked [place] syndrom diagnosis  Family History   Problem Relation Age of Onset    Breast cancer Mother     No Known Problems Father     Kidney disease Maternal Uncle     Kidney disease Maternal Grandfather       reports that he has never smoked  He has never used smokeless tobacco  He reports that he does not drink alcohol and does not use drugs  Physical Exam:   Vitals:    02/24/22 0902 02/24/22 0941   BP: 156/78 140/78   BP Location:  Right arm   Patient Position:  Sitting   Cuff Size:  Standard   Pulse: 63    SpO2: 99%    Weight: 73 kg (161 lb)    Height: 5' 11" (1 803 m)      Body mass index is 22 45 kg/m²      General: cooperative, in not acute distress  Eyes: conjunctivae pink, anicteric sclerae  ENT: lips and mucous membranes moist  Neck: supple, full range of motion  Chest: clear breath sounds bilateral, no crackles, ronchus or wheezings  CVS: distinct S1 & S2, normal rate, regular rhythm, no noted JVD, no peripheral edema  Abdomen: soft, non-tender, non-distended, normoactive bowel sounds  Back: no CVA tenderness  Extremities:  No deformities noted  Skin: no rash   Neuro: awake, alert, oriented      Lab Results:      Results from last 7 days   Lab Units 02/21/22  0751   WBC Thousand/uL 5 61   HEMOGLOBIN g/dL 12 0   HEMATOCRIT % 35 1*   PLATELETS Thousands/uL 334   SODIUM mmol/L 137   POTASSIUM mmol/L 5 0   CHLORIDE mmol/L 112*   CO2 mmol/L 19*   BUN mg/dL 60*   CREATININE mg/dL 2 63*   CALCIUM mg/dL 9 7   MAGNESIUM mg/dL 2 7*   PHOSPHORUS mg/dL 4 2           Portions of the record may have been created with voice recognition software  Occasional wrong word or "sound a like" substitutions may have occurred due to the inherent limitations of voice recognition software   Read the chart carefully and recognize,

## 2022-02-23 ENCOUNTER — TELEPHONE (OUTPATIENT)
Dept: NEPHROLOGY | Facility: CLINIC | Age: 37
End: 2022-02-23

## 2022-02-23 NOTE — TELEPHONE ENCOUNTER
Appointment Confirmation   Person confirmed appointment with  If not patient, name of the person Patient    Date and time of appointment 02/24   Patient acknowledged and will be at appointment? yes   Did you advise the patient that they will need a urine sample if they are a new patient?  Follow up   Did you advise the patient to bring their current medications for verification? (including any OTC) N/a    Additional Information

## 2022-02-24 ENCOUNTER — OFFICE VISIT (OUTPATIENT)
Dept: NEPHROLOGY | Facility: CLINIC | Age: 37
End: 2022-02-24
Payer: COMMERCIAL

## 2022-02-24 VITALS
OXYGEN SATURATION: 99 % | WEIGHT: 161 LBS | DIASTOLIC BLOOD PRESSURE: 78 MMHG | HEART RATE: 63 BPM | HEIGHT: 71 IN | BODY MASS INDEX: 22.54 KG/M2 | SYSTOLIC BLOOD PRESSURE: 140 MMHG

## 2022-02-24 DIAGNOSIS — N02.8 IGA NEPHROPATHY: Primary | ICD-10-CM

## 2022-02-24 DIAGNOSIS — R03.0 ELEVATED BLOOD PRESSURE READING: ICD-10-CM

## 2022-02-24 DIAGNOSIS — N18.32 STAGE 3B CHRONIC KIDNEY DISEASE (HCC): ICD-10-CM

## 2022-02-24 DIAGNOSIS — R80.9 PROTEINURIA, UNSPECIFIED TYPE: ICD-10-CM

## 2022-02-24 DIAGNOSIS — N07.8 HEREDITARY NEPHRITIS: ICD-10-CM

## 2022-02-24 PROCEDURE — 99214 OFFICE O/P EST MOD 30 MIN: CPT | Performed by: NURSE PRACTITIONER

## 2022-02-24 NOTE — PATIENT INSTRUCTIONS
 All questions asked and answered  The patient has been instructed to call office at [phone] with any questions or concerns  Please obtain prescribed blood work and urine studies prior to next appointment  Avoid NSAID products which include:  Motrin, Advil, Ibuprofen, Aleve, Naprosyn, Naproxen, Mobic or Celebrex   Continue lisinopril 5 mg daily  Get updated blood work and urine studies in four months  Return in six months with Dr Bossman Payne taking blood pressure at home if BP greater than 140/90 call office

## 2022-06-08 ENCOUNTER — LAB (OUTPATIENT)
Dept: LAB | Age: 37
End: 2022-06-08
Payer: COMMERCIAL

## 2022-06-08 DIAGNOSIS — N18.32 STAGE 3B CHRONIC KIDNEY DISEASE (HCC): ICD-10-CM

## 2022-06-08 LAB
25(OH)D3 SERPL-MCNC: 27 NG/ML (ref 30–100)
ALBUMIN SERPL BCP-MCNC: 3.7 G/DL (ref 3.5–5)
ANION GAP SERPL CALCULATED.3IONS-SCNC: 10 MMOL/L (ref 4–13)
BUN SERPL-MCNC: 56 MG/DL (ref 5–25)
CALCIUM SERPL-MCNC: 9.3 MG/DL (ref 8.3–10.1)
CHLORIDE SERPL-SCNC: 112 MMOL/L (ref 100–108)
CO2 SERPL-SCNC: 18 MMOL/L (ref 21–32)
CREAT SERPL-MCNC: 2.66 MG/DL (ref 0.6–1.3)
CREAT UR-MCNC: 65.1 MG/DL
ERYTHROCYTE [DISTWIDTH] IN BLOOD BY AUTOMATED COUNT: 11.9 % (ref 11.6–15.1)
GFR SERPL CREATININE-BSD FRML MDRD: 29 ML/MIN/1.73SQ M
GLUCOSE P FAST SERPL-MCNC: 98 MG/DL (ref 65–99)
HCT VFR BLD AUTO: 36.6 % (ref 36.5–49.3)
HGB BLD-MCNC: 12.5 G/DL (ref 12–17)
MAGNESIUM SERPL-MCNC: 2.7 MG/DL (ref 1.6–2.6)
MCH RBC QN AUTO: 32.9 PG (ref 26.8–34.3)
MCHC RBC AUTO-ENTMCNC: 34.2 G/DL (ref 31.4–37.4)
MCV RBC AUTO: 96 FL (ref 82–98)
PHOSPHATE SERPL-MCNC: 3.9 MG/DL (ref 2.7–4.5)
PLATELET # BLD AUTO: 340 THOUSANDS/UL (ref 149–390)
PMV BLD AUTO: 11.9 FL (ref 8.9–12.7)
POTASSIUM SERPL-SCNC: 5.2 MMOL/L (ref 3.5–5.3)
PROT UR-MCNC: 178 MG/DL
PROT/CREAT UR: 2.73 MG/G{CREAT} (ref 0–0.1)
PTH-INTACT SERPL-MCNC: 54.4 PG/ML (ref 18.4–80.1)
RBC # BLD AUTO: 3.8 MILLION/UL (ref 3.88–5.62)
SODIUM SERPL-SCNC: 140 MMOL/L (ref 136–145)
WBC # BLD AUTO: 7.65 THOUSAND/UL (ref 4.31–10.16)

## 2022-06-08 PROCEDURE — 84156 ASSAY OF PROTEIN URINE: CPT

## 2022-06-08 PROCEDURE — 82306 VITAMIN D 25 HYDROXY: CPT

## 2022-06-08 PROCEDURE — 36415 COLL VENOUS BLD VENIPUNCTURE: CPT

## 2022-06-08 PROCEDURE — 82570 ASSAY OF URINE CREATININE: CPT

## 2022-06-08 PROCEDURE — 83735 ASSAY OF MAGNESIUM: CPT

## 2022-06-08 PROCEDURE — 85027 COMPLETE CBC AUTOMATED: CPT

## 2022-06-08 PROCEDURE — 83970 ASSAY OF PARATHORMONE: CPT

## 2022-06-08 PROCEDURE — 80069 RENAL FUNCTION PANEL: CPT

## 2022-06-10 DIAGNOSIS — R80.1 PERSISTENT PROTEINURIA: ICD-10-CM

## 2022-06-10 DIAGNOSIS — N18.30 STAGE 3 CHRONIC KIDNEY DISEASE, UNSPECIFIED WHETHER STAGE 3A OR 3B CKD (HCC): Primary | ICD-10-CM

## 2022-06-10 NOTE — RESULT ENCOUNTER NOTE
Called and spoke with patient  Updated blood work reviewed with patient  Creatinine stable at 2 66 no change  UPCR improved with changing to Lisinopril 5 mg daily  BP reported running around 140 most of the time at home  Vit D 27 6  Instructed patient to start OTC Vit D 2,000 units daily, increase Lisinopril to 10 mg daily  Will get BMP in 10 days to check kidney function    Will repeat BMP UPCR prior to office visit in August  Patient agreed

## 2022-06-13 ENCOUNTER — TELEPHONE (OUTPATIENT)
Dept: NEPHROLOGY | Facility: HOSPITAL | Age: 37
End: 2022-06-13

## 2022-06-13 NOTE — TELEPHONE ENCOUNTER
----- Message from Radha Ware [place] sent at 6/10/2022  2:45 PM EDT -----  Please make sure patient has return appt in august for CKD follow up with Dr Juliocesar Rivera if possible    Also let him know I ordered repeated BMP and UPCR prior to appt  Thanks  Marlon Florence

## 2022-06-13 NOTE — TELEPHONE ENCOUNTER
Called patient and set up his follow up appointment in August with Dr Kaveh Katz  Labs have been mailed to the patients home address

## 2022-08-05 ENCOUNTER — TELEPHONE (OUTPATIENT)
Dept: NEPHROLOGY | Facility: CLINIC | Age: 37
End: 2022-08-05

## 2022-08-08 ENCOUNTER — TELEPHONE (OUTPATIENT)
Dept: NEPHROLOGY | Facility: CLINIC | Age: 37
End: 2022-08-08

## 2022-08-08 NOTE — TELEPHONE ENCOUNTER
Appointment Confirmation   Person confirmed appointment with  If not patient, name of the person Voice msg    Date and time of appointment 8/9  1:30    Patient acknowledged and will be at appointment? no    Did you advise the patient that they will need a urine sample if they are a new patient?  N/A    Did you advise the patient to bring their current medications for verification? (including any OTC) Yes    Additional Information

## 2022-08-08 NOTE — TELEPHONE ENCOUNTER
Reschedule Appointment   Person speaking to  Patient   Date of original appointment 8/9/22    New appointment date 10/28/22   Patient on dialysis no   Location [facility]    Provider Dr Rajani Rosado    Additional Information

## 2022-10-19 ENCOUNTER — TELEPHONE (OUTPATIENT)
Dept: NEPHROLOGY | Facility: HOSPITAL | Age: 37
End: 2022-10-19

## 2022-10-20 NOTE — TELEPHONE ENCOUNTER
Patient returned call just to verify that the lab order is in [address] and they are still good- yes

## 2022-10-24 ENCOUNTER — APPOINTMENT (OUTPATIENT)
Dept: LAB | Age: 37
End: 2022-10-24
Payer: COMMERCIAL

## 2022-10-24 DIAGNOSIS — R80.1 PERSISTENT PROTEINURIA: ICD-10-CM

## 2022-10-24 DIAGNOSIS — N18.30 STAGE 3 CHRONIC KIDNEY DISEASE, UNSPECIFIED WHETHER STAGE 3A OR 3B CKD (HCC): ICD-10-CM

## 2022-10-24 LAB
ANION GAP SERPL CALCULATED.3IONS-SCNC: 7 MMOL/L (ref 4–13)
BUN SERPL-MCNC: 62 MG/DL (ref 5–25)
CALCIUM SERPL-MCNC: 9.1 MG/DL (ref 8.3–10.1)
CHLORIDE SERPL-SCNC: 114 MMOL/L (ref 96–108)
CO2 SERPL-SCNC: 18 MMOL/L (ref 21–32)
CREAT SERPL-MCNC: 3.09 MG/DL (ref 0.6–1.3)
CREAT UR-MCNC: 48.5 MG/DL
GFR SERPL CREATININE-BSD FRML MDRD: 24 ML/MIN/1.73SQ M
GLUCOSE P FAST SERPL-MCNC: 104 MG/DL (ref 65–99)
POTASSIUM SERPL-SCNC: 5.9 MMOL/L (ref 3.5–5.3)
PROT UR-MCNC: 184 MG/DL
PROT/CREAT UR: 3.79 MG/G{CREAT} (ref 0–0.1)
SODIUM SERPL-SCNC: 139 MMOL/L (ref 135–147)

## 2022-10-24 PROCEDURE — 80048 BASIC METABOLIC PNL TOTAL CA: CPT

## 2022-10-24 PROCEDURE — 84156 ASSAY OF PROTEIN URINE: CPT

## 2022-10-24 PROCEDURE — 82570 ASSAY OF URINE CREATININE: CPT

## 2022-10-24 PROCEDURE — 36415 COLL VENOUS BLD VENIPUNCTURE: CPT

## 2022-10-25 ENCOUNTER — TELEPHONE (OUTPATIENT)
Dept: NEPHROLOGY | Facility: HOSPITAL | Age: 37
End: 2022-10-25

## 2022-10-25 DIAGNOSIS — N02.8 IGA NEPHROPATHY: ICD-10-CM

## 2022-10-25 DIAGNOSIS — N18.32 STAGE 3B CHRONIC KIDNEY DISEASE (HCC): Primary | ICD-10-CM

## 2022-10-25 NOTE — TELEPHONE ENCOUNTER
----- Message from Adelaida Zaidi DO sent at 10/25/2022  8:52 AM EDT -----  Thank you  Nephrology team please have him hold his lisinopril and repeat a BMP ideally before I see him on Friday    To make sure potassium is not worsening

## 2022-10-25 NOTE — TELEPHONE ENCOUNTER
Called and spoke to patient  Patient aware to hold his lisinopril and repeat a BMP ideally before I see him on Friday  Labs been ordered

## 2022-10-26 ENCOUNTER — APPOINTMENT (OUTPATIENT)
Dept: LAB | Age: 37
End: 2022-10-26

## 2022-10-26 DIAGNOSIS — N18.32 STAGE 3B CHRONIC KIDNEY DISEASE (HCC): ICD-10-CM

## 2022-10-26 DIAGNOSIS — N02.8 IGA NEPHROPATHY: ICD-10-CM

## 2022-10-26 LAB
ANION GAP SERPL CALCULATED.3IONS-SCNC: 7 MMOL/L (ref 4–13)
BUN SERPL-MCNC: 65 MG/DL (ref 5–25)
CALCIUM SERPL-MCNC: 8.9 MG/DL (ref 8.3–10.1)
CHLORIDE SERPL-SCNC: 113 MMOL/L (ref 96–108)
CO2 SERPL-SCNC: 17 MMOL/L (ref 21–32)
CREAT SERPL-MCNC: 2.98 MG/DL (ref 0.6–1.3)
GFR SERPL CREATININE-BSD FRML MDRD: 25 ML/MIN/1.73SQ M
GLUCOSE P FAST SERPL-MCNC: 98 MG/DL (ref 65–99)
POTASSIUM SERPL-SCNC: 5.4 MMOL/L (ref 3.5–5.3)
SODIUM SERPL-SCNC: 137 MMOL/L (ref 135–147)

## 2022-10-27 ENCOUNTER — TELEPHONE (OUTPATIENT)
Dept: NEPHROLOGY | Facility: CLINIC | Age: 37
End: 2022-10-27

## 2022-10-27 NOTE — TELEPHONE ENCOUNTER
Appointment Confirmation   Person confirmed appointment with  If not patient, name of the person Patient    Date and time of appointment 10/28    Patient acknowledged and will be at appointment?  yes   Did you advise the patient that they will need a urine sample if they are a new patient? no   Did you advise the patient to bring their current medications for verification? (including any OTC) yes   Additional Information

## 2022-10-28 ENCOUNTER — OFFICE VISIT (OUTPATIENT)
Dept: NEPHROLOGY | Facility: CLINIC | Age: 37
End: 2022-10-28

## 2022-10-28 VITALS
DIASTOLIC BLOOD PRESSURE: 84 MMHG | BODY MASS INDEX: 21.56 KG/M2 | SYSTOLIC BLOOD PRESSURE: 132 MMHG | WEIGHT: 154 LBS | HEART RATE: 89 BPM | HEIGHT: 71 IN | OXYGEN SATURATION: 99 %

## 2022-10-28 DIAGNOSIS — E87.20 METABOLIC ACIDOSIS: Primary | ICD-10-CM

## 2022-10-28 DIAGNOSIS — R03.0 ELEVATED BLOOD PRESSURE READING: ICD-10-CM

## 2022-10-28 DIAGNOSIS — N18.32 STAGE 3B CHRONIC KIDNEY DISEASE (HCC): ICD-10-CM

## 2022-10-28 DIAGNOSIS — N02.8 IGA NEPHROPATHY: ICD-10-CM

## 2022-10-28 RX ORDER — MELATONIN
2000 DAILY
Qty: 90 TABLET | Refills: 3 | Status: SHIPPED | OUTPATIENT
Start: 2022-10-28

## 2022-10-28 RX ORDER — SODIUM BICARBONATE 650 MG/1
1300 TABLET ORAL 2 TIMES DAILY
Qty: 360 TABLET | Refills: 3 | Status: SHIPPED | OUTPATIENT
Start: 2022-10-28

## 2022-10-28 NOTE — PATIENT INSTRUCTIONS
Chronic Kidney Disease   WHAT YOU NEED TO KNOW:   Chronic kidney disease (CKD) is the gradual and permanent loss of kidney function  It is also called chronic kidney failure, or chronic renal insufficiency  Normally, the kidneys remove fluid, chemicals, and waste from your blood  These wastes are turned into urine by your kidneys  CKD may worsen over time and lead to kidney failure  Your CKD team will help you and your family plan for your care at home  The team will help you create goals and find ways to meet your goals  Your care plan may change over time as your needs change  DISCHARGE INSTRUCTIONS:   Call your local emergency number (911 in the [address]) if:   You have a seizure  You have shortness of breath  Return to the emergency department if:   You are confused and very drowsy  Call your doctor or nephrologist if:   You suddenly gain or lose more weight than your healthcare provider has told you is okay  You have itchy skin or a rash  You urinate more or less than you normally do  You have blood in your urine  You have nausea and are vomiting  You have fatigue or muscle weakness  You have hiccups that will not stop  You have questions or concerns about your condition or care  Medicines:   Medicines  may be given to decrease blood pressure and get rid of extra fluid  You may also receive medicine to manage health conditions that may occur with CKD, such as anemia, diabetes, and heart disease  Take your medicine as directed  Contact your healthcare provider if you think your medicine is not helping or if you have side effects  Tell him or her if you are allergic to any medicine  Keep a list of the medicines, vitamins, and herbs you take  Include the amounts, and when and why you take them  Bring the list or the pill bottles to follow-up visits  Carry your medicine list with you in case of an emergency  What you can do to manage CKD:   Management may include making some lifestyle changes  Tell your healthcare provider if you have any concerns about being able to make changes  He or she can help you find solutions, including working with specialists  Ask for help creating a plan to break large goals into smaller steps  Your plan may include any of the following:  Manage other health conditions  Your healthcare provider will work with you to make a care plan that meets your needs  You will be checked regularly for heart disease or other conditions that can make CKD worse, such as diabetes  Your blood pressure will be closely monitored  You will also get a target blood pressure and help making a plan to reach your target  This may include taking your blood pressure at home  Maintain a healthy weight  Your weight and body mass index (BMI) will be checked regularly  BMI helps find if your weight is healthy for your height  Your healthcare provider will use other tests to check your muscle and protein levels  Extra weight can strain your kidneys  A low weight or low muscle mass can make you feel more tired  You may have trouble doing your daily activities  Ask your provider what a healthy weight is for you  He or she can help you create a plan to lose or gain weight safely, if needed  The plan may include keeping a food diary  This is a list of foods and liquids you have each day  Your provider will use the diary to help you make changes, if needed  Changes are based on your health and any other conditions you have, such as diabetes  Create an exercise plan  Regular exercise can help you manage CKD, high blood pressure, and diabetes  Exercise also helps control weight  Your provider can help you create exercise goals and a plan to reach those goals  For example, your goal may be to exercise for 30 minutes in a day  Your plan can include breaking exercise into 10 minute sessions, 3 times during the day  Create a healthy eating plan    Your provider may tell you to eat food low in potassium, phosphorus, or protein  Your provider may also recommend vitamin or mineral supplements  Do not take any supplements without talking to your provider  A dietitian can help you plan meals if needed  Ask how much liquid to drink each day and which liquids are best for you  Limit sodium (salt) as directed  You may need to limit sodium to less than 2,300 milligrams (mg) each day  Ask your dietitian or healthcare provider how much sodium you can have each day  The amount depends on your stage of kidney disease  Table salt, canned foods, soups, salted snacks, and processed meats, like deli meats and sausage, are high in sodium  Your provider or a dietitian can show you how to read food labels for sodium  Limit alcohol as directed  Alcohol can cause fluid retention and can affect your kidneys  Ask how much alcohol is safe for you  A drink of alcohol is 12 ounces of beer, 5 ounces of wine, or 1½ ounces of liquor  Do not smoke  Nicotine and other chemicals in cigarettes and cigars can cause kidney damage  Ask your provider for information if you currently smoke and need help to quit  E-cigarettes or smokeless tobacco still contain nicotine  Talk to your provider before you use these products  Ask about over-the-counter medicines  Medicines such as NSAIDs and laxatives may harm your kidneys  Some cough and cold medicines can raise your blood pressure  Always ask if a medicine is safe before you take it  Ask about vaccines you may need  CKD can increase your risk for infections such as pneumonia, influenza, and hepatitis  Vaccines lower your risk for infection  Your healthcare provider will tell you which vaccines you need and when to get them  Follow up with your doctor or nephrologist as directed: You will need to return for tests to monitor your kidney and nerve function, and your parathyroid hormone level   Your medicines may be changed, based on certain test results  Write down your questions so you remember to ask them during your visits  © Copyright [employer] 2022 Information is for End User's use only and may not be sold, redistributed or otherwise used for commercial purposes  All illustrations and images included in CareNotes® are the copyrighted property of [employer]  or [employer]  The above information is an  only  It is not intended as medical advice for individual conditions or treatments  Talk to your doctor, nurse or pharmacist before following any medical regimen to see if it is safe and effective for you

## 2022-10-28 NOTE — PROGRESS NOTES
OFFICE FOLLOW UP - Nephrology   Lieutenant Padilla 30 y o  male MRN: [id number]    Encounter: [id number]        ASSESSMENT & PLAN    59-year-old male who originally presented with hematuria and proteinuria diagnosed biopsy-proven hereditary nephritis    1  CKD G4A3  o Etiology: Biopsy proven X linked hereditary nephritis, with minimal IgA nephropathy  o Urine: UPCR 3 79 grams of protein  o Imagin US: RK 10 2 x 3 9 cm LK 11 5 cm tiny cysts no PVR  o Unfortunately progressively worsening creatinine increased from 2 1 from 2021, to 2 45 in 2021, to 2 6 in 2022 and now 3 in 2022  o Plan:  Patient has an aggressive hereditary nephritis  We did try Ace inhibition however now with hyperkalemia  At this point will monitor when GFR is less than or equal to 20 will plan on transplant evaluation  2  Electrolytes:  Hyperkalemia, Chronic metabolic acidosis  o Hyperkalemia-in the setting of progressive CKD we discussed a low potassium diet and plan fill was given to  We discontinued potassium chloride and started sodium bicarbonate  o Non-anion gap metabolic acidosis-related to his CKD-will start sodium bicarbonate 1300 mg twice daily repeat BMP in 2 weeks  3  Blood Pressure:  o Elevated office blood pressure-has not been checking his blood pressure at home  We are stopping lisinopril so will have him check his blood pressures at home if they are elevated above 135/90 will consider starting amlodipine  4  Hemoglobin:  o His hemoglobin and platelet counts are stable  5  Bone Mineral Disease-vitamin-D deficiency  o His PTH was normal it is vitamin D25 hydroxy was 27  o Will start vitamin D3 2000 units  6  Cardiovascular  o Blood pressure control  o Non diabetic  o Should have lipids drawn yearly    DISCUSSION:    It was nice seeing Jocelyn today    His creatinine unfortunately was higher than previous at 2 9 to 3 his GFR is now around 25 mL/minutes he is having some morning nausea which I do not think is related to uremia  His bicarbonate was low as well  As above will plan on stopping lisinopril, starting sodium bicarbonate, monitoring home blood pressures, repeating BMP in 2 weeks  And if stable will follow-up in 3-4 months    Once his GFR is closer to 20 will have him evaluated for renal transplant    He was in agreement with this plan and had no further questions  HPI: Anu Seo is a 30 y o  male [facility] 30[facility] he has an uncle has male family member who are on dialysis and have been transplanted  Overall he is doing relatively well he denies any acute chest pain or shortness of breath no fevers or chills no nausea vomiting diarrhea or constipation no foamy or bloody urine  He is having some morning nausea he admits to high potassium intake  ROS:   Review of Systems   Constitutional: Negative  HENT: Negative  Eyes: Negative  Respiratory: Negative  Cardiovascular: Negative  Gastrointestinal: Positive for nausea  Endocrine: Negative  Genitourinary: Negative  Musculoskeletal: Negative  Allergic/Immunologic: Negative  Neurological: Negative  Hematological: Negative  Psychiatric/Behavioral: Negative  All other systems reviewed and are negative  Allergies: Patient has no known allergies  Medications:   Current Outpatient Medications:   •  cholecalciferol (VITAMIN D3) 1,000 units tablet, Take 2 tablets (2,000 Units total) by mouth daily, Disp: 90 tablet, Rfl: 3  •  sodium bicarbonate 650 mg tablet, Take 2 tablets (1,300 mg total) by mouth 2 (two) times a day, Disp: 360 tablet, Rfl: 3    Past Medical History:   Diagnosis Date   • Hematuria    • Proteinuria      Past Surgical History:   Procedure Laterality Date   • CLAVICLE SURGERY     • CT NEEDLE BIOPSY KIDNEY  2/15/2019   • RENAL BIOPSY  2/19    X linked [place] syndrom diagnosis       Family History   Problem Relation Age of Onset   • Breast cancer Mother    • No Known Problems Father    • Kidney disease Maternal Uncle    • Kidney disease Maternal Grandfather       reports that he has never smoked  He has never used smokeless tobacco  He reports that he does not drink alcohol and does not use drugs  OBJECTIVE:    Vitals:    10/28/22 0730 10/28/22 0805   BP: 140/80 132/84   BP Location: Left arm    Patient Position: Sitting    Cuff Size: Adult    Pulse: 89    SpO2: 99%    Weight: 69 9 kg (154 lb)    Height: 5' 11" (1 803 m)         Body mass index is 21 48 kg/m²  [unfilled]     Weight (last 2 days)     Date/Time Weight    10/28/22 0730 69 9 (154)             Physical exam:  Physical Exam  Constitutional:       Appearance: He is normal weight  HENT:      Head: Normocephalic and atraumatic  Right Ear: External ear normal       Left Ear: External ear normal       Nose: Nose normal       Mouth/Throat:      Pharynx: Oropharynx is clear  Eyes:      Extraocular Movements: Extraocular movements intact  Conjunctiva/sclera: Conjunctivae normal       Pupils: Pupils are equal, round, and reactive to light  Cardiovascular:      Rate and Rhythm: Normal rate and regular rhythm  Pulses: Normal pulses  Heart sounds: Normal heart sounds  Pulmonary:      Effort: Pulmonary effort is normal       Breath sounds: Normal breath sounds  Abdominal:      General: Abdomen is flat  Bowel sounds are normal       Palpations: Abdomen is soft  Genitourinary:     Prostate: Normal    Musculoskeletal:         General: Normal range of motion  Cervical back: Normal range of motion  Skin:     General: Skin is warm and dry  Neurological:      General: No focal deficit present  Mental Status: He is alert and oriented to person, place, and time  Psychiatric:         Mood and Affect: Mood normal          Behavior: Behavior normal          Thought Content:  Thought content normal          Judgment: Judgment normal          Lab Results:  Results from last 7 days   Lab Units 10/26/22  0724 10/24/22  0808   POTASSIUM mmol/L 5 4* 5 9*   CHLORIDE mmol/L 113* 114*   CO2 mmol/L 17* 18*   BUN mg/dL 65* 62*   CREATININE mg/dL 2 98* 3 09*   CALCIUM mg/dL 8 9 9 1       Portions of the record may have been created with voice recognition software  Occasional wrong word or "sound a like" substitutions may have occurred due to the inherent limitations of voice recognition software  Read the chart carefully and recognize, using context, where substitutions have occurred  If you have any questions, please contact the dictating provider

## 2022-11-07 ENCOUNTER — TELEPHONE (OUTPATIENT)
Dept: NEPHROLOGY | Facility: CLINIC | Age: 37
End: 2022-11-07

## 2022-11-07 DIAGNOSIS — N18.4 CKD (CHRONIC KIDNEY DISEASE) STAGE 4, GFR 15-29 ML/MIN (HCC): Primary | ICD-10-CM

## 2022-11-30 ENCOUNTER — OFFICE VISIT (OUTPATIENT)
Dept: NEPHROLOGY | Facility: CLINIC | Age: 37
End: 2022-11-30

## 2022-11-30 VITALS
SYSTOLIC BLOOD PRESSURE: 120 MMHG | DIASTOLIC BLOOD PRESSURE: 60 MMHG | WEIGHT: 153 LBS | BODY MASS INDEX: 21.42 KG/M2 | HEIGHT: 71 IN

## 2022-11-30 DIAGNOSIS — Z01.818 PRE-TRANSPLANT EVALUATION FOR CKD (CHRONIC KIDNEY DISEASE): Primary | ICD-10-CM

## 2022-11-30 NOTE — PROGRESS NOTES
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 and had proteinuria, hematuria and HTN    Renal biopsy in 2019 with loss of staining for alpha 5 chain of type IV collagen 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  - no significant hearing loss    Plan   1  Will likely proceed with standard age appropriate kidney transplant work up  Patient has actively declining GFR  Most recent GFR was 25  Patient does have family member, maternal uncle with same disease process  Patient has not had genetic testing  If moving forward with testing, will talk to SW teams to assist in obtaining appropriate consents to attempt to obtain genetic testing results that the patient's uncle would have had (pt believes uncle had genetic testing completed)  Will review with the transplant committee  2  The need to avoid blood transfusion to decrease allosensitization was explained to the patient  3  The advantages of a living donor over a  donor was explained to the patient and family  I strongly encouraged the patient and family to pursue a living donor  4  Uncle - maternal brother- Family history of kidney disease with family members requiring dialysis and transplantation-  5  Patient with history of hereditary nephritis - would benefit from genetic testing  6  Social - wife; has a 11 month old and a pet dog  Wife is main support system  7  Congestion - improving  8   Pt has potential living donor who has come forward but will attempt to search for more (we reviewed techniques in office)    I have discussed with Debby Ortega and family at length about the risk and benefits of kidney transplantation  I have strongly encouraged him to pursue living donation, and explained to him the benefits of living donation over  donor transplantation  We have briefly discussed the surgical procedure  We have discussed about the need for life long immunosuppression and the importance of compliance  We have discussed the side effects of immunosuppression including but not limited to infection, malignancies and developing/worsening diabetes control  Katie Cardoso verbalized understanding and is interested in pursuing transplant  During this visit, I spent 60 minutes with the patient; more than 50% of the time I counseled the patient regarding the risks and benefits of kidney transplantation, the immediate and long-term complications of kidney transplantation, and the advantage of receiving a living donor kidney transplant  It was a pleasure evaluating your patient in the office today  Thank you for allowing our team to participate in the care of Mr Katie Cardoso  Please do not hesitate to contact our team if further issues/questions shall arise in the interim  HISTORY OF PRESENT ILLNESS    Katie Cardoso is a 30 y o  male seen in the Nephrology Clinic for evaluation for kidney transplant  CKD IV due to hereditary nephritis  Renal disease is biopsy proven  Primary Nephrologist is Dr Gerardo Romberg  Native Urine Output: yes  Hx of voiding difficulty: no  Hx of hematuria: yes  Hx of proteinuria: yes  Hx of nephrolithiasis: no  Hx of recurrent urinary tract infection: no    HTN: onset 2019,  hx of malignant HTN: no, admission for HTN emergencies: no    DM type n/a    PAD/PVD: no, Claudication: no  Cardiac history - CAD: no, MI: none for family    Primary Cardiologist: n/a    Exercise tolerance: works as ;     Hx of CVA: no    Hx of DVT/PE: no    Viral Infection:    HIV: no  Hep B: no  Hep C: no    Cancer: History of cancer: cancer - mother with breast cancer; none for patient    Male:    Colonoscopy: no  Prostate: no    Blood transfusion: no    Last admission 2/2019    Review Of Systems:     Constitutional: nl appetite  nl fevers, chills, involuntary weight gain or weight loss  Eyes: no eye disease, double vision  ENT: slightly decreased hearing but not major per pt  Respiratory: no SOB; + cough  Cardiovascular: no CP, palpitations, claudication, edema  GI: no N/V/D/C, abdominal pain, melena, BRBPR  : see HPI  Endocrine: no thyroid disease  Heme: no bleeding or clotting disorders or swollen lymph nodes  MS: no joint effusions or deformities  Skin: no skin disease  Neuro: no HA, seizures, numbness, tingling, focal weakness  Psych: no depression, anxiety    Lives with wife and daughter (7 mo)  - one dog    Employment history:     HD Access: n/a  Abdominal Surgeries: none    Smoke: none  ETOH: none  Drugs: none    Past Medical History:   Diagnosis Date   • Hematuria    • Proteinuria      Past Surgical History:   Procedure Laterality Date   • CLAVICLE SURGERY     • CT NEEDLE BIOPSY KIDNEY  2/15/2019   • RENAL BIOPSY  2/19    X linked [place] syndrom diagnosis         Family History   Problem Relation Age of Onset   • Breast cancer Mother    • No Known Problems Father    • Kidney disease Maternal Uncle    • Kidney disease Maternal Grandfather      Social History     Socioeconomic History   • Marital status: /Civil Union     Spouse name: None   • Number of children: None   • Years of education: None   • Highest education level: None   Occupational History   • None   Tobacco Use   • Smoking status: Never   • Smokeless tobacco: Never   Substance and Sexual Activity   • Alcohol use: Never   • Drug use: Never   • Sexual activity: Yes     Partners: Female     Birth control/protection: None   Other Topics Concern   • None   Social History Narrative   • None     Social Determinants of Health     Financial Resource Strain: Not on file   Food Insecurity: Not on file Transportation Needs: Not on file   Physical Activity: Not on file   Stress: Not on file   Social Connections: Not on file   Intimate Partner Violence: Not on file   Housing Stability: Not on file     Living donors: one potential (54s yo)    Current Outpatient Medications   Medication Sig Dispense Refill   • cholecalciferol (VITAMIN D3) 1,000 units tablet Take 2 tablets (2,000 Units total) by mouth daily 90 tablet 3   • sodium bicarbonate 650 mg tablet Take 2 tablets (1,300 mg total) by mouth 2 (two) times a day 360 tablet 3     No current facility-administered medications for this visit  Patient has no known allergies      Physical Exam:    Ht 5' 11" (1 803 m)   Wt 69 4 kg (153 lb)   BMI 21 34 kg/m²     General : NAD    HEENT: anicteric, no thrush, dental appropriate  Cardiac:  RRR, S1, S2 normal,  no murmur    Lung:  CTAB   Abdomen:  soft, nontender, no ascites   No bruit  no edema   Neuro:no focal neuro deficits   Skin: no rash; lipoma RUQ abd  Carotid bruit: no

## 2022-11-30 NOTE — LETTER
2022     Yanelis Correa DO  [facility]  [address] [address]    Patient: Chapin Ellison   YOB: 1985   Date of Visit: 2022       Dear Dr Georgeanna Boxer:    Thank you for referring Nila David to me for evaluation  Below are my notes for this consultation  If you have questions, please do not hesitate to call me  I look forward to following your patient along with you  Sincerely,        Elenita Herr MD        CC: DO Elenita Arevalo MD  2022  1:09 PM  Sign when Signing Visit  Angelito Flynn is a 30 y o  male  referred by Dr Juliocesar Rivera with a past medical history of CKD stage 4, 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, restleg leg, non smoker, no ETOH, no drugs,  seen in the Nephrology Clinic for pre-transplant kidney evaluation  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 urine testing and had proteinuria, hematuria and HTN    Renal biopsy in  with loss of staining for alpha 5 chain of type IV collagen 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  - no significant hearing loss    Plan   1  Will likely proceed with standard age appropriate kidney transplant work up  Patient has actively declining GFR  Most recent GFR was 25  Patient does have family member, maternal uncle with same disease process  Patient has not had genetic testing  Would like to obtain genetic testing results that the patient's uncle would have had  Will review with the transplant committee  2  The need to avoid blood transfusion to decrease allosensitization was explained to the patient  3  The advantages of a living donor over a  donor was explained to the patient and family  I strongly encouraged the patient and family to pursue a living donor     4  Uncle - maternal brother- Family history of kidney disease with family members requiring dialysis and transplantation-  5  Patient with history of hereditary nephritis - would benefit from genetic testing  6  Social - wife; has a 11 month old and a pet dog  Wife is main support system  7  Congestion - improving    I have discussed with Marietta Hartmann and family at length about the risk and benefits of kidney transplantation  I have strongly encouraged him to pursue living donation, and explained to him the benefits of living donation over  donor transplantation  We have briefly discussed the surgical procedure  We have discussed about the need for life long immunosuppression and the importance of compliance  We have discussed the side effects of immunosuppression including but not limited to infection, malignancies and developing/worsening diabetes control  Marietta Hartmann verbalized understanding and is interested in pursuing transplant  During this visit, I spent 60 minutes with the patient; more than 50% of the time I counseled the patient regarding the risks and benefits of kidney transplantation, the immediate and long-term complications of kidney transplantation, and the advantage of receiving a living donor kidney transplant  It was a pleasure evaluating your patient in the office today  Thank you for allowing our team to participate in the care of Mr Marietta Hartmann  Please do not hesitate to contact our team if further issues/questions shall arise in the interim  HISTORY OF PRESENT ILLNESS    Marietta Hartmann is a 30 y o  male seen in the Nephrology Clinic for evaluation for kidney transplant  CKD IV due to hereditary nephritis  Renal disease is biopsy proven  Primary Nephrologist is Dr Sherif Rodas      Native Urine Output: yes  Hx of voiding difficulty: no  Hx of hematuria: yes  Hx of proteinuria: yes  Hx of nephrolithiasis: no  Hx of recurrent urinary tract infection: no    HTN: onset 2019,  hx of malignant HTN: no, admission for HTN emergencies: no    DM type n/a    PAD/PVD: no, Claudication: no  Cardiac history - CAD: no, MI: none for family    Primary Cardiologist: n/a    Exercise tolerance: works as ; Hx of CVA: no    Hx of DVT/PE: no    Viral Infection:    HIV: no  Hep B: no  Hep C: no    Cancer: History of cancer: cancer - mother with breast cancer; none for patient    Male:    Colonoscopy: no  Prostate: no    Blood transfusion: no    Last admission 2/2019    Review Of Systems:     Constitutional: nl appetite  nl fevers, chills, involuntary weight gain or weight loss  Eyes: no eye disease, double vision  ENT: slightly decreased hearing but not major per pt  Respiratory: no SOB; + cough  Cardiovascular: no CP, palpitations, claudication, edema  GI: no N/V/D/C, abdominal pain, melena, BRBPR  : see HPI  Endocrine: no thyroid disease  Heme: no bleeding or clotting disorders or swollen lymph nodes  MS: no joint effusions or deformities  Skin: no skin disease  Neuro: no HA, seizures, numbness, tingling, focal weakness  Psych: no depression, anxiety    Lives with wife and daughter (7 mo)  - one dog    Employment history:     HD Access: n/a  Abdominal Surgeries: none    Smoke: none  ETOH: none  Drugs: none    Past Medical History:   Diagnosis Date   • Hematuria    • Proteinuria      Past Surgical History:   Procedure Laterality Date   • CLAVICLE SURGERY     • CT NEEDLE BIOPSY KIDNEY  2/15/2019   • RENAL BIOPSY  2/19    X linked Alports syndrom diagnosis         Family History   Problem Relation Age of Onset   • Breast cancer Mother    • No Known Problems Father    • Kidney disease Maternal Uncle    • Kidney disease Maternal Grandfather      Social History     Socioeconomic History   • Marital status: /Civil Union     Spouse name: None   • Number of children: None   • Years of education: None   • Highest education level: None   Occupational History   • None   Tobacco Use   • Smoking status: Never   • Smokeless tobacco: Never   Substance and Sexual Activity   • Alcohol use: Never   • Drug use: Never   • Sexual activity: Yes     Partners: Female     Birth control/protection: None   Other Topics Concern   • None   Social History Narrative   • None     Social Determinants of Health     Financial Resource Strain: Not on file   Food Insecurity: Not on file   Transportation Needs: Not on file   Physical Activity: Not on file   Stress: Not on file   Social Connections: Not on file   Intimate Partner Violence: Not on file   Housing Stability: Not on file     Living donors: one potential (54s yo)    Current Outpatient Medications   Medication Sig Dispense Refill   • cholecalciferol (VITAMIN D3) 1,000 units tablet Take 2 tablets (2,000 Units total) by mouth daily 90 tablet 3   • sodium bicarbonate 650 mg tablet Take 2 tablets (1,300 mg total) by mouth 2 (two) times a day 360 tablet 3     No current facility-administered medications for this visit  Patient has no known allergies      Physical Exam:    Ht 5' 11" (1 803 m)   Wt 69 4 kg (153 lb)   BMI 21 34 kg/m²     General : NAD    HEENT: anicteric, no thrush, dental appropriate  Cardiac:  RRR, S1, S2 normal,  no murmur    Lung:  CTAB   Abdomen:  soft, nontender, no ascites   No bruit  no edema   Neuro:no focal neuro deficits   Skin: no rash; lipoma RUQ abd  Carotid bruit: no

## 2023-03-16 ENCOUNTER — TELEPHONE (OUTPATIENT)
Dept: NEPHROLOGY | Facility: HOSPITAL | Age: 38
End: 2023-03-16

## 2023-03-16 NOTE — TELEPHONE ENCOUNTER
Called and spoke with Patient to complete their bloodwork prior to their appointment on 3/23 with Dr Radha Ruiz at the [facility]

## 2023-03-17 ENCOUNTER — APPOINTMENT (OUTPATIENT)
Dept: LAB | Age: 38
End: 2023-03-17

## 2023-03-17 DIAGNOSIS — E87.20 METABOLIC ACIDOSIS: ICD-10-CM

## 2023-03-17 DIAGNOSIS — R03.0 ELEVATED BLOOD PRESSURE READING: ICD-10-CM

## 2023-03-17 DIAGNOSIS — N18.32 STAGE 3B CHRONIC KIDNEY DISEASE (HCC): ICD-10-CM

## 2023-03-17 DIAGNOSIS — N02.8 IGA NEPHROPATHY: ICD-10-CM

## 2023-03-17 LAB
ALBUMIN SERPL BCP-MCNC: 3 G/DL (ref 3.5–5)
ALP SERPL-CCNC: 67 U/L (ref 46–116)
ALT SERPL W P-5'-P-CCNC: 25 U/L (ref 12–78)
ANION GAP SERPL CALCULATED.3IONS-SCNC: 7 MMOL/L (ref 4–13)
AST SERPL W P-5'-P-CCNC: 18 U/L (ref 5–45)
BACTERIA UR QL AUTO: ABNORMAL /HPF
BILIRUB SERPL-MCNC: 0.62 MG/DL (ref 0.2–1)
BILIRUB UR QL STRIP: NEGATIVE
BUN SERPL-MCNC: 63 MG/DL (ref 5–25)
CALCIUM ALBUM COR SERPL-MCNC: 9.5 MG/DL (ref 8.3–10.1)
CALCIUM SERPL-MCNC: 8.7 MG/DL (ref 8.3–10.1)
CHLORIDE SERPL-SCNC: 114 MMOL/L (ref 96–108)
CLARITY UR: CLEAR
CO2 SERPL-SCNC: 19 MMOL/L (ref 21–32)
COLOR UR: COLORLESS
CREAT SERPL-MCNC: 4.18 MG/DL (ref 0.6–1.3)
ERYTHROCYTE [DISTWIDTH] IN BLOOD BY AUTOMATED COUNT: 12.4 % (ref 11.6–15.1)
GFR SERPL CREATININE-BSD FRML MDRD: 17 ML/MIN/1.73SQ M
GLUCOSE P FAST SERPL-MCNC: 111 MG/DL (ref 65–99)
GLUCOSE UR STRIP-MCNC: NEGATIVE MG/DL
HCT VFR BLD AUTO: 34.3 % (ref 36.5–49.3)
HGB BLD-MCNC: 11.4 G/DL (ref 12–17)
HGB UR QL STRIP.AUTO: ABNORMAL
KETONES UR STRIP-MCNC: NEGATIVE MG/DL
LEUKOCYTE ESTERASE UR QL STRIP: NEGATIVE
MAGNESIUM SERPL-MCNC: 3 MG/DL (ref 1.6–2.6)
MCH RBC QN AUTO: 31.5 PG (ref 26.8–34.3)
MCHC RBC AUTO-ENTMCNC: 33.2 G/DL (ref 31.4–37.4)
MCV RBC AUTO: 95 FL (ref 82–98)
NITRITE UR QL STRIP: NEGATIVE
NON-SQ EPI CELLS URNS QL MICRO: ABNORMAL /HPF
PH UR STRIP.AUTO: 6.5 [PH]
PHOSPHATE SERPL-MCNC: 4.4 MG/DL (ref 2.7–4.5)
PLATELET # BLD AUTO: 370 THOUSANDS/UL (ref 149–390)
PMV BLD AUTO: 12.2 FL (ref 8.9–12.7)
POTASSIUM SERPL-SCNC: 5.2 MMOL/L (ref 3.5–5.3)
PROT SERPL-MCNC: 6.1 G/DL (ref 6.4–8.4)
PROT UR STRIP-MCNC: ABNORMAL MG/DL
PTH-INTACT SERPL-MCNC: 96.8 PG/ML (ref 18.4–80.1)
RBC # BLD AUTO: 3.62 MILLION/UL (ref 3.88–5.62)
RBC #/AREA URNS AUTO: ABNORMAL /HPF
SODIUM SERPL-SCNC: 140 MMOL/L (ref 135–147)
SP GR UR STRIP.AUTO: 1.01 (ref 1–1.03)
URATE SERPL-MCNC: 6.5 MG/DL (ref 3.5–8.5)
UROBILINOGEN UR STRIP-ACNC: <2 MG/DL
WBC # BLD AUTO: 6.67 THOUSAND/UL (ref 4.31–10.16)
WBC #/AREA URNS AUTO: ABNORMAL /HPF

## 2023-03-18 LAB
CREAT UR-MCNC: 60.3 MG/DL
PROT UR-MCNC: 307 MG/DL
PROT/CREAT UR: 5.09 MG/G{CREAT} (ref 0–0.1)

## 2023-03-20 ENCOUNTER — TELEPHONE (OUTPATIENT)
Dept: NEPHROLOGY | Facility: HOSPITAL | Age: 38
End: 2023-03-20

## 2023-03-20 NOTE — TELEPHONE ENCOUNTER
----- Message from Francois Kawasaki, DO sent at 3/20/2023 11:24 AM EDT -----  His labs were reviewed and likely progression of his kidney disease  Can you make sure he has no nausea vomiting no other signs of acute illness    If he feels ok we will discuss more at his upcoming apppointment

## 2023-03-20 NOTE — TELEPHONE ENCOUNTER
Called and spoke with patient  Patient aware labs reviewed and likely progression of his kidney disease  Patient stated he feel ok no symptoms

## 2023-03-20 NOTE — TELEPHONE ENCOUNTER
----- Message from Vonnie Egan DO sent at 3/20/2023 11:24 AM EDT -----  His labs were reviewed and likely progression of his kidney disease  Can you make sure he has no nausea vomiting no other signs of acute illness    If he feels ok we will discuss more at his upcoming apppointment

## 2023-03-23 ENCOUNTER — OFFICE VISIT (OUTPATIENT)
Dept: NEPHROLOGY | Facility: CLINIC | Age: 38
End: 2023-03-23

## 2023-03-23 VITALS
HEART RATE: 65 BPM | SYSTOLIC BLOOD PRESSURE: 144 MMHG | BODY MASS INDEX: 22.12 KG/M2 | HEIGHT: 71 IN | WEIGHT: 158 LBS | DIASTOLIC BLOOD PRESSURE: 82 MMHG

## 2023-03-23 DIAGNOSIS — N18.4 CKD (CHRONIC KIDNEY DISEASE) STAGE 4, GFR 15-29 ML/MIN (HCC): ICD-10-CM

## 2023-03-23 RX ORDER — FAMOTIDINE 10 MG
10 TABLET ORAL DAILY
Qty: 90 TABLET | Refills: 3 | Status: SHIPPED | OUTPATIENT
Start: 2023-03-23

## 2023-03-23 NOTE — PATIENT INSTRUCTIONS
Hyperkalemia   WHAT YOU NEED TO KNOW:   Hyperkalemia is a high level of potassium in your blood  Potassium helps control how your muscles, heart, and digestive system work  DISCHARGE INSTRUCTIONS:   Medicines:   Medicines  will be given to remove potassium from your body  This will lower your potassium levels  This medicine may be given as a pill or an enema  Take your medicine as directed  Contact your healthcare provider if you think your medicine is not helping or if you have side effects  Tell your provider if you are allergic to any medicine  Keep a list of the medicines, vitamins, and herbs you take  Include the amounts, and when and why you take them  Bring the list or the pill bottles to follow-up visits  Carry your medicine list with you in case of an emergency  Limit the amount of potassium you eat:  Foods that are high in potassium include bananas, tomatoes, oranges, turkey, and milk  Orange juice, citrus juices, and tomato juice are also high in potassium  Do not use salt substitutes  You may need to meet with a dietitian to help plan the best meals for you  Follow up with your healthcare provider as directed:  Write down your questions so you remember to ask them during your visits  Contact your healthcare provider if:   You have nausea or are vomiting  You have numbness or tingling in your arms or legs  Your symptoms do not go away or they get worse  You have questions or concerns about your condition or care  Return to the emergency department if:   You have trouble breathing  You have an irregular heartbeat  You have trouble controlling your muscles  You are too tired or weak to stand up  © Copyright [employer] 2022 Information is for End User's use only and may not be sold, redistributed or otherwise used for commercial purposes  The above information is an  only  It is not intended as medical advice for individual conditions or treatments   Talk to your doctor, nurse or pharmacist before following any medical regimen to see if it is safe and effective for you

## 2023-03-23 NOTE — PROGRESS NOTES
OFFICE FOLLOW UP - Nephrology   Kenzie Payne 30 y o  male MRN: [id number]    Encounter: [id number]        ASSESSMENT & PLAN    53-year-old male who originally presented with hematuria and proteinuria diagnosed biopsy-proven hereditary nephritis    1  CKD G4A3  o Etiology: Biopsy proven X linked hereditary nephritis, with minimal IgA nephropathy  o Urine: UPCR 3 79 grams of protein now up to 5 grams of protein  o Imagin US: RK 10 2 x 3 9 cm LK 11 5 cm tiny cysts no PVR  o Unfortunately progressively worsening creatinine increased from 2 1 from 2021, to 2 45 in 2021, to 2 6 in 2022 and 3 in 2022, 4 18 in 2023 with eGFR 17   o Plan:  Patient has an aggressive hereditary nephritis  We did try Ace inhibition however now with hyperkalemia     o Has had a transplant evaluation now awaiting listing and testing     o Has living donors  o Continue monthly blood work  o Ideally would like preemptive renal transplant  But may need a bridgeof pd to transplant    2  Electrolytes:  Hyperkalemia, Chronic metabolic acidosis  o Hyperkalemia-in the setting of progressive CKD we discussed a low potassium diet and plan fill was given to  K is improved to 5 2  o Non-anion gap metabolic acidosis-continue sodium bicarbonate  uptitrate as needed    3  Blood Pressure:  o Elevated office blood pressure-has not been checking his blood pressure at home  We are stopping lisinopril so will have him check his blood pressures at home if they are elevated above 135/90 will consider starting amlodipine    4  Hemoglobin:  o His hemoglobin and platelet counts are stable    5  Bone Mineral Disease-vitamin-D deficiency  o His PTH was normal it is vitamin D25 hydroxy was 27  o Will start vitamin D3 2000 units    6  Cardiovascular  o Blood pressure control  o Non diabetic  o Should have lipids drawn yearly    7   Nausea  o I do not think this is uremia related  o We will have him start Tums when he has an episode he does related to [place] sauce  o Also sent a prescription in for Pepcid to see if this helps    DISCUSSION:    Unfortunately Wilbert's renal disease is progressing  Will reach out to [facility] for additional next step for transplant evaluation  He also has living donors that want to be evaluated    Will check blood work monthly  Monitor home bp  Follow up in 3 months    HPI: Carolyne Akers is a 30 y o  male who is here for No chief complaint on file  Airam Spears is 30years old he has an uncle has male family member who are on dialysis and have been transplanted  Overall he is doing relatively well he denies any acute chest pain or shortness of breath no fevers or chills no nausea vomiting diarrhea or constipation no foamy or bloody urine  He is having some morning nausea watching his potassium intake  He has no chest pain or shortness of breath no fevers or chills      ROS:   Review of Systems   Constitutional: Negative  HENT: Negative  Eyes: Negative  Respiratory: Negative  Cardiovascular: Negative  Gastrointestinal: Positive for nausea  Endocrine: Negative  Genitourinary: Negative  Musculoskeletal: Negative  Allergic/Immunologic: Negative  Neurological: Negative  Hematological: Negative  Psychiatric/Behavioral: Negative  All other systems reviewed and are negative  Allergies: Patient has no known allergies      Medications:   Current Outpatient Medications:   •  cholecalciferol (VITAMIN D3) 1,000 units tablet, Take 2 tablets (2,000 Units total) by mouth daily, Disp: 90 tablet, Rfl: 3  •  famotidine (PEPCID) 10 mg tablet, Take 1 tablet (10 mg total) by mouth daily, Disp: 90 tablet, Rfl: 3  •  sodium bicarbonate 650 mg tablet, Take 2 tablets (1,300 mg total) by mouth 2 (two) times a day, Disp: 360 tablet, Rfl: 3    Past Medical History:   Diagnosis Date   • Hematuria    • Proteinuria      Past Surgical History:   Procedure Laterality Date   • CLAVICLE SURGERY     • CT NEEDLE BIOPSY KIDNEY  2/15/2019   • RENAL BIOPSY  2/19    X linked [place] syndrom diagnosis  Family History   Problem Relation Age of Onset   • Breast cancer Mother    • No Known Problems Father    • Kidney disease Maternal Uncle    • Kidney disease Maternal Grandfather       reports that he has never smoked  He has never used smokeless tobacco  He reports that he does not drink alcohol and does not use drugs  OBJECTIVE:    Vitals:    03/23/23 1358   BP: 144/82   BP Location: Left arm   Patient Position: Sitting   Cuff Size: Standard   Pulse: 65   Weight: 71 7 kg (158 lb)   Height: 5' 11" (1 803 m)        Body mass index is 22 04 kg/m²  [unfilled]     Weight (last 2 days)     Date/Time Weight    03/23/23 1358 71 7 (158)             Physical exam:  Physical Exam  Constitutional:       Appearance: He is normal weight  HENT:      Head: Normocephalic and atraumatic  Right Ear: External ear normal       Left Ear: External ear normal       Nose: Nose normal       Mouth/Throat:      Pharynx: Oropharynx is clear  Eyes:      Extraocular Movements: Extraocular movements intact  Conjunctiva/sclera: Conjunctivae normal       Pupils: Pupils are equal, round, and reactive to light  Cardiovascular:      Rate and Rhythm: Normal rate and regular rhythm  Pulses: Normal pulses  Heart sounds: Normal heart sounds  Pulmonary:      Effort: Pulmonary effort is normal       Breath sounds: Normal breath sounds  Abdominal:      General: Abdomen is flat  Bowel sounds are normal       Palpations: Abdomen is soft  Genitourinary:     Prostate: Normal    Musculoskeletal:         General: Normal range of motion  Cervical back: Normal range of motion  Skin:     General: Skin is warm and dry  Neurological:      General: No focal deficit present  Mental Status: He is alert and oriented to person, place, and time     Psychiatric:         Mood and Affect: Mood normal          Behavior: Behavior normal          Thought Content: Thought content normal          Judgment: Judgment normal          Lab Results:  Results from last 7 days   Lab Units 03/17/23  0728   WBC Thousand/uL 6 67   HEMOGLOBIN g/dL 11 4*   HEMATOCRIT % 34 3*   PLATELETS Thousands/uL 370   POTASSIUM mmol/L 5 2   CHLORIDE mmol/L 114*   CO2 mmol/L 19*   BUN mg/dL 63*   CREATININE mg/dL 4 18*   CALCIUM mg/dL 8 7   MAGNESIUM mg/dL 3 0*   PHOSPHORUS mg/dL 4 4       Portions of the record may have been created with voice recognition software  Occasional wrong word or "sound a like" substitutions may have occurred due to the inherent limitations of voice recognition software  Read the chart carefully and recognize, using context, where substitutions have occurred  If you have any questions, please contact the dictating provider

## 2023-03-29 ENCOUNTER — TELEPHONE (OUTPATIENT)
Dept: NEPHROLOGY | Facility: CLINIC | Age: 38
End: 2023-03-29

## 2023-03-29 NOTE — TELEPHONE ENCOUNTER
Spoke with Patient and schedule appointment for 7/27/23 with Dr Kaitlynn Bermudez in the [facility]

## 2023-05-16 ENCOUNTER — HOSPITAL ENCOUNTER (OUTPATIENT)
Dept: RADIOLOGY | Facility: HOSPITAL | Age: 38
Discharge: HOME | End: 2023-05-16
Attending: INTERNAL MEDICINE

## 2023-05-16 ENCOUNTER — HOSPITAL ENCOUNTER (OUTPATIENT)
Dept: NON INVASIVE DIAGNOSTICS | Facility: HOSPITAL | Age: 38
Discharge: HOME | End: 2023-05-16
Attending: INTERNAL MEDICINE

## 2023-05-16 VITALS
DIASTOLIC BLOOD PRESSURE: 82 MMHG | WEIGHT: 158 LBS | HEIGHT: 71 IN | HEART RATE: 55 BPM | BODY MASS INDEX: 22.12 KG/M2 | SYSTOLIC BLOOD PRESSURE: 144 MMHG

## 2023-05-16 DIAGNOSIS — N02.8 RECURRENT AND PERSISTENT HEMATURIA WITH OTHER MORPHOLOGIC CHANGES: ICD-10-CM

## 2023-05-16 DIAGNOSIS — R80.9 PROTEINURIA, UNSPECIFIED: ICD-10-CM

## 2023-05-16 DIAGNOSIS — R31.9 HEMATURIA, UNSPECIFIED: ICD-10-CM

## 2023-05-16 DIAGNOSIS — N07.8: ICD-10-CM

## 2023-05-16 DIAGNOSIS — R03.0 ELEVATED BLOOD-PRESSURE READING, WITHOUT DIAGNOSIS OF HYPERTENSION: ICD-10-CM

## 2023-05-16 DIAGNOSIS — N18.4 CHRONIC KIDNEY DISEASE, STAGE 4 (SEVERE) (HCC): ICD-10-CM

## 2023-05-16 LAB
AORTIC ROOT: 3.2 CM
APICAL FOUR CHAMBER EJECTION FRACTION: 69 %
ASCENDING AORTA: 3 CM
E WAVE DECELERATION TIME: 265 MS
FRACTIONAL SHORTENING: 37 % (ref 28–44)
INTERVENTRICULAR SEPTUM IN DIASTOLE (PARASTERNAL SHORT AXIS VIEW): 0.9 CM
INTERVENTRICULAR SEPTUM: 0.9 CM (ref 0.6–1.1)
LAAS-AP2: 21.2 CM2
LAAS-AP4: 22.7 CM2
LEFT ATRIUM SIZE: 3.4 CM
LEFT INTERNAL DIMENSION IN SYSTOLE: 3.2 CM (ref 2.1–4)
LEFT VENTRICULAR INTERNAL DIMENSION IN DIASTOLE: 5.1 CM (ref 3.5–6)
LEFT VENTRICULAR POSTERIOR WALL IN END DIASTOLE: 0.9 CM
LEFT VENTRICULAR STROKE VOLUME: 82 ML
LVSV (TEICH): 82 ML
MV E'TISSUE VEL-SEP: 15 CM/S
MV PEAK A VEL: 0.67 M/S
MV PEAK E VEL: 104 CM/S
MV STENOSIS PRESSURE HALF TIME: 77 MS
MV VALVE AREA P 1/2 METHOD: 2.86 CM2
RA PRESSURE ESTIMATED: 15 MMHG
RIGHT ATRIUM AREA SYSTOLE A4C: 23.6 CM2
RIGHT VENTRICLE ID DIMENSION: 4.1 CM
RV PSP: 38 MMHG
SL CV LEFT ATRIUM LENGTH A2C: 5.5 CM
SL CV LV EF: 65
SL CV PED ECHO LEFT VENTRICLE DIASTOLIC VOLUME (MOD BIPLANE) 2D: 125 ML
SL CV PED ECHO LEFT VENTRICLE SYSTOLIC VOLUME (MOD BIPLANE) 2D: 42 ML
TR MAX PG: 23 MMHG
TR PEAK VELOCITY: 2.4 M/S
TRICUSPID ANNULAR PLANE SYSTOLIC EXCURSION: 3.6 CM
TRICUSPID VALVE PEAK REGURGITATION VELOCITY: 2.58 M/S

## 2023-05-22 ENCOUNTER — TELEPHONE (OUTPATIENT)
Dept: NEPHROLOGY | Facility: CLINIC | Age: 38
End: 2023-05-22

## 2023-05-22 NOTE — TELEPHONE ENCOUNTER
----- Message from Van Crawford MD sent at 5/21/2023  6:33 PM EDT -----  Hello    Please let [facility] team know that the patient testing is starting to return  thus far no acute abnormalities that are significant are noted  There is mildly elevated RV pressure on echocardiogram which they can review with the patient  Please asked them for referral to cardiology team for transplant clearance evaluation  The coordinator team at [facility] can help set up      Thank you

## 2023-06-06 ENCOUNTER — TELEPHONE (OUTPATIENT)
Dept: OTHER | Facility: HOSPITAL | Age: 38
End: 2023-06-06

## 2023-06-06 ENCOUNTER — APPOINTMENT (OUTPATIENT)
Dept: LAB | Age: 38
End: 2023-06-06
Payer: COMMERCIAL

## 2023-06-06 DIAGNOSIS — R80.9 PROTEINURIA, UNSPECIFIED TYPE: ICD-10-CM

## 2023-06-06 DIAGNOSIS — N18.5 CKD (CHRONIC KIDNEY DISEASE) STAGE 5, GFR LESS THAN 15 ML/MIN (HCC): ICD-10-CM

## 2023-06-06 DIAGNOSIS — N18.4 CKD (CHRONIC KIDNEY DISEASE) STAGE 4, GFR 15-29 ML/MIN (HCC): ICD-10-CM

## 2023-06-06 DIAGNOSIS — N18.4 CKD (CHRONIC KIDNEY DISEASE) STAGE 4, GFR 15-29 ML/MIN (HCC): Primary | ICD-10-CM

## 2023-06-06 DIAGNOSIS — N02.8 IGA NEPHROPATHY: ICD-10-CM

## 2023-06-06 DIAGNOSIS — N02.9 IDIOPATHIC HEMATURIA, UNSPECIFIED WHETHER GLOMERULAR MORPHOLOGIC CHANGES PRESENT: ICD-10-CM

## 2023-06-06 DIAGNOSIS — N07.8 HEREDITARY NEPHRITIS: ICD-10-CM

## 2023-06-06 DIAGNOSIS — R03.0 ELEVATED BLOOD PRESSURE READING: ICD-10-CM

## 2023-06-06 LAB
ALBUMIN SERPL BCP-MCNC: 3.5 G/DL (ref 3.5–5)
ALP SERPL-CCNC: 63 U/L (ref 46–116)
ALT SERPL W P-5'-P-CCNC: 25 U/L (ref 12–78)
ANION GAP SERPL CALCULATED.3IONS-SCNC: 6 MMOL/L (ref 4–13)
AST SERPL W P-5'-P-CCNC: 13 U/L (ref 5–45)
BACTERIA UR QL AUTO: ABNORMAL /HPF
BILIRUB SERPL-MCNC: 1.23 MG/DL (ref 0.2–1)
BILIRUB UR QL STRIP: NEGATIVE
BUN SERPL-MCNC: 77 MG/DL (ref 5–25)
CALCIUM SERPL-MCNC: 9 MG/DL (ref 8.3–10.1)
CHLORIDE SERPL-SCNC: 112 MMOL/L (ref 96–108)
CLARITY UR: CLEAR
CO2 SERPL-SCNC: 19 MMOL/L (ref 21–32)
COLOR UR: ABNORMAL
CREAT SERPL-MCNC: 6.37 MG/DL (ref 0.6–1.3)
CREAT UR-MCNC: 91.9 MG/DL
ERYTHROCYTE [DISTWIDTH] IN BLOOD BY AUTOMATED COUNT: 12 % (ref 11.6–15.1)
FERRITIN SERPL-MCNC: 66 NG/ML (ref 24–336)
GFR SERPL CREATININE-BSD FRML MDRD: 10 ML/MIN/1.73SQ M
GLUCOSE P FAST SERPL-MCNC: 99 MG/DL (ref 65–99)
GLUCOSE UR STRIP-MCNC: NEGATIVE MG/DL
HCT VFR BLD AUTO: 31 % (ref 36.5–49.3)
HGB BLD-MCNC: 10.8 G/DL (ref 12–17)
HGB UR QL STRIP.AUTO: ABNORMAL
KETONES UR STRIP-MCNC: NEGATIVE MG/DL
LEUKOCYTE ESTERASE UR QL STRIP: NEGATIVE
MAGNESIUM SERPL-MCNC: 3.1 MG/DL (ref 1.6–2.6)
MCH RBC QN AUTO: 32.6 PG (ref 26.8–34.3)
MCHC RBC AUTO-ENTMCNC: 34.8 G/DL (ref 31.4–37.4)
MCV RBC AUTO: 94 FL (ref 82–98)
NITRITE UR QL STRIP: NEGATIVE
NON-SQ EPI CELLS URNS QL MICRO: ABNORMAL /HPF
PH UR STRIP.AUTO: 6 [PH]
PHOSPHATE SERPL-MCNC: 5 MG/DL (ref 2.7–4.5)
PLATELET # BLD AUTO: 341 THOUSANDS/UL (ref 149–390)
PMV BLD AUTO: 11.2 FL (ref 8.9–12.7)
POTASSIUM SERPL-SCNC: 4.9 MMOL/L (ref 3.5–5.3)
PROT SERPL-MCNC: 6.8 G/DL (ref 6.4–8.4)
PROT UR STRIP-MCNC: ABNORMAL MG/DL
PROT UR-MCNC: 473 MG/DL
PROT/CREAT UR: 5.15 MG/G{CREAT} (ref 0–0.1)
PTH-INTACT SERPL-MCNC: 104.7 PG/ML (ref 12–88)
RBC # BLD AUTO: 3.31 MILLION/UL (ref 3.88–5.62)
RBC #/AREA URNS AUTO: ABNORMAL /HPF
SODIUM SERPL-SCNC: 137 MMOL/L (ref 135–147)
SP GR UR STRIP.AUTO: 1.01 (ref 1–1.03)
URATE SERPL-MCNC: 7 MG/DL (ref 3.5–8.5)
UROBILINOGEN UR STRIP-ACNC: <2 MG/DL
WBC # BLD AUTO: 6.42 THOUSAND/UL (ref 4.31–10.16)
WBC #/AREA URNS AUTO: ABNORMAL /HPF

## 2023-06-06 PROCEDURE — 84156 ASSAY OF PROTEIN URINE: CPT

## 2023-06-06 PROCEDURE — 81001 URINALYSIS AUTO W/SCOPE: CPT

## 2023-06-06 PROCEDURE — 80053 COMPREHEN METABOLIC PANEL: CPT

## 2023-06-06 PROCEDURE — 36415 COLL VENOUS BLD VENIPUNCTURE: CPT

## 2023-06-06 PROCEDURE — 85027 COMPLETE CBC AUTOMATED: CPT

## 2023-06-06 PROCEDURE — 82570 ASSAY OF URINE CREATININE: CPT

## 2023-06-06 PROCEDURE — 84100 ASSAY OF PHOSPHORUS: CPT

## 2023-06-06 PROCEDURE — 83735 ASSAY OF MAGNESIUM: CPT

## 2023-06-06 PROCEDURE — 83970 ASSAY OF PARATHORMONE: CPT

## 2023-06-06 PROCEDURE — 84550 ASSAY OF BLOOD/URIC ACID: CPT

## 2023-06-06 PROCEDURE — 82728 ASSAY OF FERRITIN: CPT

## 2023-06-06 NOTE — TELEPHONE ENCOUNTER
Spoke to patient, creatinine up to 6, having some episodes of nausea and vomiting, having increased lethargy as well which are all likely signs of uremia    Plan:    1  We will ask Dr Monroe Bruno to evaluate patient and place PD catheter can leave unroofed  2   We will start admissions process at home HD unit at [facility]  3  Continue current medications and monthly lab draws  4  I discussed with him that if his symptomatology worsens he he should call me or come directly to the emergency room   5    His current electrolytes have improved potassium is stable, bicarb is slightly low we will continue sodium bicarbonate tablets

## 2023-06-08 ENCOUNTER — TELEPHONE (OUTPATIENT)
Dept: NEPHROLOGY | Facility: CLINIC | Age: 38
End: 2023-06-08

## 2023-06-08 DIAGNOSIS — Z01.818 PRE-TRANSPLANT EVALUATION FOR CKD (CHRONIC KIDNEY DISEASE): Primary | ICD-10-CM

## 2023-06-08 DIAGNOSIS — N19 UREMIA: Primary | ICD-10-CM

## 2023-06-08 NOTE — TELEPHONE ENCOUNTER
Patient called stating he is ok with going through with the procedure on 6/15 and 6/16  Patient is requesting to speak directly with Dr Bertrand Senior regarding a few questions about the procedure  Patient did not go into detail  Please advise

## 2023-06-08 NOTE — TELEPHONE ENCOUNTER
I spoke with him  Spoke with interventional radiology as well  Given his symptoms will expedite PD catheter placement  He will be directly admitted on 6/15 for PD catheter placement on 6/16 and will be an urgent start for PD  He was in agreement with this plan and notified rounding nephrologist next week as well as charge nurse    ADT order was placed and spoke to PACS as well

## 2023-06-08 NOTE — PROGRESS NOTES
Spoke to pt as our [address] [place] [address] coordinator reached out to pt and pt expressed concerns about timing of eval      I reviewed with pt that his PD planning and initiation of dialysis should go according to his and Dr Kaden Stephenson  From transplant perspective, he has not had any donors come forward as of yet but has potential donors  So, I advised that donors should come forward  Once he and donor complete the process, then further plans for surgery for transplant can be determined which will still be a few months a minimum away as he has not had donors evaluated yet and he has yet to complete process  He stated he better understood process  He is due to have cards clearance and we will help facilitate this appt  Orders placed and I spoke with our office team    I have verbally spoken with Dr Adrian Bonds who is already in the process of calling pt to discuss progression of kidney disease and plans for PD

## 2023-06-08 NOTE — TELEPHONE ENCOUNTER
----- Message from Kurt Duncan MD sent at 6/8/2023  9:54 AM EDT -----  Hello    Can we help pt get an expedited appt for cardiology clearance for renal transplant  Ideally this month or early next month  We are trying to get pt listed by end of July as he may have potential living donors and is approaching dialysis  Please let me know the date of the appointment  , order is in    Thanks    stephani

## 2023-06-08 NOTE — TELEPHONE ENCOUNTER
It looks like Dr Sherif Robins is directly admitting pt on 6/15 for PD placement given pt progression of CKD more rapidly  I apologize for this, but can we see if Cardiology can move appt to end of June or early July       Thank you

## 2023-06-14 PROBLEM — N18.6 ESRD NEEDING DIALYSIS (HCC): Status: ACTIVE | Noted: 2023-06-14

## 2023-06-14 PROBLEM — N18.9 CHRONIC KIDNEY DISEASE-MINERAL AND BONE DISORDER: Status: ACTIVE | Noted: 2023-06-14

## 2023-06-14 PROBLEM — E83.9 CHRONIC KIDNEY DISEASE-MINERAL AND BONE DISORDER: Status: ACTIVE | Noted: 2023-06-14

## 2023-06-14 PROBLEM — Z99.2 ESRD NEEDING DIALYSIS (HCC): Status: ACTIVE | Noted: 2023-06-14

## 2023-06-14 PROBLEM — Q87.81 ALPORT SYNDROME: Status: ACTIVE | Noted: 2023-06-14

## 2023-06-14 PROBLEM — M89.9 CHRONIC KIDNEY DISEASE-MINERAL AND BONE DISORDER: Status: ACTIVE | Noted: 2023-06-14

## 2023-06-14 NOTE — ASSESSMENT & PLAN NOTE
· Biopsy proven, follows with Dr Jesse Stern   · Undergoing transplant eval at [facility]

## 2023-06-14 NOTE — ASSESSMENT & PLAN NOTE
· Direct admit 6/15 for pre op clearance and PD catheter placement (6/16 with IR) for urgent start PD given Cr up to 6 and now with intermittent n/v and lethargy indicative of uremic symptoms  · Nephrology consulted  · Consult IR for PD catheter placement  NPO at [facility] 6/16  Dr Brian Medina aware of patient    · Daily bmp  · Continue bicarb supplementation 1300 mg bid  · Renal diet

## 2023-06-15 ENCOUNTER — HOSPITAL ENCOUNTER (INPATIENT)
Facility: HOSPITAL | Age: 38
LOS: 2 days | Discharge: HOME | DRG: 682 | End: 2023-06-17
Attending: INTERNAL MEDICINE | Admitting: INTERNAL MEDICINE
Payer: COMMERCIAL

## 2023-06-15 DIAGNOSIS — N18.6 ESRD NEEDING DIALYSIS (HCC): Primary | ICD-10-CM

## 2023-06-15 DIAGNOSIS — N18.5 CKD (CHRONIC KIDNEY DISEASE) STAGE 5, GFR LESS THAN 15 ML/MIN (HCC): ICD-10-CM

## 2023-06-15 DIAGNOSIS — Q87.81 ALPORT SYNDROME: ICD-10-CM

## 2023-06-15 DIAGNOSIS — Z99.2 ESRD NEEDING DIALYSIS (HCC): Primary | ICD-10-CM

## 2023-06-15 PROBLEM — Z01.818 PRE-OP EXAM: Status: ACTIVE | Noted: 2023-06-15

## 2023-06-15 PROCEDURE — 99223 1ST HOSP IP/OBS HIGH 75: CPT | Performed by: STUDENT IN AN ORGANIZED HEALTH CARE EDUCATION/TRAINING PROGRAM

## 2023-06-15 RX ORDER — FAMOTIDINE 20 MG/1
10 TABLET, FILM COATED ORAL DAILY
Status: DISCONTINUED | OUTPATIENT
Start: 2023-06-16 | End: 2023-06-15

## 2023-06-15 RX ORDER — ACETAMINOPHEN 325 MG/1
650 TABLET ORAL EVERY 6 HOURS PRN
Status: DISCONTINUED | OUTPATIENT
Start: 2023-06-15 | End: 2023-06-16 | Stop reason: SDUPTHER

## 2023-06-15 RX ORDER — FAMOTIDINE 20 MG/1
10 TABLET, FILM COATED ORAL DAILY
Status: DISCONTINUED | OUTPATIENT
Start: 2023-06-15 | End: 2023-06-17 | Stop reason: HOSPADM

## 2023-06-15 RX ORDER — SENNOSIDES 8.6 MG
1 TABLET ORAL DAILY
Status: DISCONTINUED | OUTPATIENT
Start: 2023-06-16 | End: 2023-06-17 | Stop reason: HOSPADM

## 2023-06-15 RX ORDER — ONDANSETRON 2 MG/ML
4 INJECTION INTRAMUSCULAR; INTRAVENOUS EVERY 6 HOURS PRN
Status: DISCONTINUED | OUTPATIENT
Start: 2023-06-15 | End: 2023-06-17 | Stop reason: HOSPADM

## 2023-06-15 RX ORDER — SODIUM BICARBONATE 650 MG/1
1300 TABLET ORAL 2 TIMES DAILY
Status: DISCONTINUED | OUTPATIENT
Start: 2023-06-15 | End: 2023-06-15

## 2023-06-15 RX ORDER — MELATONIN
2000 DAILY
Status: DISCONTINUED | OUTPATIENT
Start: 2023-06-15 | End: 2023-06-17 | Stop reason: HOSPADM

## 2023-06-15 RX ORDER — SODIUM BICARBONATE 650 MG/1
1300 TABLET ORAL 2 TIMES DAILY
Status: DISCONTINUED | OUTPATIENT
Start: 2023-06-15 | End: 2023-06-17 | Stop reason: HOSPADM

## 2023-06-15 RX ORDER — MAGNESIUM HYDROXIDE/ALUMINUM HYDROXICE/SIMETHICONE 120; 1200; 1200 MG/30ML; MG/30ML; MG/30ML
30 SUSPENSION ORAL EVERY 6 HOURS PRN
Status: DISCONTINUED | OUTPATIENT
Start: 2023-06-15 | End: 2023-06-17 | Stop reason: HOSPADM

## 2023-06-15 RX ORDER — MELATONIN
2000 DAILY
Status: DISCONTINUED | OUTPATIENT
Start: 2023-06-16 | End: 2023-06-15

## 2023-06-15 RX ORDER — DOCUSATE SODIUM 100 MG/1
100 CAPSULE, LIQUID FILLED ORAL 2 TIMES DAILY
Status: DISCONTINUED | OUTPATIENT
Start: 2023-06-15 | End: 2023-06-17 | Stop reason: HOSPADM

## 2023-06-15 RX ADMIN — DOCUSATE SODIUM 100 MG: 100 CAPSULE, LIQUID FILLED ORAL at 20:50

## 2023-06-15 RX ADMIN — FAMOTIDINE 10 MG: 20 TABLET, FILM COATED ORAL at 20:46

## 2023-06-15 RX ADMIN — SODIUM BICARBONATE 650 MG TABLET 1300 MG: at 20:46

## 2023-06-15 RX ADMIN — Medication 2000 UNITS: at 20:46

## 2023-06-15 NOTE — ASSESSMENT & PLAN NOTE
· Patient is independent on his daily living activities  · Has tolerated anesthesia in the past very well without any anticipated side effects  · Denies being on any over-the-counter supplements  · METS above 4   · Patient scored one-point on RCRI score, making 6% chance for 30-day risk of mortality due to MI or cardiac arrest   · Patient is at acceptable risk for low risk procedure

## 2023-06-15 NOTE — H&P
1425 [facility]  H&P  Name: Urmila Armstrong 30 y o  male I MRN: [id number]  Unit/Bed#: [facility] 825-01 I Date of Admission: 6/15/2023   Date of Service: 6/15/2023 I Hospital Day: 0      Assessment/Plan   * ESRD needing dialysis [facility])  Assessment & Plan  · Direct admit 6/15 for pre op clearance and PD catheter placement (6/16 with IR) for urgent start PD given Cr up to 6 and now with intermittent n/v and lethargy indicative of uremic symptoms  · Nephrology consulted  · Consult IR for PD catheter placement  NPO at [facility] 6/16  Dr Christiano Rajput aware of patient  · Daily bmp  · Continue bicarb supplementation 1300 mg bid  · Renal diet    Pre-op exam  Assessment & Plan  · Patient is independent on his daily living activities  · Has tolerated anesthesia in the past very well without any anticipated side effects  · Denies being on any over-the-counter supplements  · METS above 4   · Patient scored one-point on RCRI score, making 6% chance for 30-day risk of mortality due to MI or cardiac arrest   · Patient is at acceptable risk for low risk procedure  Chronic kidney disease-mineral and bone disorder  Assessment & Plan  · Continue Vitamin D supplementation  · Recent PTH wnl    Alport syndrome  Assessment & Plan  · Biopsy proven, follows with Dr Kelby Hunter   · Undergoing transplant eval at [place]       VTE Pharmacologic Prophylaxis: VTE Score: 1 Low Risk (Score 0-2) - Encourage Ambulation  Code Status: Level 1 - Full Code per patient  Discussion with family: Updated  (wife) at bedside  Anticipated Length of Stay: Patient will be admitted on an inpatient basis with an anticipated length of stay of greater than 2 midnights secondary to Urgent need for dialysis, IR consulted for PD catheter placement       Total Time Spent on Date of Encounter in care of patient: 75 minutes This time was spent on one or more of the following: performing physical exam; counseling and coordination of care; obtaining or reviewing history; documenting in the medical record; reviewing/ordering tests, medications or procedures; communicating with other healthcare professionals and discussing with patient's family/caregivers  Chief Complaint: As direct admit, end-stage renal disease will need peritoneal dialysis catheter placed  History of Present Illness:  Cain Bragg is a 30 y o  male with a PMH of biopsy-proven Alport disease, CKD that has now progressed to end-stage renal disease, is here as a direct admit for PD catheter placement by IR and preop clearance  According to patient he has family history of kidney disease  Has biopsy-proven Alport disease that has progressed from CKD to end-stage renal disease in few months  He follows with Dr Harvey Bearden in outpatient setting and is undergoing transplant eval at [facility]  At this time patient denies any active complaints, is independent on his daily living activities  Labs done few days back reviewed  Patient's vitals reviewed  Review of Systems:  Review of Systems   Respiratory: Positive for shortness of breath (Only when he climbs more than 6 flights  )  Negative for chest tightness  Cardiovascular: Negative for chest pain and palpitations  All other systems reviewed and are negative  Past Medical and Surgical History:   Past Medical History:   Diagnosis Date   • Hematuria    • Proteinuria        Past Surgical History:   Procedure Laterality Date   • CLAVICLE SURGERY     • CT NEEDLE BIOPSY KIDNEY  2/15/2019   • RENAL BIOPSY  2/19    X linked Alports syndrom diagnosis  Meds/Allergies:  Prior to Admission medications    Medication Sig Start Date End Date Taking?  Authorizing Provider   cholecalciferol (VITAMIN D3) 1,000 units tablet Take 2 tablets (2,000 Units total) by mouth daily 10/28/22  Yes Mateo Hahn,    sodium bicarbonate 650 mg tablet Take 2 tablets (1,300 mg total) by mouth 2 (two) times a day 10/28/22  Yes Brian Coley, DO "  famotidine (PEPCID) 10 mg tablet Take 1 tablet (10 mg total) by mouth daily 3/23/23   Nicki Beckham DO     I have reviewed home medications with patient personally  Allergies: No Known Allergies    Social History:  Marital Status: /Civil Union   Occupation:   Patient Pre-hospital Living Situation: Home  Patient Pre-hospital Level of Mobility: walks  Patient Pre-hospital Diet Restrictions: none  Substance Use History:   Social History     Substance and Sexual Activity   Alcohol Use Never     Social History     Tobacco Use   Smoking Status Never   Smokeless Tobacco Never     Social History     Substance and Sexual Activity   Drug Use Never       Family History:  Family History   Problem Relation Age of Onset   • Breast cancer Mother    • No Known Problems Father    • Kidney disease Maternal Uncle    • Kidney disease Maternal Grandfather        Physical Exam:     Vitals:   Blood Pressure: 143/82 (06/15/23 1820)  Pulse: 72 (06/15/23 1820)  Temperature: 98 4 °F (36 9 °C) (06/15/23 1820)  Respirations: 16 (06/15/23 1820)  Height: 5' 10 98\" (180 3 cm) (06/15/23 1813)  Weight - Scale: 71 7 kg (158 lb 1 1 oz) (06/15/23 1813)  SpO2: 97 % (06/15/23 1820)    Physical Exam  Constitutional:       Appearance: Normal appearance  HENT:      Head: Normocephalic and atraumatic  Nose: Nose normal       Mouth/Throat:      Mouth: Mucous membranes are moist       Pharynx: Oropharynx is clear  Eyes:      Conjunctiva/sclera: Conjunctivae normal       Pupils: Pupils are equal, round, and reactive to light  Cardiovascular:      Rate and Rhythm: Normal rate and regular rhythm  Pulses: Normal pulses  Heart sounds: Normal heart sounds  Pulmonary:      Effort: Pulmonary effort is normal       Comments: Faint crackles on right bases  Abdominal:      General: Abdomen is flat  Bowel sounds are normal    Musculoskeletal:         General: Normal range of motion  Skin:     General: Skin is warm and dry   " Neurological:      General: No focal deficit present  Mental Status: He is alert and oriented to person, place, and time  Psychiatric:         Mood and Affect: Mood normal          Behavior: Behavior normal           Additional Data:     Lab Results:                  Results from last 7 days   Lab Units 06/13/23  1400   HEMOGLOBIN A1C % 5 7*           Lines/Drains:  Invasive Devices     None                     Imaging: No pertinent imaging reviewed  No orders to display       EKG and Other Studies Reviewed on Admission:   · EKG: No EKG obtained  ** Please Note: This note has been constructed using a voice recognition system   **

## 2023-06-16 ENCOUNTER — ANESTHESIA EVENT (INPATIENT)
Dept: RADIOLOGY | Facility: HOSPITAL | Age: 38
End: 2023-06-16
Payer: COMMERCIAL

## 2023-06-16 ENCOUNTER — APPOINTMENT (INPATIENT)
Dept: RADIOLOGY | Facility: HOSPITAL | Age: 38
DRG: 682 | End: 2023-06-16
Payer: COMMERCIAL

## 2023-06-16 ENCOUNTER — ANESTHESIA (INPATIENT)
Dept: RADIOLOGY | Facility: HOSPITAL | Age: 38
End: 2023-06-16
Payer: COMMERCIAL

## 2023-06-16 PROBLEM — D64.9 ANEMIA: Status: ACTIVE | Noted: 2023-06-16

## 2023-06-16 LAB
ALBUMIN SERPL BCP-MCNC: 3 G/DL (ref 3.5–5)
ALP SERPL-CCNC: 54 U/L (ref 46–116)
ALT SERPL W P-5'-P-CCNC: 22 U/L (ref 12–78)
ANION GAP SERPL CALCULATED.3IONS-SCNC: 5 MMOL/L (ref 4–13)
AST SERPL W P-5'-P-CCNC: 12 U/L (ref 5–45)
BILIRUB SERPL-MCNC: 0.42 MG/DL (ref 0.2–1)
BUN SERPL-MCNC: 76 MG/DL (ref 5–25)
CALCIUM ALBUM COR SERPL-MCNC: 9.8 MG/DL (ref 8.3–10.1)
CALCIUM SERPL-MCNC: 9 MG/DL (ref 8.3–10.1)
CHLORIDE SERPL-SCNC: 118 MMOL/L (ref 96–108)
CO2 SERPL-SCNC: 18 MMOL/L (ref 21–32)
CREAT SERPL-MCNC: 5.92 MG/DL (ref 0.6–1.3)
ERYTHROCYTE [DISTWIDTH] IN BLOOD BY AUTOMATED COUNT: 12.1 % (ref 11.6–15.1)
GFR SERPL CREATININE-BSD FRML MDRD: 11 ML/MIN/1.73SQ M
GLUCOSE SERPL-MCNC: 96 MG/DL (ref 65–140)
HBV CORE AB SER QL: NORMAL
HBV CORE IGM SER QL: NORMAL
HBV SURFACE AB SER-ACNC: 161 MIU/ML
HBV SURFACE AG SER QL: NORMAL
HCT VFR BLD AUTO: 30.4 % (ref 36.5–49.3)
HCV AB SER QL: NORMAL
HGB BLD-MCNC: 10.2 G/DL (ref 12–17)
MCH RBC QN AUTO: 32.3 PG (ref 26.8–34.3)
MCHC RBC AUTO-ENTMCNC: 33.6 G/DL (ref 31.4–37.4)
MCV RBC AUTO: 96 FL (ref 82–98)
PLATELET # BLD AUTO: 287 THOUSANDS/UL (ref 149–390)
PMV BLD AUTO: 11.5 FL (ref 8.9–12.7)
POTASSIUM SERPL-SCNC: 4.7 MMOL/L (ref 3.5–5.3)
PROT SERPL-MCNC: 6 G/DL (ref 6.4–8.4)
RBC # BLD AUTO: 3.16 MILLION/UL (ref 3.88–5.62)
SODIUM SERPL-SCNC: 141 MMOL/L (ref 135–147)
WBC # BLD AUTO: 6.65 THOUSAND/UL (ref 4.31–10.16)

## 2023-06-16 PROCEDURE — 49418 INSERT TUN IP CATH PERC: CPT | Performed by: RADIOLOGY

## 2023-06-16 PROCEDURE — 49418 INSERT TUN IP CATH PERC: CPT

## 2023-06-16 PROCEDURE — 86704 HEP B CORE ANTIBODY TOTAL: CPT | Performed by: INTERNAL MEDICINE

## 2023-06-16 PROCEDURE — 86705 HEP B CORE ANTIBODY IGM: CPT | Performed by: INTERNAL MEDICINE

## 2023-06-16 PROCEDURE — 99254 IP/OBS CNSLTJ NEW/EST MOD 60: CPT | Performed by: INTERNAL MEDICINE

## 2023-06-16 PROCEDURE — 86803 HEPATITIS C AB TEST: CPT | Performed by: INTERNAL MEDICINE

## 2023-06-16 PROCEDURE — 0WHG33Z INSERTION OF INFUSION DEVICE INTO PERITONEAL CAVITY, PERCUTANEOUS APPROACH: ICD-10-PCS | Performed by: RADIOLOGY

## 2023-06-16 PROCEDURE — 3E1M39Z IRRIGATION OF PERITONEAL CAVITY USING DIALYSATE, PERCUTANEOUS APPROACH: ICD-10-PCS | Performed by: INTERNAL MEDICINE

## 2023-06-16 PROCEDURE — C1750 CATH, HEMODIALYSIS,LONG-TERM: HCPCS

## 2023-06-16 PROCEDURE — 80053 COMPREHEN METABOLIC PANEL: CPT | Performed by: STUDENT IN AN ORGANIZED HEALTH CARE EDUCATION/TRAINING PROGRAM

## 2023-06-16 PROCEDURE — 86706 HEP B SURFACE ANTIBODY: CPT | Performed by: INTERNAL MEDICINE

## 2023-06-16 PROCEDURE — NC001 PR NO CHARGE: Performed by: NURSE PRACTITIONER

## 2023-06-16 PROCEDURE — 99233 SBSQ HOSP IP/OBS HIGH 50: CPT | Performed by: GENERAL PRACTICE

## 2023-06-16 PROCEDURE — 85027 COMPLETE CBC AUTOMATED: CPT | Performed by: STUDENT IN AN ORGANIZED HEALTH CARE EDUCATION/TRAINING PROGRAM

## 2023-06-16 PROCEDURE — 87340 HEPATITIS B SURFACE AG IA: CPT | Performed by: INTERNAL MEDICINE

## 2023-06-16 RX ORDER — HYDROMORPHONE HCL/PF 1 MG/ML
0.5 SYRINGE (ML) INJECTION EVERY 2 HOUR PRN
Status: DISCONTINUED | OUTPATIENT
Start: 2023-06-16 | End: 2023-06-17 | Stop reason: HOSPADM

## 2023-06-16 RX ORDER — ROCURONIUM BROMIDE 10 MG/ML
INJECTION, SOLUTION INTRAVENOUS AS NEEDED
Status: DISCONTINUED | OUTPATIENT
Start: 2023-06-16 | End: 2023-06-16

## 2023-06-16 RX ORDER — DEXAMETHASONE SODIUM PHOSPHATE 10 MG/ML
INJECTION, SOLUTION INTRAMUSCULAR; INTRAVENOUS AS NEEDED
Status: DISCONTINUED | OUTPATIENT
Start: 2023-06-16 | End: 2023-06-16

## 2023-06-16 RX ORDER — SODIUM CHLORIDE 9 MG/ML
INJECTION, SOLUTION INTRAVENOUS CONTINUOUS PRN
Status: DISCONTINUED | OUTPATIENT
Start: 2023-06-16 | End: 2023-06-16

## 2023-06-16 RX ORDER — OXYCODONE HCL 5 MG/5 ML
10 SOLUTION, ORAL ORAL EVERY 4 HOURS PRN
Status: DISCONTINUED | OUTPATIENT
Start: 2023-06-16 | End: 2023-06-17 | Stop reason: HOSPADM

## 2023-06-16 RX ORDER — LIDOCAINE HYDROCHLORIDE AND EPINEPHRINE 10; 10 MG/ML; UG/ML
INJECTION, SOLUTION INFILTRATION; PERINEURAL AS NEEDED
Status: COMPLETED | OUTPATIENT
Start: 2023-06-16 | End: 2023-06-16

## 2023-06-16 RX ORDER — SODIUM CHLORIDE 9 MG/ML
75 INJECTION, SOLUTION INTRAVENOUS CONTINUOUS
Status: DISCONTINUED | OUTPATIENT
Start: 2023-06-16 | End: 2023-06-16

## 2023-06-16 RX ORDER — FENTANYL CITRATE/PF 50 MCG/ML
25 SYRINGE (ML) INJECTION
Status: DISCONTINUED | OUTPATIENT
Start: 2023-06-16 | End: 2023-06-17 | Stop reason: HOSPADM

## 2023-06-16 RX ORDER — FENTANYL CITRATE 50 UG/ML
INJECTION, SOLUTION INTRAMUSCULAR; INTRAVENOUS AS NEEDED
Status: DISCONTINUED | OUTPATIENT
Start: 2023-06-16 | End: 2023-06-16

## 2023-06-16 RX ORDER — CEFAZOLIN SODIUM 1 G/3ML
INJECTION, POWDER, FOR SOLUTION INTRAMUSCULAR; INTRAVENOUS AS NEEDED
Status: DISCONTINUED | OUTPATIENT
Start: 2023-06-16 | End: 2023-06-16

## 2023-06-16 RX ORDER — ONDANSETRON 2 MG/ML
INJECTION INTRAMUSCULAR; INTRAVENOUS AS NEEDED
Status: DISCONTINUED | OUTPATIENT
Start: 2023-06-16 | End: 2023-06-16

## 2023-06-16 RX ORDER — OXYCODONE HYDROCHLORIDE 5 MG/1
5 TABLET ORAL EVERY 4 HOURS PRN
Status: DISCONTINUED | OUTPATIENT
Start: 2023-06-16 | End: 2023-06-17 | Stop reason: HOSPADM

## 2023-06-16 RX ORDER — LIDOCAINE HYDROCHLORIDE 10 MG/ML
INJECTION, SOLUTION EPIDURAL; INFILTRATION; INTRACAUDAL; PERINEURAL AS NEEDED
Status: DISCONTINUED | OUTPATIENT
Start: 2023-06-16 | End: 2023-06-16

## 2023-06-16 RX ORDER — HYDROMORPHONE HCL/PF 1 MG/ML
SYRINGE (ML) INJECTION AS NEEDED
Status: DISCONTINUED | OUTPATIENT
Start: 2023-06-16 | End: 2023-06-16

## 2023-06-16 RX ORDER — NEOSTIGMINE METHYLSULFATE 1 MG/ML
INJECTION INTRAVENOUS AS NEEDED
Status: DISCONTINUED | OUTPATIENT
Start: 2023-06-16 | End: 2023-06-16

## 2023-06-16 RX ORDER — PROPOFOL 10 MG/ML
INJECTION, EMULSION INTRAVENOUS AS NEEDED
Status: DISCONTINUED | OUTPATIENT
Start: 2023-06-16 | End: 2023-06-16

## 2023-06-16 RX ORDER — ACETAMINOPHEN 325 MG/1
650 TABLET ORAL EVERY 4 HOURS PRN
Status: DISCONTINUED | OUTPATIENT
Start: 2023-06-16 | End: 2023-06-17 | Stop reason: HOSPADM

## 2023-06-16 RX ORDER — BUPIVACAINE HYDROCHLORIDE AND EPINEPHRINE 5; 5 MG/ML; UG/ML
INJECTION, SOLUTION PERINEURAL AS NEEDED
Status: COMPLETED | OUTPATIENT
Start: 2023-06-16 | End: 2023-06-16

## 2023-06-16 RX ORDER — ONDANSETRON 2 MG/ML
4 INJECTION INTRAMUSCULAR; INTRAVENOUS ONCE AS NEEDED
Status: DISCONTINUED | OUTPATIENT
Start: 2023-06-16 | End: 2023-06-17 | Stop reason: HOSPADM

## 2023-06-16 RX ORDER — MIDAZOLAM HYDROCHLORIDE 2 MG/2ML
INJECTION, SOLUTION INTRAMUSCULAR; INTRAVENOUS AS NEEDED
Status: DISCONTINUED | OUTPATIENT
Start: 2023-06-16 | End: 2023-06-16

## 2023-06-16 RX ORDER — GLYCOPYRROLATE 0.2 MG/ML
INJECTION INTRAMUSCULAR; INTRAVENOUS AS NEEDED
Status: DISCONTINUED | OUTPATIENT
Start: 2023-06-16 | End: 2023-06-16

## 2023-06-16 RX ADMIN — ONDANSETRON 4 MG: 2 INJECTION INTRAMUSCULAR; INTRAVENOUS at 14:23

## 2023-06-16 RX ADMIN — DOCUSATE SODIUM 100 MG: 100 CAPSULE, LIQUID FILLED ORAL at 19:50

## 2023-06-16 RX ADMIN — OXYCODONE HYDROCHLORIDE 5 MG: 5 TABLET ORAL at 17:17

## 2023-06-16 RX ADMIN — MIDAZOLAM 2 MG: 1 INJECTION INTRAMUSCULAR; INTRAVENOUS at 12:03

## 2023-06-16 RX ADMIN — LIDOCAINE HYDROCHLORIDE 20 MG: 10 INJECTION, SOLUTION EPIDURAL; INFILTRATION; INTRACAUDAL at 12:10

## 2023-06-16 RX ADMIN — IOHEXOL 120 ML: 300 INJECTION, SOLUTION INTRAVENOUS at 14:39

## 2023-06-16 RX ADMIN — BUPIVACAINE HYDROCHLORIDE AND EPINEPHRINE BITARTRATE 20 ML: 5; .005 INJECTION, SOLUTION PERINEURAL at 13:45

## 2023-06-16 RX ADMIN — DEXAMETHASONE SODIUM PHOSPHATE 5 MG: 10 INJECTION, SOLUTION INTRAMUSCULAR; INTRAVENOUS at 12:15

## 2023-06-16 RX ADMIN — PROPOFOL 20 MG: 10 INJECTION, EMULSION INTRAVENOUS at 12:14

## 2023-06-16 RX ADMIN — NEOSTIGMINE METHYLSULFATE 3 MG: 1 INJECTION INTRAVENOUS at 14:47

## 2023-06-16 RX ADMIN — CEFAZOLIN 1000 MG: 1 INJECTION, POWDER, FOR SOLUTION INTRAMUSCULAR; INTRAVENOUS at 12:34

## 2023-06-16 RX ADMIN — PROPOFOL 50 MG: 10 INJECTION, EMULSION INTRAVENOUS at 12:12

## 2023-06-16 RX ADMIN — ACETAMINOPHEN 650 MG: 325 TABLET, FILM COATED ORAL at 21:31

## 2023-06-16 RX ADMIN — SENNOSIDES 8.6 MG: 8.6 TABLET, FILM COATED ORAL at 19:50

## 2023-06-16 RX ADMIN — PROPOFOL 50 MG: 10 INJECTION, EMULSION INTRAVENOUS at 12:10

## 2023-06-16 RX ADMIN — LIDOCAINE HYDROCHLORIDE 10 MG: 10 INJECTION, SOLUTION EPIDURAL; INFILTRATION; INTRACAUDAL at 12:11

## 2023-06-16 RX ADMIN — SODIUM CHLORIDE: 0.9 INJECTION, SOLUTION INTRAVENOUS at 12:03

## 2023-06-16 RX ADMIN — ROCURONIUM BROMIDE 50 MG: 10 INJECTION, SOLUTION INTRAVENOUS at 12:12

## 2023-06-16 RX ADMIN — SODIUM CHLORIDE 75 ML/HR: 0.9 INJECTION, SOLUTION INTRAVENOUS at 15:51

## 2023-06-16 RX ADMIN — SODIUM BICARBONATE 650 MG TABLET 1300 MG: at 19:50

## 2023-06-16 RX ADMIN — LIDOCAINE HYDROCHLORIDE,EPINEPHRINE BITARTRATE 20 ML: 10; .01 INJECTION, SOLUTION INFILTRATION; PERINEURAL at 12:55

## 2023-06-16 RX ADMIN — Medication 2000 UNITS: at 19:50

## 2023-06-16 RX ADMIN — FENTANYL CITRATE 100 MCG: 50 INJECTION, SOLUTION INTRAMUSCULAR; INTRAVENOUS at 12:10

## 2023-06-16 RX ADMIN — HYDROMORPHONE HYDROCHLORIDE 0.5 MG: 1 INJECTION, SOLUTION INTRAMUSCULAR; INTRAVENOUS; SUBCUTANEOUS at 13:07

## 2023-06-16 RX ADMIN — LIDOCAINE HYDROCHLORIDE 20 MG: 10 INJECTION, SOLUTION EPIDURAL; INFILTRATION; INTRACAUDAL at 12:12

## 2023-06-16 RX ADMIN — GLYCOPYRROLATE 0.6 MG: 0.2 INJECTION, SOLUTION INTRAMUSCULAR; INTRAVENOUS at 14:47

## 2023-06-16 RX ADMIN — Medication 10 MG: at 13:32

## 2023-06-16 RX ADMIN — PROPOFOL 30 MG: 10 INJECTION, EMULSION INTRAVENOUS at 12:11

## 2023-06-16 RX ADMIN — ONDANSETRON 4 MG: 2 INJECTION INTRAMUSCULAR; INTRAVENOUS at 20:07

## 2023-06-16 RX ADMIN — ACETAMINOPHEN 650 MG: 325 TABLET, FILM COATED ORAL at 17:17

## 2023-06-16 RX ADMIN — FENTANYL CITRATE 25 MCG: 50 INJECTION, SOLUTION INTRAMUSCULAR; INTRAVENOUS at 15:43

## 2023-06-16 NOTE — ASSESSMENT & PLAN NOTE
· Direct admit 6/15 for pre op clearance and PD catheter placement (6/16 with IR) for urgent start PD given Cr up to 6 and now with intermittent n/v and lethargy indicative of uremic symptoms  · Nephrology appreciated  · IR placed PD catheter today - will start PD today  · Daily bmp  · Continue bicarb supplementation

## 2023-06-16 NOTE — UTILIZATION REVIEW
Initial Clinical Review    Admission: Date/Time/Statement:   Admission Orders (From admission, onward)     Ordered        06/15/23 1859  Inpatient Admission  Once                      Orders Placed This Encounter   Procedures   • Inpatient Admission     Standing Status:   Standing     Number of Occurrences:   1     Order Specific Question:   Level of Care     Answer:   Med Surg [16]     Order Specific Question:   Estimated length of stay     Answer:   More than 2 Midnights     Order Specific Question:   Certification     Answer:   I certify that inpatient services are medically necessary for this patient for a duration of greater than two midnights  See H&P and MD Progress Notes for additional information about the patient's course of treatment  Initial Presentation: 30 y o  male  with a PMH of biopsy-proven Alport disease, CKD that has now progressed to ESRD, presented to [facility] a direct admit for PD catheter placement and initiation of peritoneal dialysis  Has biopsy-proven Alport disease that has progressed from CKD to end-stage renal disease in few months  He follows with Dr Dejah Haider in outpatient setting and is undergoing transplant eval at [facility]  Plan:  med surg, nephrology consult, IR consult for PD catheter placement, NPO after MN, daily bmp, continue bicarb supplement, renal diet  Date: 6/16   Day 2: Per Nephrology:  Progressive worsening of kidney disease and development of mild uremia  Pt needs IV iron, monitor hgb  Continue sodium bicarbonate with possible stop after initiation of PD      6/16 Per IR: Keep NPO for PD catheter placement       Triage Vitals   Temperature Pulse Respirations Blood Pressure SpO2   06/15/23 1820 06/15/23 1820 06/15/23 1820 06/15/23 1820 06/15/23 1820   98 4 °F (36 9 °C) 72 16 143/82 97 %      Temp src Heart Rate Source Patient Position - Orthostatic VS BP Location FiO2 (%)   -- -- -- -- --             Pain Score       06/15/23 1813       No Pain Wt Readings from Last 1 Encounters:   06/15/23 71 7 kg (158 lb 1 1 oz)     Additional Vital Signs:   Date/Time Temp Pulse Resp BP MAP (mmHg) SpO2   06/16/23 07:28:03 97 9 °F (36 6 °C) 55 -- 142/92 109 100 %   06/15/23 22:47:11 98 °F (36 7 °C) 49 Abnormal  18 135/74 94 97 %   06/15/23 22:46:54 98 °F (36 7 °C) 61 19 -- -- 97 %   06/15/23 18:20:05 98 4 °F (36 9 °C) 72 16 143/82 102 97 %     Pertinent Labs/Diagnostic Test Results:   IR peritoneal dialysis catheter placement    (Results Pending)         Results from last 7 days   Lab Units 06/16/23  0624   WBC Thousand/uL 6 65   HEMOGLOBIN g/dL 10 2*   HEMATOCRIT % 30 4*   PLATELETS Thousands/uL 287         Results from last 7 days   Lab Units 06/16/23  0624   SODIUM mmol/L 141   POTASSIUM mmol/L 4 7   CHLORIDE mmol/L 118*   CO2 mmol/L 18*   ANION GAP mmol/L 5   BUN mg/dL 76*   CREATININE mg/dL 5 92*   EGFR ml/min/1 73sq m 11   CALCIUM mg/dL 9 0     Results from last 7 days   Lab Units 06/16/23  0624   AST U/L 12   ALT U/L 22   ALK PHOS U/L 54   TOTAL PROTEIN g/dL 6 0*   ALBUMIN g/dL 3 0*   TOTAL BILIRUBIN mg/dL 0 42         Results from last 7 days   Lab Units 06/16/23  0624   GLUCOSE RANDOM mg/dL 96         Results from last 7 days   Lab Units 06/13/23  1400   HEMOGLOBIN A1C % 5 7*   EAG mg/dL 117           Past Medical History:   Diagnosis Date   • Hematuria    • Proteinuria      Present on Admission:  **None**      Admitting Diagnosis: Uremia [N19]  Age/Sex: 30 y o  male  Admission Orders:  Scheduled Medications:  cholecalciferol, 2,000 Units, Oral, Daily  docusate sodium, 100 mg, Oral, BID  famotidine, 10 mg, Oral, Daily  senna, 1 tablet, Oral, Daily  sodium bicarbonate, 1,300 mg, Oral, BID      Continuous IV Infusions: none     PRN Meds:  acetaminophen, 650 mg, Oral, Q6H PRN  aluminum-magnesium hydroxide-simethicone, 30 mL, Oral, Q6H PRN  ondansetron, 4 mg, Intravenous, Q6H PRN        INPATIENT CONSULT TO IR  IP CONSULT TO NEPHROLOGY    Network Utilization Review Department  ATTENTION: Please call with any questions or concerns to [phone] and carefully listen to the prompts so that you are directed to the right person  All voicemails are confidential   Hai Au all requests for admission clinical reviews, approved or denied determinations and any other requests to dedicated fax number below belonging to the campus where the patient is receiving treatment   List of dedicated fax numbers for the Facilities:  [address] DENIALS (Administrative/Medical Necessity) [phone]   [address] (Maternity/NICU/Pediatrics) [phone]   5 Danni Pérez [phone]   [facility] [address] [phone]   [address] [address] David Pickett 28 [phone]   1559 [facility] [place] [facility] [address] 815 [facility] [phone]

## 2023-06-16 NOTE — PLAN OF CARE
Problem: PAIN - ADULT  Goal: Verbalizes/displays adequate comfort level or baseline comfort level  Description: Interventions:  - Encourage patient to monitor pain and request assistance  - Assess pain using appropriate pain scale  - Administer analgesics based on type and severity of pain and evaluate response  - Implement non-pharmacological measures as appropriate and evaluate response  - Consider cultural and social influences on pain and pain management  - Notify physician/advanced practitioner if interventions unsuccessful or patient reports new pain  Outcome: Progressing     Problem: INFECTION - ADULT  Goal: Absence or prevention of progression during hospitalization  Description: INTERVENTIONS:  - Assess and monitor for signs and symptoms of infection  - Monitor lab/diagnostic results  - Monitor all insertion sites, i e  indwelling lines, tubes, and drains  - Monitor endotracheal if appropriate and nasal secretions for changes in amount and color  - [place] appropriate cooling/warming therapies per order  - Administer medications as ordered  - Instruct and encourage patient and family to use good hand hygiene technique  - Identify and instruct in appropriate isolation precautions for identified infection/condition  Outcome: Progressing  Goal: Absence of fever/infection during neutropenic period  Description: INTERVENTIONS:  - Monitor WBC    Outcome: Progressing     Problem: SAFETY ADULT  Goal: Patient will remain free of falls  Description: INTERVENTIONS:  - Educate patient/family on patient safety including physical limitations  - Instruct patient to call for assistance with activity   - Consult OT/PT to assist with strengthening/mobility   - Keep Call bell within reach  - Keep bed low and locked with side rails adjusted as appropriate  - Keep care items and personal belongings within reach  - Initiate and maintain comfort rounds  - Make Fall Risk Sign visible to staff  - Apply yellow socks and bracelet for high fall risk patients  - Consider moving patient to room near nurses station  Outcome: Progressing  Goal: Maintain or return to baseline ADL function  Description: INTERVENTIONS:  -  Assess patient's ability to carry out ADLs; assess patient's baseline for ADL function and identify physical deficits which impact ability to perform ADLs (bathing, care of mouth/teeth, toileting, grooming, dressing, etc )  - Assess/evaluate cause of self-care deficits   - Assess range of motion  - Assess patient's mobility; develop plan if impaired  - Assess patient's need for assistive devices and provide as appropriate  - Encourage maximum independence but intervene and supervise when necessary  - Involve family in performance of ADLs  - Assess for home care needs following discharge   - Consider OT consult to assist with ADL evaluation and planning for discharge  - Provide patient education as appropriate  Outcome: Progressing  Goal: Maintains/Returns to pre admission functional level  Description: INTERVENTIONS:  - Perform BMAT or MOVE assessment daily    - Set and communicate daily mobility goal to care team and patient/family/caregiver     - Collaborate with rehabilitation services on mobility goals if consulted  - Out of bed for toileting  - Record patient progress and toleration of activity level   Outcome: Progressing     Problem: DISCHARGE PLANNING  Goal: Discharge to home or other facility with appropriate resources  Description: INTERVENTIONS:  - Identify barriers to discharge w/patient and caregiver  - Arrange for needed discharge resources and transportation as appropriate  - Identify discharge learning needs (meds, wound care, etc )  - Arrange for interpretive services to assist at discharge as needed  - Refer to Case Management Department for coordinating discharge planning if the patient needs post-hospital services based on physician/advanced practitioner order or complex needs related to functional status, cognitive ability, or social support system  Outcome: Progressing

## 2023-06-16 NOTE — UTILIZATION REVIEW
"NOTIFICATION OF INPATIENT ADMISSION   AUTHORIZATION REQUEST   SERVICING FACILITY:   [facility]  Address: [address] [place] [address]  Tax ID: [id number]  NPI: [id number] ATTENDING PROVIDER:  Attending Name and NPI#: Caitlin London [[id number]]  Address: [address] [place] [address]  Phone: [phone]   ADMISSION INFORMATION:  [facility]patient [facility]  Place of Service Code: 21  Inpatient Admission Date/Time: 6/15/23  6:04 PM  Discharge Date/Time: No discharge date for patient encounter  Admitting Diagnosis Code/Description:  Uremia [N19]     UTILIZATION REVIEW CONTACT:  Roxi Rodríguez"[facility], Utilization   Network Utilization Review Department  Phone: [phone]  Fax: [phone]  Email: [email or web address]  Contact for approvals/pending authorizations, clinical reviews, and discharge  PHYSICIAN ADVISORY SERVICES:  Medical Necessity Denial & Aqsb-ml-Jrvg Review  Phone: [phone]  Fax: [phone]  Email: [email or web address]         "

## 2023-06-16 NOTE — UTILIZATION REVIEW
Date: 6/16/23    Additional information requested by Iva at [facility]  Attempted to call, phone number incorrect  Per Nephrology:  Recent baseline around 4 7-6 with egfr less than 15  But he is esrd so we don't typically follow a baseline creatinine anymore  Network Utilization Review Department  ATTENTION: Please call with any questions or concerns to [phone] and carefully listen to the prompts so that you are directed to the right person  All voicemails are confidential   [facility] all requests for admission clinical reviews, approved or denied determinations and any other requests to dedicated fax number below belonging to the campus where the patient is receiving treatment   List of dedicated fax numbers for the Facilities:  [address] DENIALS (Administrative/Medical Necessity) [phone]   [address] (Maternity/NICU/Pediatrics) [phone]   2 Danni Pérez [phone]   [address] [address] David Pickett 28 [phone]   1551 [facility] Quique George [facility] 134 815 [facility] [phone]

## 2023-06-16 NOTE — CONSULTS
[address] [facility] [address] 30 y o  male MRN: [id number]  Unit/Bed#: PPHP 825-01 Encounter: [id number]    ASSESSMENT and PLAN:  Marsha Lunsford is a 30 y o  male who was admitted to [facility] for placement of PD catheter and initiation of peritoneal dialysis  1  ESRD  - Biopsy-proven X-linked hereditary nephritis, with minimal IgA nephropathy  - UPCR 4 to 5 g  - Now with progressive worsening of kidney disease and development of mild uremia  - Transplant evaluation ongoing at [facility]  - Per primary nephrologist Dr Suzie Cotton for placement of PD catheter with urgent start PD  - IR has been consulted for placement of PD catheter    2  HTN/Volume   - Medications: Blood pressure currently acceptable off antihypertensives  - Currently appears euvolemic on examination    3  Anemia   - Hemoglobin currently at goal  - Ferritin 69, iron saturation 26 04/2023  - He will need IV iron, can arrange as outpatient with peritoneal dialysis  - No indication to start MAG at this point, will initiate MAG once hemoglobin less than 10    4  Hyperkalemia  - Mild intermittent hyperkalemia  - Low potassium diet  - This will improve with ongoing peritoneal dialysis    5  Metabolic acidosis  - Continue sodium bicarbonate 1300 mg twice daily  - Hopefully after initiation of peritoneal dialysis we will be able to stop sodium bicarbonate    6  Bone Mineral Disease   - PTH currently at goal 104 on 06/06/2023  - Mild hyperphosphatemia, can start low-dose PhosLo once taking meals  - Continue cholecalciferol 2000 units daily  - No indication for calcitriol or Sensipar at this time    HISTORY OF PRESENT ILLNESS:  Requesting Physician: Elijah Sanchez DO  Reason for Consult: ESRD    Marsha Lunsford is a 30 y o  male who was admitted to [facility] for placement of PD catheter and initiation of peritoneal dialysis    He has X-linked hereditary nephritis with mild IgA nephropathy features and has been having progressive worsening of kidney function with mild uremic symptoms  He is finishing his meals  He denies dyspnea  He denies leg swelling  He complains of feeling tired and fatigued  PAST MEDICAL HISTORY:  Past Medical History:   Diagnosis Date   • Hematuria    • Proteinuria        PAST SURGICAL HISTORY:  Past Surgical History:   Procedure Laterality Date   • CLAVICLE SURGERY     • CT NEEDLE BIOPSY KIDNEY  2/15/2019   • RENAL BIOPSY  2/19    X linked Alports syndrom diagnosis  ALLERGIES:  No Known Allergies    SOCIAL HISTORY:  Social History     Substance and Sexual Activity   Alcohol Use Never     Social History     Substance and Sexual Activity   Drug Use Never     Social History     Tobacco Use   Smoking Status Never   Smokeless Tobacco Never       FAMILY HISTORY:  Family History   Problem Relation Age of Onset   • Breast cancer Mother    • No Known Problems Father    • Kidney disease Maternal Uncle    • Kidney disease Maternal Grandfather        MEDICATIONS:    Current Facility-Administered Medications:   •  acetaminophen (TYLENOL) tablet 650 mg, 650 mg, Oral, Q6H PRN, Maurice Mi MD  •  aluminum-magnesium hydroxide-simethicone (MYLANTA) oral suspension 30 mL, 30 mL, Oral, Q6H PRN, Maurice Mi MD  •  cholecalciferol (VITAMIN D3) tablet 2,000 Units, 2,000 Units, Oral, Daily, Maurice Mi MD, 2,000 Units at 06/15/23 2046  •  docusate sodium (COLACE) capsule 100 mg, 100 mg, Oral, BID, Maurice Mi MD, 100 mg at 06/15/23 2050  •  famotidine (PEPCID) tablet 10 mg, 10 mg, Oral, Daily, Maurice Mi MD, 10 mg at 06/15/23 2046  •  ondansetron (ZOFRAN) injection 4 mg, 4 mg, Intravenous, Q6H PRN, Maurice Mi MD  •  senna (SENOKOT) tablet 8 6 mg, 1 tablet, Oral, Daily, Maurice Mi MD  •  sodium bicarbonate tablet 1,300 mg, 1,300 mg, Oral, BID, Maurice Mi MD, 1,300 mg at 06/15/23 2046    REVIEW OF SYSTEMS:  Review of Systems   Constitutional: Positive for fatigue  Negative for chills and fever  HENT: Negative for ear pain and sore throat  Eyes: Negative for pain and visual disturbance  Respiratory: Negative for cough and shortness of breath  Cardiovascular: Negative for chest pain and palpitations  Gastrointestinal: Negative for abdominal pain and vomiting  Genitourinary: Negative for dysuria and hematuria  Musculoskeletal: Negative for arthralgias and back pain  Skin: Negative for color change and rash  Neurological: Negative for seizures and syncope  All other systems reviewed and are negative  PHYSICAL EXAM:  Current Weight: Weight - Scale: 71 7 kg (158 lb 1 1 oz)  First Weight: Weight - Scale: 71 7 kg (158 lb 1 1 oz)  Vitals:    06/15/23 1820 06/15/23 2246 06/15/23 2247 06/16/23 0728   BP: 143/82  135/74 142/92   Pulse: 72 61 (!) 49 55   Resp: 16 19 18    Temp: 98 4 °F (36 9 °C) 98 °F (36 7 °C) 98 °F (36 7 °C) 97 9 °F (36 6 °C)   SpO2: 97% 97% 97% 100%   Weight:       Height:         No intake or output data in the 24 hours ending 06/16/23 0737  Physical Exam  Constitutional:       Appearance: Normal appearance  HENT:      Head: Normocephalic and atraumatic  Mouth/Throat:      Mouth: Mucous membranes are moist       Pharynx: Oropharynx is clear  Cardiovascular:      Rate and Rhythm: Normal rate and regular rhythm  Pulses: Normal pulses  Heart sounds: Normal heart sounds  Pulmonary:      Effort: Pulmonary effort is normal       Breath sounds: Normal breath sounds  Abdominal:      General: Bowel sounds are normal       Palpations: Abdomen is soft  Musculoskeletal:         General: Normal range of motion  Right lower leg: No edema  Left lower leg: No edema  Skin:     General: Skin is warm and dry  Neurological:      General: No focal deficit present  Mental Status: He is alert and oriented to person, place, and time  Mental status is at baseline     Psychiatric:         Mood and Affect: Mood normal          Behavior: Behavior normal  "     Thought Content: Thought content normal          Judgment: Judgment normal        Lab Results:   Results from last 7 days   Lab Units 06/16/23  0624   WBC Thousand/uL 6 65   HEMOGLOBIN g/dL 10 2*   HEMATOCRIT % 30 4*   PLATELETS Thousands/uL 287     Portions of the record may have been created with voice recognition software  Occasional wrong word or \"sound a like\" substitutions may have occurred due to the inherent limitations of voice recognition software  Read the chart carefully and recognize, using context, where substitutions have occurred  If you have any questions, please contact the dictating provider      "

## 2023-06-16 NOTE — TELEMEDICINE
e-Consult (IPC)  - Interventional Radiology  Sunny Salinas 30 y o  male MRN: [id number]  [facility] Encounter: [id number]          Interventional Radiology has been consulted to evaluate Sunny Salinas    We were consulted by nephrology concerning this patient with uremia, CKD 5  Inpatient Consult to IR  Consult performed by: MATI Nettles  Consult ordered by: Papi Yoder DO        06/16/23    Assessment/Recommendation:     30year old male with ESRD, x-linked hereditary nephritis, now with uremic symptoms  Patient direct admitted on 6/15 for PD catheter placement on 6/16  Plan reviewed with patient by nephrology  - keep NPO  - plan for PD catheter placement  - appreciate anesthesia support     5-10 minutes, >50% of the total time devoted to medical consultative verbal/EMR discussion between providers  Written report will be generated in the EMR  Thank you for allowing Interventional Radiology to participate in the care of Sunny Salinas  Please don't hesitate to call or TigerText us with any questions       [address] [place] [address] Irina Ramirez

## 2023-06-16 NOTE — BRIEF OP NOTE (RAD/CATH)
INTERVENTIONAL RADIOLOGY PROCEDURE NOTE    Date: 6/16/2023    Procedure:      Preoperative diagnosis:   1  ESRD needing dialysis ([address] )    2  Alport syndrome       Postoperative diagnosis: Same  Surgeon: Lala Arguelles MD     Assistant: None  No qualified resident was available  Blood loss: minimal    Specimens: none    Findings: Successful peritoneal dialysis catheter placement  May use immediately for urgent start PD  Complications: None immediate      Anesthesia: general anesthesia

## 2023-06-16 NOTE — PROGRESS NOTES
[address] [facility]  Progress Note  Name: Orelia Gaucher  MRN: [id number]  Unit/Bed#: PPHP 825-01 I Date of Admission: 6/15/2023   Date of Service: 6/16/2023 I Hospital Day: 1    Assessment/Plan   * ESRD needing dialysis [facility])  Assessment & Plan  · Direct admit 6/15 for pre op clearance and PD catheter placement (6/16 with IR) for urgent start PD given Cr up to 6 and now with intermittent n/v and lethargy indicative of uremic symptoms  · Nephrology appreciated  · IR placed PD catheter today  · Daily bmp  · Continue bicarb supplementation    Anemia  Assessment & Plan  · Hgb very acceptable > 10     Chronic kidney disease-mineral and bone disorder  Assessment & Plan  · Continue Vitamin D supplementation  · Recent PTH wnl    Alport syndrome  Assessment & Plan  · Biopsy proven, follows with Dr Jesse Stern   · Undergoing transplant eval at [facility]           VTE Pharmacologic Prophylaxis: VTE Score: 1 Low Risk (Score 0-2) - Encourage Ambulation  Patient Centered Rounds: I performed bedside rounds with nursing staff today  Discussions with Specialists or Other Care Team Provider: renal    Education and Discussions with Family / Patient: Updated  (wife and sister) at bedside  Total Time Spent on Date of Encounter in care of patient: 45 minutes This time was spent on one or more of the following: performing physical exam; counseling and coordination of care; obtaining or reviewing history; documenting in the medical record; reviewing/ordering tests, medications or procedures; communicating with other healthcare professionals and discussing with patient's family/caregivers      Current Length of Stay: 1 day(s)  Current Patient Status: Inpatient   Certification Statement: The patient will continue to require additional inpatient hospital stay due to need to start PD  Discharge Plan: SOD C will take over tomorrow    Code Status: Level 1 - Full Code    Subjective:   No acute complaints    Objective:     Vitals:   Temp (24hrs), Av 3 °F (36 8 °C), Min:97 9 °F (36 6 °C), Max:98 8 °F (37 1 °C)    Temp:  [97 9 °F (36 6 °C)-98 8 °F (37 1 °C)] 98 3 °F (36 8 °C)  HR:  [49-82] 68  Resp:  [14-20] 14  BP: (135-163)/(72-92) 139/72  SpO2:  [94 %-100 %] 95 %  Body mass index is 22 06 kg/m²  Input and Output Summary (last 24 hours): Intake/Output Summary (Last 24 hours) at 2023 1722  Last data filed at 2023 1423  Gross per 24 hour   Intake 700 ml   Output --   Net 700 ml       Physical Exam:   Physical Exam  HENT:      Head: Normocephalic and atraumatic  Nose: Nose normal       Mouth/Throat:      Mouth: Mucous membranes are moist    Eyes:      Extraocular Movements: Extraocular movements intact  Conjunctiva/sclera: Conjunctivae normal    Cardiovascular:      Rate and Rhythm: Normal rate and regular rhythm  Pulmonary:      Effort: Pulmonary effort is normal       Breath sounds: Normal breath sounds  Abdominal:      General: Bowel sounds are normal       Palpations: Abdomen is soft  Musculoskeletal:         General: Normal range of motion  Cervical back: Normal range of motion and neck supple  Right lower leg: No edema  Left lower leg: No edema  Skin:     General: Skin is warm and dry  Neurological:      Mental Status: He is alert and oriented to person, place, and time           Additional Data:     Labs:  Results from last 7 days   Lab Units 23  0624   WBC Thousand/uL 6 65   HEMOGLOBIN g/dL 10 2*   HEMATOCRIT % 30 4*   PLATELETS Thousands/uL 287     Results from last 7 days   Lab Units 23  0624   SODIUM mmol/L 141   POTASSIUM mmol/L 4 7   CHLORIDE mmol/L 118*   CO2 mmol/L 18*   BUN mg/dL 76*   CREATININE mg/dL 5 92*   ANION GAP mmol/L 5   CALCIUM mg/dL 9 0   ALBUMIN g/dL 3 0*   TOTAL BILIRUBIN mg/dL 0 42   ALK PHOS U/L 54   ALT U/L 22   AST U/L 12   GLUCOSE RANDOM mg/dL 96             Results from last 7 days   Lab Units 06/13/23  1400   HEMOGLOBIN A1C % 5 7*           Lines/Drains:  Invasive Devices     Peripheral Intravenous Line  Duration           Peripheral IV 06/16/23 Dorsal (posterior); Right Wrist <1 day          Line  Duration           Peritoneal Dialysis Catheter Classic Curl Abdominal <1 day                      Imaging: Reviewed radiology reports from this admission including: procedure reports    Recent Cultures (last 7 days):         Last 24 Hours Medication List:   Current Facility-Administered Medications   Medication Dose Route Frequency Provider Last Rate   • acetaminophen  650 mg Oral Q4H PRN Lorenza Garcia MD     • aluminum-magnesium hydroxide-simethicone  30 mL Oral Q6H PRN Donnell Elder MD     • cholecalciferol  2,000 Units Oral Daily Donnell Elder MD     • docusate sodium  100 mg Oral BID Donnell Elder MD     • famotidine  10 mg Oral Daily Donnell Elder MD     • fentaNYL  25 mcg Intravenous Q5 Min PRN Nilson Suarez CRNA     • HYDROmorphone  0 5 mg Intravenous Q2H PRN Lorenza Garcia MD     • ondansetron  4 mg Intravenous Q6H PRN Donnell Elder MD     • ondansetron  4 mg Intravenous Once PRN Nilson Suarez CRNA     • oxyCODONE  5 mg Oral Q4H PRN Lorenza Garcia MD     • oxyCODONE  10 mg Oral Q4H PRN Lorenza Garcia MD     • senna  1 tablet Oral Daily Donnell Elder MD     • sodium bicarbonate  1,300 mg Oral BID Donnell Elder MD          Today, Patient Was Seen By: Shahnaz Ontiveros DO    **Please Note: This note may have been constructed using a voice recognition system  **

## 2023-06-16 NOTE — QUICK NOTE
Once cleared by IR to use PD catheter, we will do 2 cycles overnight (750 cc each cycle, 1 hr 15 minutes dwell time each cycle)  1 5% PD solution      · All exchanges are to be performed in supine position  · Have patient use bathroom before being starting to dwell  · The patient should remain supine while dwelling any fluid  · The patient needs to sit up or stand, he will have to be fully drained  · If he has cough, he will need a cough suppressant  · Advise the patient to avoid straining, if constipated, ask for a laxative  · Instruct patient to wear loose clothing    Addendum:  Given hospital policy for urgent start PD to be done on cycler only, we will use cycler with following prescription:    Total volume in ml: 1500   Therapy time in hours: 3   Fill Volume: 750   Last Fill Volume in ml 0   I DRAIN (initial drain) Alarm in ml (70% of previous day's last fill voume): 0   Last Manual Drain: No   UF TARGET in ml: 0   Alarm: Yes   Dextrose %: 1 5%   Note: PD cycler questions   Alternating soln: No   Note: PD Cycler   Heparin (Units/L): No   Note: PD cycler questions   Last Fill No

## 2023-06-16 NOTE — ANESTHESIA POSTPROCEDURE EVALUATION
Post-Op Assessment Note    CV Status:  Stable  Pain Score: 0    Pain management: adequate     Mental Status:  Alert   Hydration Status:  Euvolemic   PONV Controlled:  None   Airway Patency:  Patent      Post Op Vitals Reviewed: Yes      Staff: CRNA         There were no known notable events for this encounter      BP   163/81   Temp   98 6   Pulse  86   Resp   17   SpO2   99

## 2023-06-16 NOTE — SEDATION DOCUMENTATION
Peritoneal dialysis catheter placement completed by Dr Dary Purcell without complications  Tolerated well  Transported via stretcher to PACU by CRNA and IR RN  Report given at bedside to PACU RN

## 2023-06-17 VITALS
DIASTOLIC BLOOD PRESSURE: 98 MMHG | HEIGHT: 71 IN | OXYGEN SATURATION: 99 % | WEIGHT: 158.07 LBS | SYSTOLIC BLOOD PRESSURE: 152 MMHG | HEART RATE: 49 BPM | RESPIRATION RATE: 16 BRPM | TEMPERATURE: 97.8 F | BODY MASS INDEX: 22.13 KG/M2

## 2023-06-17 PROBLEM — Z01.818 PRE-OP EXAM: Status: RESOLVED | Noted: 2023-06-15 | Resolved: 2023-06-17

## 2023-06-17 LAB
ALBUMIN SERPL BCP-MCNC: 2.7 G/DL (ref 3.5–5)
ANION GAP SERPL CALCULATED.3IONS-SCNC: 4 MMOL/L (ref 4–13)
ATRIAL RATE: 56 BPM
BUN SERPL-MCNC: 73 MG/DL (ref 5–25)
CALCIUM ALBUM COR SERPL-MCNC: 9.6 MG/DL (ref 8.3–10.1)
CALCIUM SERPL-MCNC: 8.6 MG/DL (ref 8.3–10.1)
CHLORIDE SERPL-SCNC: 114 MMOL/L (ref 96–108)
CO2 SERPL-SCNC: 19 MMOL/L (ref 21–32)
CREAT SERPL-MCNC: 6.31 MG/DL (ref 0.6–1.3)
ERYTHROCYTE [DISTWIDTH] IN BLOOD BY AUTOMATED COUNT: 12.3 % (ref 11.6–15.1)
GFR SERPL CREATININE-BSD FRML MDRD: 10 ML/MIN/1.73SQ M
GLUCOSE SERPL-MCNC: 105 MG/DL (ref 65–140)
HCT VFR BLD AUTO: 26.9 % (ref 36.5–49.3)
HGB BLD-MCNC: 9.1 G/DL (ref 12–17)
MCH RBC QN AUTO: 32.3 PG (ref 26.8–34.3)
MCHC RBC AUTO-ENTMCNC: 33.8 G/DL (ref 31.4–37.4)
MCV RBC AUTO: 95 FL (ref 82–98)
P AXIS: 56 DEGREES
PHOSPHATE SERPL-MCNC: 5.8 MG/DL (ref 2.7–4.5)
PLATELET # BLD AUTO: 298 THOUSANDS/UL (ref 149–390)
PMV BLD AUTO: 11.6 FL (ref 8.9–12.7)
POTASSIUM SERPL-SCNC: 4.7 MMOL/L (ref 3.5–5.3)
PR INTERVAL: 126 MS
QRS AXIS: 64 DEGREES
QRSD INTERVAL: 100 MS
QT INTERVAL: 422 MS
QTC INTERVAL: 407 MS
RBC # BLD AUTO: 2.82 MILLION/UL (ref 3.88–5.62)
SODIUM SERPL-SCNC: 137 MMOL/L (ref 135–147)
T WAVE AXIS: 53 DEGREES
VENTRICULAR RATE: 56 BPM
WBC # BLD AUTO: 11.57 THOUSAND/UL (ref 4.31–10.16)

## 2023-06-17 PROCEDURE — 85027 COMPLETE CBC AUTOMATED: CPT | Performed by: GENERAL PRACTICE

## 2023-06-17 PROCEDURE — 93010 ELECTROCARDIOGRAM REPORT: CPT | Performed by: INTERNAL MEDICINE

## 2023-06-17 PROCEDURE — 99238 HOSP IP/OBS DSCHRG MGMT 30/<: CPT | Performed by: INTERNAL MEDICINE

## 2023-06-17 PROCEDURE — 80069 RENAL FUNCTION PANEL: CPT | Performed by: INTERNAL MEDICINE

## 2023-06-17 PROCEDURE — 93005 ELECTROCARDIOGRAM TRACING: CPT

## 2023-06-17 PROCEDURE — 99232 SBSQ HOSP IP/OBS MODERATE 35: CPT | Performed by: INTERNAL MEDICINE

## 2023-06-17 RX ADMIN — OXYCODONE HYDROCHLORIDE 5 MG: 5 TABLET ORAL at 00:54

## 2023-06-17 RX ADMIN — DOCUSATE SODIUM 100 MG: 100 CAPSULE, LIQUID FILLED ORAL at 08:19

## 2023-06-17 RX ADMIN — SODIUM BICARBONATE 650 MG TABLET 1300 MG: at 08:19

## 2023-06-17 RX ADMIN — ONDANSETRON 4 MG: 2 INJECTION INTRAMUSCULAR; INTRAVENOUS at 08:19

## 2023-06-17 RX ADMIN — ACETAMINOPHEN 650 MG: 325 TABLET, FILM COATED ORAL at 08:25

## 2023-06-17 NOTE — DISCHARGE INSTR - AVS FIRST PAGE
You will continue your training for peritoneal dialysis on Tuesday, June 20th  You will receive a phone call from Lyubov Flores on Monday, June 19th, with the next steps  Avoid getting the catheter area wet and keep the exit site covered  Please read the catheter care instructions

## 2023-06-17 NOTE — ASSESSMENT & PLAN NOTE
Lab Results   Component Value Date    EGFR 10 06/17/2023    EGFR 11 06/16/2023    EGFR 10 06/06/2023    CREATININE 6 31 (H) 06/17/2023    CREATININE 5 92 (H) 06/16/2023    CREATININE 6 37 (H) 06/06/2023     • Continue Vitamin D supplementation  • Recent PTH wnl

## 2023-06-17 NOTE — DISCHARGE SUMMARY
INTERNAL MEDICINE RESIDENCY DISCHARGE SUMMARY     Kan Salazar   30 y o  male  MRN: [id number]  Room/Bed: [facility] 825/[facility] [address] [place] [address]   Encounter: [id number]    Principal Problem:    ESRD needing dialysis [facility])  Active Problems:    Alport syndrome    Chronic kidney disease-mineral and bone disorder    Anemia      Anemia  Assessment & Plan  • Hgb very acceptable > 10    Chronic kidney disease-mineral and bone disorder  Assessment & Plan  Lab Results   Component Value Date    EGFR 10 06/17/2023    EGFR 11 06/16/2023    EGFR 10 06/06/2023    CREATININE 6 31 (H) 06/17/2023    CREATININE 5 92 (H) 06/16/2023    CREATININE 6 37 (H) 06/06/2023     • Continue Vitamin D supplementation  • Recent PTH wnl    Alport syndrome  Assessment & Plan  • Biopsy proven, follows with Dr Sue Tobias   • Undergoing transplant eval at [facility]    S/p PD catheter placement    * ESRD needing dialysis [facility])  Assessment & Plan  Lab Results   Component Value Date    EGFR 10 06/17/2023    EGFR 11 06/16/2023    EGFR 10 06/06/2023    CREATININE 6 31 (H) 06/17/2023    CREATININE 5 92 (H) 06/16/2023    CREATININE 6 37 (H) 06/06/2023     • Direct admit 6/15 for pre op clearance and PD catheter placement (6/16 with IR) for urgent start PD given Cr up to 6 and now with intermittent n/v and lethargy indicative of uremic symptoms  • Nephrology appreciated  • IR placed PD catheter placed 6/16/23; underwent 2 cycles last night  • Continue bicarb supplementation      Damian0 [facility] is a 30 y o  male with a PMH of biopsy-proven Alport disease, CKD that has now progressed to end-stage renal disease, is here as a direct admit for PD catheter placement by IR and preop clearance  Pt had an elevated Cr of 5 9 and uremic sxs that include lethargy, nausea, and vomiting ([place] of [address])  Patient is undergoing a transplant work up at [facility]   He tolerated the PD catheter placement well and successfully underwent 2 cycles of PD last night  Patient seen and examined this morning  He c/o mild nausea but tolerating solid and liquid food  No abdominal pain, vomiting, fever, or chills  Vitals:    06/17/23 0803   BP: 152/98   Pulse: (!) 49   Resp: 16   Temp: 97 8 °F (36 6 °C)   SpO2: 99%     Physical Exam  Constitutional:       General: He is not in acute distress  Appearance: He is not ill-appearing or toxic-appearing  HENT:      Head: Normocephalic and atraumatic  Mouth/Throat:      Mouth: Mucous membranes are moist    Eyes:      General: No scleral icterus  Extraocular Movements: Extraocular movements intact  Conjunctiva/sclera: Conjunctivae normal    Cardiovascular:      Rate and Rhythm: Normal rate and regular rhythm  Pulses: Normal pulses  Heart sounds: No murmur heard  Pulmonary:      Effort: Pulmonary effort is normal  No respiratory distress  Breath sounds: Normal breath sounds  No wheezing  Abdominal:      General: There is no distension  Palpations: Abdomen is soft  Tenderness: There is no abdominal tenderness  Comments: PD catheter in place, dressings in place   Musculoskeletal:      Right lower leg: No edema  Left lower leg: No edema  Skin:     General: Skin is warm and dry  Capillary Refill: Capillary refill takes less than 2 seconds  Neurological:      Mental Status: He is alert and oriented to person, place, and time  Psychiatric:         Mood and Affect: Mood normal          Behavior: Behavior normal          Thought Content:  Thought content normal          Judgment: Judgment normal          DISCHARGE INFORMATION     PCP at Discharge: Sonam Guthrie DO    Admitting Provider: Keisha Macedo DO  Admission Date: 6/15/2023    Discharge Provider: Keisha Macedo DO  Discharge Date: 06/17/23    Discharge Disposition: Home/Self Care  Discharge Condition: stable  Discharge with Lines: yes    Type: PD Catheter Discharge Diet: regular diet  Activity Restrictions: none  Test Results Pending at Discharge: n/a    Discharge Diagnoses:  Principal Problem:    ESRD needing dialysis [facility])  Active Problems:    Alport syndrome    Chronic kidney disease-mineral and bone disorder    Anemia  Resolved Problems:    Pre-op exam      Consulting Providers:      Diagnostic & Therapeutic Procedures Performed:  IR peritoneal dialysis catheter placement    Result Date: 6/16/2023  Impression: Impression:  Successful placement of a right-sided peritoneal dialysis catheter with its pigtail in the pelvis  Plan:  Follow-up with the PD nurse in 1 week and in IR clinic in 10 days for a virtual visit  Workstation performed: [id number]       Code Status: Level 1 - Full Code  Advance Directive & Living Will: <no information>  Power of :    POLST:      Medications:  Current Discharge Medication List        Current Discharge Medication List        Current Discharge Medication List      CONTINUE these medications which have NOT CHANGED    Details   cholecalciferol (VITAMIN D3) 1,000 units tablet Take 2 tablets (2,000 Units total) by mouth daily  Qty: 90 tablet, Refills: 3    Associated Diagnoses: Metabolic acidosis; Stage 3b chronic kidney disease ([facility] [address] );  IgA nephropathy; Elevated blood pressure reading      sodium bicarbonate 650 mg tablet Take 2 tablets (1,300 mg total) by mouth 2 (two) times a day  Qty: 360 tablet, Refills: 3    Associated Diagnoses: Metabolic acidosis      famotidine (PEPCID) 10 mg tablet Take 1 tablet (10 mg total) by mouth daily  Qty: 90 tablet, Refills: 3    Associated Diagnoses: CKD (chronic kidney disease) stage 4, GFR 15-29 ml/min ([facility])             Allergies:  No Known Allergies    FOLLOW-UP     PCP Outpatient Follow-up:  yes  f/up in 7-14 days    Consulting Providers Follow-up:  f/up with nephrology     Active Issues Requiring Follow-up:   Continue your training for peritoneal dialysis on Tuesday, June 20th  Pt to "recieve phone call from Sathish Milan on Monday, June 19th, with the next steps  Avoid getting the catheter area wet and keep the exit site covered  Please read the catheter care instructions  Discharge Statement:   I spent 1 hour minutes discharging the patient  This time was spent on the day of discharge  I had direct contact with the patient on the day of discharge  Additional documentation is required if more than 30 minutes were spent on discharge  Portions of the record may have been created with voice recognition software  Occasional wrong word or \"sound a like\" substitutions may have occurred due to the inherent limitations of voice recognition software    Read the chart carefully and recognize, using context, where substitutions have occurred     ==  Alena De Dios, [facility]  Internal Medicine Resident PGY-1        "

## 2023-06-17 NOTE — ASSESSMENT & PLAN NOTE
Lab Results   Component Value Date    EGFR 10 06/17/2023    EGFR 11 06/16/2023    EGFR 10 06/06/2023    CREATININE 6 31 (H) 06/17/2023    CREATININE 5 92 (H) 06/16/2023    CREATININE 6 37 (H) 06/06/2023     • Direct admit 6/15 for pre op clearance and PD catheter placement (6/16 with IR) for urgent start PD given Cr up to 6 and now with intermittent n/v and lethargy indicative of uremic symptoms  • Nephrology appreciated  • IR placed PD catheter placed 6/16/23; underwent 2 cycles last night  • Continue bicarb supplementation

## 2023-06-17 NOTE — PROGRESS NOTES
"    Progress Note - Nephrology   Cydney Chen 30 y o  male MRN: [id number]  [facility] Encounter: [id number]      Assessment / Plan:  1  ESRD due to X-linked hereditary nephritis, biopsy-proven, with minimal IgA nephropathy  - Urine protein to creatinine ratio 4-5 g  - Patient had developed worsening kidney disease and development of mild uremia  - Transplant evaluation ongoing at [facility]  - Per outpatient nephrologist, Dr Princess Rouse, preference, PD catheter placed by IR on 2023 with urgent start PD the same day  - Status post 750 mL dwells x2 over 3 hours with hour and 15-minute dwell times  - Patient to continue PD training as outpatient  To receive a phone call from dialysis nurse on Monday regarding plans to start training on  Julia Khan for d/c from renal perspective  Have discussed this plan with primary team via TT and team agrees  - Patient has been instructed to avoid showering and should follow catheter care instructions    2  Hypertension-blood pressure acceptable off antihypertensives    3  Anemia- hemoglobin 9 1, could consider Venofer/MAG outpatient with PD    4  Metabolic acidosis-serum bicarbonate 19, continue sodium bicarbonate 1300 mg twice daily until regular PD initiation    5  Mild hyperphosphatemia- continue vitamin D 2000 units daily, monitor PTH/Phos outpatient        Subjective: The patient states that his urgent start PD went well  He did have some drain pain with the first well but improved with the second  Denies chest pain or shortness of breath  Ready go home  Objective:     Vitals: Blood pressure 152/98, pulse (!) 49, temperature 97 8 °F (36 6 °C), resp  rate 16, height 5' 10 98\" (1 803 m), weight 71 7 kg (158 lb 1 1 oz), SpO2 99 %  ,Body mass index is 22 06 kg/m²  Temp (24hrs), Av 4 °F (36 9 °C), Min:97 8 °F (36 6 °C), Max:98 7 °F (37 1 °C)      Weight (last 2 days)     Date/Time Weight    23 2625 --    Comment rows:    " OBSERV: Pt 's family at his bedside  at 06/16/23 1515    06/15/23 1813 71 7 (158 07)            Intake/Output Summary (Last 24 hours) at 6/17/2023 1637  Last data filed at 6/17/2023 0800  Gross per 24 hour   Intake 180 ml   Output --   Net 180 ml     I/O last 24 hours: In: 0 [P O :180; I V :700]  Out: -         Physical Exam:   Physical Exam  Vitals reviewed  Constitutional:       General: He is not in acute distress  Appearance: Normal appearance  He is well-developed  He is not diaphoretic  Comments: thin   HENT:      Head: Normocephalic and atraumatic  Nose: Nose normal       Mouth/Throat:      Mouth: Mucous membranes are moist       Pharynx: No oropharyngeal exudate  Eyes:      General: No scleral icterus  Right eye: No discharge  Left eye: No discharge  Neck:      Thyroid: No thyromegaly  Cardiovascular:      Rate and Rhythm: Normal rate and regular rhythm  Heart sounds: Normal heart sounds  Pulmonary:      Effort: Pulmonary effort is normal       Breath sounds: Normal breath sounds  No wheezing or rales  Abdominal:      General: Bowel sounds are normal  There is no distension  Palpations: Abdomen is soft  Tenderness: There is no abdominal tenderness  Comments: PD catheter covered   Musculoskeletal:         General: No swelling  Normal range of motion  Cervical back: Neck supple  Lymphadenopathy:      Cervical: No cervical adenopathy  Skin:     General: Skin is warm and dry  Findings: No rash  Neurological:      General: No focal deficit present  Mental Status: He is alert  Comments: awake   Psychiatric:         Mood and Affect: Mood normal          Behavior: Behavior normal          Invasive Devices     Peripheral Intravenous Line  Duration           Peripheral IV 06/16/23 Dorsal (posterior); Right Wrist 1 day          Line  Duration           Peritoneal Dialysis Catheter Classic Curl Abdominal 1 day "      Medications:    Scheduled Meds:  Current Facility-Administered Medications   Medication Dose Route Frequency Provider Last Rate   • acetaminophen  650 mg Oral Q4H PRN Paulina Koroma MD     • aluminum-magnesium hydroxide-simethicone  30 mL Oral Q6H PRN Oumar Cordero MD     • cholecalciferol  2,000 Units Oral Daily Oumar Cordero MD     • docusate sodium  100 mg Oral BID Oumar Cordero MD     • famotidine  10 mg Oral Daily Oumar Cordero MD     • HYDROmorphone  0 5 mg Intravenous Q2H PRN Paulina Koroma MD     • ondansetron  4 mg Intravenous Q6H PRN Oumar oCrdero MD     • oxyCODONE  5 mg Oral Q4H PRN Paulina Koroma MD     • oxyCODONE  10 mg Oral Q4H PRN Paulina Koroma MD     • senna  1 tablet Oral Daily Oumar Cordero MD     • sodium bicarbonate  1,300 mg Oral BID Oumar Cordero MD         PRN Meds: •  acetaminophen  •  aluminum-magnesium hydroxide-simethicone  •  HYDROmorphone  •  ondansetron  •  oxyCODONE  •  oxyCODONE    Continuous Infusions:         LAB RESULTS:      Results from last 7 days   Lab Units 06/17/23  0609 06/16/23  0624   WBC Thousand/uL 11 57* 6 65   HEMOGLOBIN g/dL 9 1* 10 2*   HEMATOCRIT % 26 9* 30 4*   PLATELETS Thousands/uL 298 287   POTASSIUM mmol/L 4 7 4 7   CHLORIDE mmol/L 114* 118*   CO2 mmol/L 19* 18*   BUN mg/dL 73* 76*   CREATININE mg/dL 6 31* 5 92*   CALCIUM mg/dL 8 6 9 0   ALK PHOS U/L  --  54   ALT U/L  --  22   AST U/L  --  12   PHOSPHORUS mg/dL 5 8*  --        CUTURES:  No results found for: \"BLOODCX\", \"URINECX\"              Portions of the record may have been created with voice recognition software  Occasional wrong word or \"sound a like\" substitutions may have occurred due to the inherent limitations of voice recognition software  Read the chart carefully and recognize, using context, where substitutions have occurred  If you have any questions, please contact the dictating provider      "

## 2023-06-17 NOTE — PLAN OF CARE
Problem: PAIN - ADULT  Goal: Verbalizes/displays adequate comfort level or baseline comfort level  Description: Interventions:  - Encourage patient to monitor pain and request assistance  - Assess pain using appropriate pain scale  - Administer analgesics based on type and severity of pain and evaluate response  - Implement non-pharmacological measures as appropriate and evaluate response  - Consider cultural and social influences on pain and pain management  - Notify physician/advanced practitioner if interventions unsuccessful or patient reports new pain  6/17/2023 0238 by Caren Pain  Outcome: Progressing  6/17/2023 0238 by Caren Pain  Outcome: Progressing     Problem: SAFETY ADULT  Goal: Patient will remain free of falls  Description: INTERVENTIONS:  - Educate patient/family on patient safety including physical limitations  - Instruct patient to call for assistance with activity   - Consult OT/PT to assist with strengthening/mobility   - Keep Call bell within reach  - Keep bed low and locked with side rails adjusted as appropriate  - Keep care items and personal belongings within reach  - Initiate and maintain comfort rounds  - Make Fall Risk Sign visible to staff  - Apply yellow socks and bracelet for high fall risk patients  - Consider moving patient to room near nurses station  6/17/2023 0238 by Caren Pain  Outcome: Progressing  6/17/2023 0238 by Caren Pain  Outcome: Progressing  Goal: Maintain or return to baseline ADL function  Description: INTERVENTIONS:  -  Assess patient's ability to carry out ADLs; assess patient's baseline for ADL function and identify physical deficits which impact ability to perform ADLs (bathing, care of mouth/teeth, toileting, grooming, dressing, etc )  - Assess/evaluate cause of self-care deficits   - Assess range of motion  - Assess patient's mobility; develop plan if impaired  - Assess patient's need for assistive devices and provide as appropriate  - Encourage maximum independence but intervene and supervise when necessary  - Involve family in performance of ADLs  - Assess for home care needs following discharge   - Consider OT consult to assist with ADL evaluation and planning for discharge  - Provide patient education as appropriate  6/17/2023 0238 by Delisa Calloway  Outcome: Progressing  6/17/2023 0238 by Delisa Calloway  Outcome: Progressing  Goal: Maintains/Returns to pre admission functional level  Description: INTERVENTIONS:  - Perform BMAT or MOVE assessment daily    - Set and communicate daily mobility goal to care team and patient/family/caregiver  - Collaborate with rehabilitation services on mobility goals if consulted  - Out of bed for toileting  - Record patient progress and toleration of activity level   6/17/2023 0238 by eDlisa Calloway  Outcome: Progressing  6/17/2023 0238 by Delisa Calloway  Outcome: Progressing     Problem: Nutrition/Hydration-ADULT  Goal: Nutrient/Hydration intake appropriate for improving, restoring or maintaining nutritional needs  Description: Monitor and assess patient's nutrition/hydration status for malnutrition  Collaborate with interdisciplinary team and initiate plan and interventions as ordered  Monitor patient's weight and dietary intake as ordered or per policy  Utilize nutrition screening tool and intervene as necessary  Determine patient's food preferences and provide high-protein, high-caloric foods as appropriate       INTERVENTIONS:  - Monitor oral intake, urinary output, labs, and treatment plans  - Assess nutrition and hydration status and recommend course of action  - Evaluate amount of meals eaten  - Assist patient with eating if necessary   - Allow adequate time for meals  - Recommend/ encourage appropriate diets, oral nutritional supplements, and vitamin/mineral supplements  - Order, calculate, and assess calorie counts as needed  - Recommend, monitor, and adjust tube feedings and TPN/PPN based on assessed needs  - Assess need for intravenous fluids  - Provide specific nutrition/hydration education as appropriate  - Include patient/family/caregiver in decisions related to nutrition  6/17/2023 0238 by Almita Contreras  Outcome: Progressing  6/17/2023 0238 by Almita Contreras  Outcome: Progressing     Problem: METABOLIC, FLUID AND ELECTROLYTES - ADULT  Goal: Electrolytes maintained within normal limits  Description: INTERVENTIONS:  - Monitor labs and assess patient for signs and symptoms of electrolyte imbalances  - Administer electrolyte replacement as ordered  - Monitor response to electrolyte replacements, including repeat lab results as appropriate  - Instruct patient on fluid and nutrition as appropriate  6/17/2023 0238 by Almita Contreras  Outcome: Progressing  6/17/2023 0238 by Almita Contreras  Outcome: Progressing  Goal: Fluid balance maintained  Description: INTERVENTIONS:  - Monitor labs   - Monitor I/O and WT  - Instruct patient on fluid and nutrition as appropriate  - Assess for signs & symptoms of volume excess or deficit  6/17/2023 0238 by Almita Contreras  Outcome: Progressing  6/17/2023 0238 by Almita Contreras  Outcome: Progressing     Problem: SKIN/TISSUE INTEGRITY - ADULT  Goal: Incision(s), wounds(s) or drain site(s) healing without S/S of infection  Description: INTERVENTIONS  - Assess and document dressing, incision, wound bed, drain sites and surrounding tissue  - Provide patient and family education  6/17/2023 0238 by Almita Contreras  Outcome: Progressing  6/17/2023 0238 by Almita Contreras  Outcome: Progressing

## 2023-06-17 NOTE — ANESTHESIA PREPROCEDURE EVALUATION
Procedure:  IR PERITONEAL DIALYSIS CATHETER PLACEMENT    Relevant Problems   /RENAL   (+) [place] syndrome   (+) CKD (chronic kidney disease) stage 5, GFR less than 15 ml/min ([facility])   (+) Chronic kidney disease-mineral and bone disorder   (+) ESRD needing dialysis ([facility])   (+) Hereditary nephritis   (+) IgA nephropathy      HEMATOLOGY   (+) Anemia        +marijuana use  NPO appropriate  Lab Results   Component Value Date    SODIUM 137 06/17/2023    K 4 7 06/17/2023     (H) 06/17/2023    CO2 19 (L) 06/17/2023    BUN 73 (H) 06/17/2023    CREATININE 6 31 (H) 06/17/2023    GLUC 105 06/17/2023    CALCIUM 8 6 06/17/2023       Physical Exam    Airway    Mallampati score: II  TM Distance: >3 FB  Neck ROM: full     Dental   No notable dental hx     Cardiovascular  Rhythm: regular, Rate: normal,     Pulmonary  Breath sounds clear to auscultation,     Other Findings        Anesthesia Plan  ASA Score- 2     Anesthesia Type- general with ASA Monitors  Additional Monitors:   Airway Plan: ETT  Comment: Risks/benefits and alternatives discussed with patient including possible PONV, sore throat, damage to teeth/lips/gums/esophagus, and possibility of rare anesthetic and surgical emergencies including but not limited to heart attack, stroke, and/or death  All questions were answered          Plan Factors-Exercise tolerance (METS): >4 METS  Chart reviewed  Existing labs reviewed  Patient summary reviewed  Patient is a current smoker  Induction- intravenous  Postoperative Plan-   Planned trial extubation    Informed Consent- Anesthetic plan and risks discussed with patient and spouse  I personally reviewed this patient with the CRNA  Discussed and agreed on the Anesthesia Plan with the CRNA  Teddy Melo

## 2023-06-17 NOTE — PLAN OF CARE
Problem: PAIN - ADULT  Goal: Verbalizes/displays adequate comfort level or baseline comfort level  Description: Interventions:  - Encourage patient to monitor pain and request assistance  - Assess pain using appropriate pain scale  - Administer analgesics based on type and severity of pain and evaluate response  - Implement non-pharmacological measures as appropriate and evaluate response  - Consider cultural and social influences on pain and pain management  - Notify physician/advanced practitioner if interventions unsuccessful or patient reports new pain  Outcome: Progressing     Problem: INFECTION - ADULT  Goal: Absence or prevention of progression during hospitalization  Description: INTERVENTIONS:  - Assess and monitor for signs and symptoms of infection  - Monitor lab/diagnostic results  - Monitor all insertion sites, i e  indwelling lines, tubes, and drains  - Monitor endotracheal if appropriate and nasal secretions for changes in amount and color  - [place] appropriate cooling/warming therapies per order  - Administer medications as ordered  - Instruct and encourage patient and family to use good hand hygiene technique  - Identify and instruct in appropriate isolation precautions for identified infection/condition  Outcome: Progressing     Problem: DISCHARGE PLANNING  Goal: Discharge to home or other facility with appropriate resources  Description: INTERVENTIONS:  - Identify barriers to discharge w/patient and caregiver  - Arrange for needed discharge resources and transportation as appropriate  - Identify discharge learning needs (meds, wound care, etc )  - Arrange for interpretive services to assist at discharge as needed  - Refer to Case Management Department for coordinating discharge planning if the patient needs post-hospital services based on physician/advanced practitioner order or complex needs related to functional status, cognitive ability, or social support system  Outcome: Progressing Problem: Knowledge Deficit  Goal: Patient/family/caregiver demonstrates understanding of disease process, treatment plan, medications, and discharge instructions  Description: Complete learning assessment and assess knowledge base  Interventions:  - Provide teaching at level of understanding  - Provide teaching via preferred learning methods  Outcome: Progressing     Problem: Nutrition/Hydration-ADULT  Goal: Nutrient/Hydration intake appropriate for improving, restoring or maintaining nutritional needs  Description: Monitor and assess patient's nutrition/hydration status for malnutrition  Collaborate with interdisciplinary team and initiate plan and interventions as ordered  Monitor patient's weight and dietary intake as ordered or per policy  Utilize nutrition screening tool and intervene as necessary  Determine patient's food preferences and provide high-protein, high-caloric foods as appropriate       INTERVENTIONS:  - Monitor oral intake, urinary output, labs, and treatment plans  - Assess nutrition and hydration status and recommend course of action  - Evaluate amount of meals eaten  - Assist patient with eating if necessary   - Allow adequate time for meals  - Recommend/ encourage appropriate diets, oral nutritional supplements, and vitamin/mineral supplements  - Order, calculate, and assess calorie counts as needed  - Recommend, monitor, and adjust tube feedings and TPN/PPN based on assessed needs  - Assess need for intravenous fluids  - Provide specific nutrition/hydration education as appropriate  - Include patient/family/caregiver in decisions related to nutrition  Outcome: Progressing     Problem: METABOLIC, FLUID AND ELECTROLYTES - ADULT  Goal: Electrolytes maintained within normal limits  Description: INTERVENTIONS:  - Monitor labs and assess patient for signs and symptoms of electrolyte imbalances  - Administer electrolyte replacement as ordered  - Monitor response to electrolyte replacements, including repeat lab results as appropriate  - Instruct patient on fluid and nutrition as appropriate  Outcome: Progressing  Goal: Fluid balance maintained  Description: INTERVENTIONS:  - Monitor labs   - Monitor I/O and WT  - Instruct patient on fluid and nutrition as appropriate  - Assess for signs & symptoms of volume excess or deficit  Outcome: Progressing     Problem: SKIN/TISSUE INTEGRITY - ADULT  Goal: Incision(s), wounds(s) or drain site(s) healing without S/S of infection  Description: INTERVENTIONS  - Assess and document dressing, incision, wound bed, drain sites and surrounding tissue  - Provide patient and family education    Outcome: Progressing

## 2023-06-17 NOTE — ASSESSMENT & PLAN NOTE
• Biopsy proven, follows with Dr Willeen Fleischer   • Undergoing transplant eval at [facility]    S/p PD catheter placement

## 2023-06-17 NOTE — PLAN OF CARE
Problem: PAIN - ADULT  Goal: Verbalizes/displays adequate comfort level or baseline comfort level  Description: Interventions:  - Encourage patient to monitor pain and request assistance  - Assess pain using appropriate pain scale  - Administer analgesics based on type and severity of pain and evaluate response  - Implement non-pharmacological measures as appropriate and evaluate response  - Consider cultural and social influences on pain and pain management  - Notify physician/advanced practitioner if interventions unsuccessful or patient reports new pain  6/17/2023 1824 by Claudetta Oas, RN  Outcome: Completed  6/17/2023 0758 by Claudetta Oas, RN  Outcome: Progressing     Problem: INFECTION - ADULT  Goal: Absence or prevention of progression during hospitalization  Description: INTERVENTIONS:  - Assess and monitor for signs and symptoms of infection  - Monitor lab/diagnostic results  - Monitor all insertion sites, i e  indwelling lines, tubes, and drains  - Monitor endotracheal if appropriate and nasal secretions for changes in amount and color  - [place] appropriate cooling/warming therapies per order  - Administer medications as ordered  - Instruct and encourage patient and family to use good hand hygiene technique  - Identify and instruct in appropriate isolation precautions for identified infection/condition  6/17/2023 1824 by Claudetta Oas, RN  Outcome: Completed  6/17/2023 0758 by Claudetta Oas, RN  Outcome: Progressing  Goal: Absence of fever/infection during neutropenic period  Description: INTERVENTIONS:  - Monitor WBC    Outcome: Completed     Problem: SAFETY ADULT  Goal: Patient will remain free of falls  Description: INTERVENTIONS:  - Educate patient/family on patient safety including physical limitations  - Instruct patient to call for assistance with activity   - Consult OT/PT to assist with strengthening/mobility   - Keep Call bell within reach  - Keep bed low and locked with side rails adjusted as appropriate  - Keep care items and personal belongings within reach  - Initiate and maintain comfort rounds    - Consider moving patient to room near nurses station  Outcome: Completed  Goal: Maintain or return to baseline ADL function  Description: INTERVENTIONS:  -  Assess patient's ability to carry out ADLs; assess patient's baseline for ADL function and identify physical deficits which impact ability to perform ADLs (bathing, care of mouth/teeth, toileting, grooming, dressing, etc )  - Assess/evaluate cause of self-care deficits   - Assess range of motion  - Assess patient's mobility; develop plan if impaired  - Assess patient's need for assistive devices and provide as appropriate  - Encourage maximum independence but intervene and supervise when necessary  - Involve family in performance of ADLs  - Assess for home care needs following discharge   - Consider OT consult to assist with ADL evaluation and planning for discharge  - Provide patient education as appropriate  Outcome: Completed  Goal: Maintains/Returns to pre admission functional level  Description: INTERVENTIONS:  - Perform BMAT or MOVE assessment daily    - Set and communicate daily mobility goal to care team and patient/family/caregiver     - Collaborate with rehabilitation services on mobility goals if consulted    - Out of bed for toileting  - Record patient progress and toleration of activity level   Outcome: Completed     Problem: DISCHARGE PLANNING  Goal: Discharge to home or other facility with appropriate resources  Description: INTERVENTIONS:  - Identify barriers to discharge w/patient and caregiver  - Arrange for needed discharge resources and transportation as appropriate  - Identify discharge learning needs (meds, wound care, etc )  - Arrange for interpretive services to assist at discharge as needed  - Refer to Case Management Department for coordinating discharge planning if the patient needs post-hospital services based on physician/advanced practitioner order or complex needs related to functional status, cognitive ability, or social support system  6/17/2023 1824 by Marisabel Valle RN  Outcome: Completed  6/17/2023 0758 by Marisabel Valle RN  Outcome: Progressing     Problem: Knowledge Deficit  Goal: Patient/family/caregiver demonstrates understanding of disease process, treatment plan, medications, and discharge instructions  Description: Complete learning assessment and assess knowledge base  Interventions:  - Provide teaching at level of understanding  - Provide teaching via preferred learning methods  6/17/2023 1824 by Marisabel Valle RN  Outcome: Completed  6/17/2023 0758 by Marisabel Valle RN  Outcome: Progressing     Problem: Nutrition/Hydration-ADULT  Goal: Nutrient/Hydration intake appropriate for improving, restoring or maintaining nutritional needs  Description: Monitor and assess patient's nutrition/hydration status for malnutrition  Collaborate with interdisciplinary team and initiate plan and interventions as ordered  Monitor patient's weight and dietary intake as ordered or per policy  Utilize nutrition screening tool and intervene as necessary  Determine patient's food preferences and provide high-protein, high-caloric foods as appropriate       INTERVENTIONS:  - Monitor oral intake, urinary output, labs, and treatment plans  - Assess nutrition and hydration status and recommend course of action  - Evaluate amount of meals eaten  - Assist patient with eating if necessary   - Allow adequate time for meals  - Recommend/ encourage appropriate diets, oral nutritional supplements, and vitamin/mineral supplements  - Order, calculate, and assess calorie counts as needed  - Recommend, monitor, and adjust tube feedings and TPN/PPN based on assessed needs  - Assess need for intravenous fluids  - Provide specific nutrition/hydration education as appropriate  - Include patient/family/caregiver in decisions related to nutrition  6/17/2023 1824 by Carol Hutchinson RN  Outcome: Completed  6/17/2023 0758 by Carol Hutchinson RN  Outcome: Progressing     Problem: METABOLIC, FLUID AND ELECTROLYTES - ADULT  Goal: Electrolytes maintained within normal limits  Description: INTERVENTIONS:  - Monitor labs and assess patient for signs and symptoms of electrolyte imbalances  - Administer electrolyte replacement as ordered  - Monitor response to electrolyte replacements, including repeat lab results as appropriate  - Instruct patient on fluid and nutrition as appropriate  6/17/2023 1824 by Carol Hutchinson RN  Outcome: Completed  6/17/2023 0758 by Carol Hutchinson RN  Outcome: Progressing  Goal: Fluid balance maintained  Description: INTERVENTIONS:  - Monitor labs   - Monitor I/O and WT  - Instruct patient on fluid and nutrition as appropriate  - Assess for signs & symptoms of volume excess or deficit  6/17/2023 1824 by Carol Hutchinson RN  Outcome: Completed  6/17/2023 0758 by Carol Hutchinson RN  Outcome: Progressing     Problem: SKIN/TISSUE INTEGRITY - ADULT  Goal: Incision(s), wounds(s) or drain site(s) healing without S/S of infection  Description: INTERVENTIONS  - Assess and document dressing, incision, wound bed, drain sites and surrounding   6/17/2023 1824 by Carol Hutchinson RN  Outcome: Completed  6/17/2023 0758 by Carol Hutchinson RN  Outcome: Progressing

## 2023-06-19 ENCOUNTER — TELEPHONE (OUTPATIENT)
Dept: NEPHROLOGY | Facility: CLINIC | Age: 38
End: 2023-06-19

## 2023-06-19 NOTE — TELEPHONE ENCOUNTER
Received vm from [place] at [address] they need results faxed over from pt Hep B panel and Chest X-ray  Pt is supposed to be starting dialysis tomorrow per [facility] facility  Call back [phone] ext   [phone]   Fax [phone]

## 2023-06-19 NOTE — UTILIZATION REVIEW
NOTIFICATION OF ADMISSION DISCHARGE   This is a Notification of Discharge from [facility]  Please be advised that this patient has been discharge from our facility  Below you will find the admission and discharge date and time including the patient’s disposition  UTILIZATION REVIEW CONTACT:  Emmett Joshua  Utilization   Network Utilization Review Department  Phone: [phone] x carefully listen to the prompts  All voicemails are confidential   Email: [email or web address]     ADMISSION INFORMATION  PRESENTATION DATE: 6/15/2023  6:04 PM  OBERVATION ADMISSION DATE:  INPATIENT ADMISSION DATE: 6/15/23  6:04 PM   DISCHARGE DATE: 6/17/2023  6:25 PM   DISPOSITION:Home/Self Care    IMPORTANT INFORMATION:  Send all requests for admission clinical reviews, approved or denied determinations and any other requests to dedicated fax number below belonging to the campus where the patient is receiving treatment   List of dedicated fax numbers:  [address] DENIALS (Administrative/Medical Necessity) [phone]   [address] (Maternity/NICU/Pediatrics) [phone]   [facility] [phone]   [facility] 87 [phone]   [address] [phone]   220 [facility] [phone]   90 [facility] [phone]   [address] [place], [place] [address] [phone]   [facility]  [phone]   4053 [facility] [phone] 412 [facility] [address] [facility] [phone]

## 2023-06-20 DIAGNOSIS — N18.6 ESRD ON PERITONEAL DIALYSIS (HCC): Primary | ICD-10-CM

## 2023-06-20 DIAGNOSIS — Z99.2 ESRD ON PERITONEAL DIALYSIS (HCC): Primary | ICD-10-CM

## 2023-06-20 RX ORDER — GENTAMICIN SULFATE 1 MG/G
CREAM TOPICAL DAILY
Qty: 30 G | Refills: 3 | Status: SHIPPED | OUTPATIENT
Start: 2023-06-20

## 2023-06-20 RX ORDER — CEPHALEXIN 500 MG/1
500 CAPSULE ORAL EVERY 12 HOURS SCHEDULED
Qty: 6 CAPSULE | Refills: 0 | Status: SHIPPED | OUTPATIENT
Start: 2023-06-20 | End: 2023-06-23

## 2023-06-28 ENCOUNTER — TELEMEDICINE (OUTPATIENT)
Dept: INTERVENTIONAL RADIOLOGY/VASCULAR | Facility: CLINIC | Age: 38
End: 2023-06-28
Payer: COMMERCIAL

## 2023-06-28 DIAGNOSIS — N18.5 CKD (CHRONIC KIDNEY DISEASE) STAGE 5, GFR LESS THAN 15 ML/MIN (HCC): Primary | ICD-10-CM

## 2023-06-28 DIAGNOSIS — N18.6 ESRD NEEDING DIALYSIS (HCC): ICD-10-CM

## 2023-06-28 DIAGNOSIS — Z99.2 ESRD NEEDING DIALYSIS (HCC): ICD-10-CM

## 2023-06-28 PROCEDURE — 99213 OFFICE O/P EST LOW 20 MIN: CPT | Performed by: RADIOLOGY

## 2023-06-28 NOTE — PROGRESS NOTES
Virtual Regular Visit    Verification of patient location:    Patient is located at Home in the following state in which I hold an active license [place]      Assessment/Plan:    Problem List Items Addressed This Visit    None           Reason for visit is   Chief Complaint   Patient presents with   • Virtual Regular Visit        Encounter provider Cristina Escoto MD    Provider located at [address] [address] [phone]      Recent Visits  No visits were found meeting these conditions  Showing recent visits within past 7 days and meeting all other requirements  Future Appointments  No visits were found meeting these conditions  Showing future appointments within next 150 days and meeting all other requirements       The patient was identified by name and date of birth  Marisela Baptiste was informed that this is a telemedicine visit and that the visit is being conducted through the [employer]  He agrees to proceed     My office door was closed  No one else was in the room  He acknowledged consent and understanding of privacy and security of the video platform  The patient has agreed to participate and understands they can discontinue the visit at any time  Patient is aware this is a billable service  Subjective  Marisela Baptiste is a 30 y o  male with ESRD s/p PD catheter placement 10 days ago for a post-op follow-up  The catheter is working well  The patient does have some posterior groin pain at the end of the drainages, but that is to be expected and is improving each time  I told the patient ahead of time that this could happen and is to be expected and should resolve within 1-2 months  He still has some muscle tenderness over the abdomen but that is improving  Overall, he is doing very well  He went through the class and is getting his home supplies today    At this point, there is no further IR follow-up as long as the catheter is functioning well  HPI     Past Medical History:   Diagnosis Date   • Hematuria    • Proteinuria        Past Surgical History:   Procedure Laterality Date   • CLAVICLE SURGERY     • CT NEEDLE BIOPSY KIDNEY  2/15/2019   • IR PERITONEAL DIALYSIS CATHETER PLACEMENT  6/16/2023   • RENAL BIOPSY  2/19    X linked [employer] syndrom diagnosis  Current Outpatient Medications   Medication Sig Dispense Refill   • cholecalciferol (VITAMIN D3) 1,000 units tablet Take 2 tablets (2,000 Units total) by mouth daily 90 tablet 3   • famotidine (PEPCID) 10 mg tablet Take 1 tablet (10 mg total) by mouth daily 90 tablet 3   • gentamicin (GARAMYCIN) 0 1 % cream Apply topically daily To pd catheter site 30 g 3   • sodium bicarbonate 650 mg tablet Take 2 tablets (1,300 mg total) by mouth 2 (two) times a day 360 tablet 3     No current facility-administered medications for this visit  No Known Allergies    Review of Systems    Video Exam    There were no vitals filed for this visit  Physical Exam     Visit Time  Total Visit Duration: 25 minutes

## 2023-06-28 NOTE — LETTER
June 28, 2023     [facility]  [address]    Patient: Jorge Parikh   YOB: 1985   Date of Visit: 6/28/2023       Dear Dr Sixto Castrejon:    Thank you for referring Eris Martinez to me for evaluation  Below are my notes for this consultation  If you have questions, please do not hesitate to call me  I look forward to following your patient along with you  Sincerely,        Cindi Valle MD        CC: Ledell Hashimoto, DO Teretha Haw, MD Lynnette Keepers, MD  6/28/2023  8:29 AM  Sign when Signing Visit  Virtual Regular Visit    Verification of patient location:    Patient is located at Home in the following state in which I hold an active license [place]      Assessment/Plan:    Problem List Items Addressed This Visit    None           Reason for visit is   Chief Complaint   Patient presents with   • Virtual Regular Visit        Encounter provider Cindi Valle MD    Provider located at [address] [address] [phone]      Recent Visits  No visits were found meeting these conditions  Showing recent visits within past 7 days and meeting all other requirements  Future Appointments  No visits were found meeting these conditions  Showing future appointments within next 150 days and meeting all other requirements       The patient was identified by name and date of birth  Jorge Parikh was informed that this is a telemedicine visit and that the visit is being conducted through the [employer]  He agrees to proceed     My office door was closed  No one else was in the room  He acknowledged consent and understanding of privacy and security of the video platform  The patient has agreed to participate and understands they can discontinue the visit at any time  Patient is aware this is a billable service       Subjective  Jorge Parikh is a 30 y o  male with ESRD s/p PD catheter placement 10 days ago for a post-op follow-up  The catheter is working well  The patient does have some posterior groin pain at the end of the drainages, but that is to be expected and is improving each time  I told the patient ahead of time that this could happen and is to be expected and should resolve within 1-2 months  He still has some muscle tenderness over the abdomen but that is improving  Overall, he is doing very well  He went through the class and is getting his home supplies today  At this point, there is no further IR follow-up as long as the catheter is functioning well  HPI     Past Medical History:   Diagnosis Date   • Hematuria    • Proteinuria        Past Surgical History:   Procedure Laterality Date   • CLAVICLE SURGERY     • CT NEEDLE BIOPSY KIDNEY  2/15/2019   • IR PERITONEAL DIALYSIS CATHETER PLACEMENT  6/16/2023   • RENAL BIOPSY  2/19    X linked [employer] syndrom diagnosis  Current Outpatient Medications   Medication Sig Dispense Refill   • cholecalciferol (VITAMIN D3) 1,000 units tablet Take 2 tablets (2,000 Units total) by mouth daily 90 tablet 3   • famotidine (PEPCID) 10 mg tablet Take 1 tablet (10 mg total) by mouth daily 90 tablet 3   • gentamicin (GARAMYCIN) 0 1 % cream Apply topically daily To pd catheter site 30 g 3   • sodium bicarbonate 650 mg tablet Take 2 tablets (1,300 mg total) by mouth 2 (two) times a day 360 tablet 3     No current facility-administered medications for this visit  No Known Allergies    Review of Systems    Video Exam    There were no vitals filed for this visit  Physical Exam     Visit Time  Total Visit Duration: 25 minutes

## 2023-07-07 ENCOUNTER — CONSULT (OUTPATIENT)
Dept: CARDIOLOGY CLINIC | Facility: CLINIC | Age: 38
End: 2023-07-07
Payer: COMMERCIAL

## 2023-07-07 VITALS
WEIGHT: 156 LBS | BODY MASS INDEX: 22.33 KG/M2 | SYSTOLIC BLOOD PRESSURE: 130 MMHG | HEIGHT: 70 IN | HEART RATE: 68 BPM | DIASTOLIC BLOOD PRESSURE: 70 MMHG

## 2023-07-07 DIAGNOSIS — N18.5 CKD (CHRONIC KIDNEY DISEASE) STAGE 5, GFR LESS THAN 15 ML/MIN (HCC): ICD-10-CM

## 2023-07-07 DIAGNOSIS — Z01.818 PRE-TRANSPLANT EVALUATION FOR CKD (CHRONIC KIDNEY DISEASE): ICD-10-CM

## 2023-07-07 DIAGNOSIS — Z01.810 PRE-OPERATIVE CARDIOVASCULAR EXAMINATION: Primary | ICD-10-CM

## 2023-07-07 PROCEDURE — 99204 OFFICE O/P NEW MOD 45 MIN: CPT | Performed by: INTERNAL MEDICINE

## 2023-07-07 NOTE — PATIENT INSTRUCTIONS
You were seen today in the Cardiology office for pre operative risk assessment. No additional cardiac work up is recommended at this time. Thank you for choosing [address] [place] [address].

## 2023-07-07 NOTE — PROGRESS NOTES
[facility]    CHIEF COMPLAINT:   Chief Complaint   Patient presents with   • Follow-up     No cardiac complaints. HPI:  Freddie Dumont is a 30 y.o. male with a past medical history significant for Alport syndrome and chronic kidney disease stage V who presents today in consultation for pre-operative risk assessment for kidney transplant. He is following with Dr. Stephanie Tellez at [facility]. He denies any past history of diabetes mellitus, TIA/CVA, myocardial infarction, congestive heart failure, DVT/PE. Currently, working as a . He is generally active but has no designated exercise regimen. He has no acute complaints. He denies any fever, chills, fatigue, new visual changes, lightheadedness, syncope, chest pain, palpitations, shortness of breath at rest or with exertion, orthopnea, PND, nausea, vomiting, diarrhea, dark or bright red blood in stools, lower extremity swelling, leg claudication. Social history: Non-smoker. No alcohol. No illicit drugs. Family history: Maternal uncle had Alport's and renal tx. Another maternal uncle had 2 renal transplants. No known family history of premature coronary artery or cerebrovascular disease. The following portions of the patient's history were reviewed and updated as appropriate: allergies, current medications, past family history, past medical history, past social history, past surgical history, and problem list.    SINCE LAST OV I REVIEWED WITH THE PATIENT THE INTERIM LABS, TEST RESULTS, CONSULTANT(S) NOTES AND PERFORMED AN INTERIM REVIEW OF HISTORY    Past Medical History:   Diagnosis Date   • Hematuria    • Proteinuria        Past Surgical History:   Procedure Laterality Date   • CLAVICLE SURGERY     • CT NEEDLE BIOPSY KIDNEY  2/15/2019   • IR PERITONEAL DIALYSIS CATHETER PLACEMENT  6/16/2023   • RENAL BIOPSY  2/19    X linked Alports syndrom diagnosis.        Social History     Socioeconomic History   • Marital status: /Civil [employer]     Spouse name: Not on file   • Number of children: Not on file   • Years of education: Not on file   • Highest education level: Not on file   Occupational History   • Not on file   Tobacco Use   • Smoking status: Never   • Smokeless tobacco: Never   Substance and Sexual Activity   • Alcohol use: Never   • Drug use: Never   • Sexual activity: Yes     Partners: Female     Birth control/protection: None   Other Topics Concern   • Not on file   Social History Narrative   • Not on file     Social Determinants of Health     Financial Resource Strain: Not on file   Food Insecurity: Not on file   Transportation Needs: Not on file   Physical Activity: Not on file   Stress: Not on file   Social Connections: Not on file   Intimate Partner Violence: Not on file   Housing Stability: Not on file       Family History   Problem Relation Age of Onset   • Breast cancer Mother    • No Known Problems Father    • Kidney disease Maternal Uncle    • Kidney disease Maternal Grandfather        No Known Allergies    Current Outpatient Medications   Medication Sig Dispense Refill   • cholecalciferol (VITAMIN D3) 1,000 units tablet Take 2 tablets (2,000 Units total) by mouth daily 90 tablet 3   • famotidine (PEPCID) 10 mg tablet Take 1 tablet (10 mg total) by mouth daily 90 tablet 3   • gentamicin (GARAMYCIN) 0.1 % cream Apply topically daily To pd catheter site 30 g 3   • sodium bicarbonate 650 mg tablet Take 2 tablets (1,300 mg total) by mouth 2 (two) times a day 360 tablet 3     No current facility-administered medications for this visit. /70 (BP Location: Left arm, Patient Position: Sitting, Cuff Size: Standard)   Pulse 68   Ht 5' 10" (1.778 m)   Wt 70.8 kg (156 lb)   BMI 22.38 kg/m²     Review of Systems   All other systems reviewed and are negative. Physical Exam  Vitals and nursing note reviewed. Constitutional:       General: He is not in acute distress. Appearance: Normal appearance.  He is well-developed and normal weight. He is not toxic-appearing. HENT:      Head: Normocephalic and atraumatic. Eyes:      General: No scleral icterus. Conjunctiva/sclera: Conjunctivae normal.   Neck:      Vascular: No carotid bruit. Cardiovascular:      Rate and Rhythm: Normal rate and regular rhythm. Pulses: Normal pulses. Heart sounds: Normal heart sounds. No murmur heard. No gallop. Pulmonary:      Effort: Pulmonary effort is normal. No respiratory distress. Breath sounds: Normal breath sounds. No wheezing or rales. Abdominal:      General: Abdomen is flat. Bowel sounds are normal. There is no distension. Palpations: Abdomen is soft. Tenderness: There is no abdominal tenderness. Musculoskeletal:         General: No swelling. Cervical back: Neck supple. Right lower leg: No edema. Left lower leg: No edema. Skin:     General: Skin is warm and dry. Capillary Refill: Capillary refill takes less than 2 seconds. Coloration: Skin is not jaundiced or pale. Neurological:      Mental Status: He is alert. Psychiatric:         Mood and Affect: Mood normal.         Behavior: Behavior normal.          Lab Results   Component Value Date    K 4.7 06/17/2023     (H) 06/17/2023    CO2 19 (L) 06/17/2023    BUN 73 (H) 06/17/2023    CREATININE 6.31 (H) 06/17/2023    CALCIUM 8.6 06/17/2023    ALT 22 06/16/2023    AST 12 06/16/2023    INR 0.94 02/15/2019     Lab Results   Component Value Date    WBC 11.57 (H) 06/17/2023    HGB 9.1 (L) 06/17/2023    HCT 26.9 (L) 06/17/2023     06/17/2023       Lab Results   Component Value Date     06/13/2023    HGBA1C 5.7 (H) 06/13/2023     Cardiac studies:   ECG - 6/17/23: Sinus bradycardia, voltage criteria for LVH    TTE - 5/16/23:   Left Ventricle Left ventricular cavity size is normal. Wall thickness is normal. The left ventricular ejection fraction is 60-65%.  Systolic function is normal.  Wall motion is normal. Diastolic function is normal.      Right Ventricle Right ventricular cavity size is upper limit of normal. Systolic function is normal.      Left Atrium The atrium is mildly dilated. Right Atrium The atrium is mildly dilated. Aortic Valve The aortic valve is trileaflet. There is no evidence of regurgitation. The aortic valve has no significant stenosis. Mitral Valve Mitral valve structure is normal. The leaflets are not thickened. There is trace regurgitation. There is no evidence of stenosis. Tricuspid Valve Tricuspid valve structure is normal. There is mild regurgitation. There is no evidence of stenosis. The right ventricular systolic pressure is mildly elevated. The estimated right ventricular systolic pressure is 95.29 mmHg. Pulmonic Valve Pulmonic valve structure is normal. There is trace regurgitation. There is no evidence of stenosis. Ascending Aorta The aortic root is normal in size. The ascending aorta is normal in size. IVC/SVC The right atrial pressure is estimated at 15.0 mmHg. The inferior vena cava is dilated. Respirophasic changes were blunted (less than 50% variation). Pericardium There is no pericardial effusion. ASSESSMENT AND PLAN:  Camilo Florence was seen today for follow-up. Diagnoses and all orders for this visit:    #. Pre-operative cardiovascular examination  #. Pre-transplant evaluation for CKD (chronic kidney disease)  #. CKD (chronic kidney disease) stage 5, GFR less than 15 ml/min [facility])    Mr. 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 He presents today in consultation for pre-operative risk assessment for renal transplant. He denies any past history of diabetes mellitus, TIA/CVA, myocardial infarction, congestive heart failure, DVT/PE. ECG demonstrates sinus bradycardia with criteria for LVH.  Echocardiogram demonstrates normal biventricular size and systolic function, mild tricuspid regurgitation, and mild pulmonary hypertension. He is asymptomatic from a cardiopulmonary perspective. He has no signs/symptoms of congestive heart failure. Functional capacity is >6 METS. Summary:  · Renal transplant - intermediate-risk procedure  · For surgery within the next 30 days, his [facility] perioperative risk for myocardial infarction or cardiac arrest intraoperatively or up to 30 days post-op is <1%. RCRI score II - risk for MACE 1.3%. · No Cardiac Contraindication for Planned Surgical Procedures. No additional cardiac work up is necessary at this time prior to the above-mentioned procedure. · Surgical date is to be determined. Should he develop any cardiac or pulmonary symptoms in the interim, I have asked him to return to the office for another clinical assessment.     Steph Echevarria MD

## 2023-07-26 ENCOUNTER — DOCUMENTATION (OUTPATIENT)
Dept: NEPHROLOGY | Facility: HOSPITAL | Age: 38
End: 2023-07-26

## 2023-07-26 DIAGNOSIS — Z99.2 ESRD NEEDING DIALYSIS (HCC): Primary | ICD-10-CM

## 2023-07-26 DIAGNOSIS — N18.6 ESRD NEEDING DIALYSIS (HCC): Primary | ICD-10-CM

## 2023-07-26 DIAGNOSIS — R06.02 SHORTNESS OF BREATH: ICD-10-CM

## 2023-07-27 ENCOUNTER — DOCUMENTATION (OUTPATIENT)
Dept: NEPHROLOGY | Facility: CLINIC | Age: 38
End: 2023-07-27

## 2023-07-27 DIAGNOSIS — N18.6 ESRD NEEDING DIALYSIS (HCC): Primary | ICD-10-CM

## 2023-07-27 DIAGNOSIS — Z99.2 ESRD NEEDING DIALYSIS (HCC): Primary | ICD-10-CM

## 2023-07-27 DIAGNOSIS — R06.02 SHORTNESS OF BREATH: ICD-10-CM

## 2023-07-28 ENCOUNTER — APPOINTMENT (OUTPATIENT)
Dept: RADIOLOGY | Age: 38
End: 2023-07-28
Payer: COMMERCIAL

## 2023-07-28 DIAGNOSIS — N18.6 ESRD NEEDING DIALYSIS (HCC): ICD-10-CM

## 2023-07-28 DIAGNOSIS — Z99.2 ESRD NEEDING DIALYSIS (HCC): ICD-10-CM

## 2023-07-28 DIAGNOSIS — R06.02 SHORTNESS OF BREATH: ICD-10-CM

## 2023-07-28 PROCEDURE — 71046 X-RAY EXAM CHEST 2 VIEWS: CPT

## 2023-07-31 ENCOUNTER — APPOINTMENT (OUTPATIENT)
Dept: RADIOLOGY | Age: 38
End: 2023-07-31
Payer: COMMERCIAL

## 2023-07-31 DIAGNOSIS — N02.9 IDIOPATHIC HEMATURIA, UNSPECIFIED WHETHER GLOMERULAR MORPHOLOGIC CHANGES PRESENT: ICD-10-CM

## 2023-07-31 DIAGNOSIS — R03.0 ELEVATED BLOOD PRESSURE READING: ICD-10-CM

## 2023-07-31 DIAGNOSIS — T85.611A PERITONEAL DIALYSIS CATHETER DYSFUNCTION, INITIAL ENCOUNTER (HCC): Primary | ICD-10-CM

## 2023-07-31 DIAGNOSIS — R06.02 SHORTNESS OF BREATH: ICD-10-CM

## 2023-07-31 DIAGNOSIS — N02.8 IGA NEPHROPATHY: ICD-10-CM

## 2023-07-31 DIAGNOSIS — R80.9 PROTEINURIA, UNSPECIFIED TYPE: ICD-10-CM

## 2023-07-31 DIAGNOSIS — N18.4 CKD (CHRONIC KIDNEY DISEASE) STAGE 4, GFR 15-29 ML/MIN (HCC): ICD-10-CM

## 2023-07-31 DIAGNOSIS — N18.6 ESRD NEEDING DIALYSIS (HCC): ICD-10-CM

## 2023-07-31 DIAGNOSIS — N07.8 HEREDITARY NEPHRITIS: ICD-10-CM

## 2023-07-31 DIAGNOSIS — N18.5 CKD (CHRONIC KIDNEY DISEASE) STAGE 5, GFR LESS THAN 15 ML/MIN (HCC): ICD-10-CM

## 2023-07-31 DIAGNOSIS — T85.611A PERITONEAL DIALYSIS CATHETER DYSFUNCTION, INITIAL ENCOUNTER (HCC): ICD-10-CM

## 2023-07-31 DIAGNOSIS — Z99.2 ESRD NEEDING DIALYSIS (HCC): ICD-10-CM

## 2023-07-31 PROCEDURE — 74018 RADEX ABDOMEN 1 VIEW: CPT

## 2023-08-14 DIAGNOSIS — N18.6 ESRD ON PERITONEAL DIALYSIS (HCC): ICD-10-CM

## 2023-08-14 DIAGNOSIS — Z99.2 ESRD ON PERITONEAL DIALYSIS (HCC): ICD-10-CM

## 2023-08-14 RX ORDER — GENTAMICIN SULFATE 1 MG/G
CREAM TOPICAL DAILY
Qty: 30 G | Refills: 3 | Status: SHIPPED | OUTPATIENT
Start: 2023-08-14